# Patient Record
Sex: MALE | Race: WHITE | NOT HISPANIC OR LATINO | Employment: OTHER | URBAN - METROPOLITAN AREA
[De-identification: names, ages, dates, MRNs, and addresses within clinical notes are randomized per-mention and may not be internally consistent; named-entity substitution may affect disease eponyms.]

---

## 2017-04-20 ENCOUNTER — APPOINTMENT (OUTPATIENT)
Dept: PREADMISSION TESTING | Facility: HOSPITAL | Age: 67
End: 2017-04-20

## 2017-11-29 ENCOUNTER — OFFICE VISIT (OUTPATIENT)
Dept: ORTHOPEDIC SURGERY | Facility: CLINIC | Age: 67
End: 2017-11-29

## 2017-11-29 VITALS
WEIGHT: 203 LBS | DIASTOLIC BLOOD PRESSURE: 76 MMHG | SYSTOLIC BLOOD PRESSURE: 117 MMHG | HEART RATE: 56 BPM | BODY MASS INDEX: 28.42 KG/M2 | HEIGHT: 71 IN

## 2017-11-29 DIAGNOSIS — M17.11 PRIMARY OSTEOARTHRITIS OF RIGHT KNEE: Primary | ICD-10-CM

## 2017-11-29 PROCEDURE — 99203 OFFICE O/P NEW LOW 30 MIN: CPT | Performed by: ORTHOPAEDIC SURGERY

## 2017-11-29 RX ORDER — PREGABALIN 150 MG/1
150 CAPSULE ORAL ONCE
Status: CANCELLED | OUTPATIENT
Start: 2017-11-29 | End: 2017-11-29

## 2017-11-29 RX ORDER — CHLORHEXIDINE GLUCONATE 4 G/100ML
SOLUTION TOPICAL DAILY
Qty: 236 ML | Refills: 0 | Status: SHIPPED | OUTPATIENT
Start: 2017-11-29 | End: 2017-12-13 | Stop reason: HOSPADM

## 2017-11-29 RX ORDER — MELOXICAM 7.5 MG/1
15 TABLET ORAL ONCE
Status: CANCELLED | OUTPATIENT
Start: 2017-11-29 | End: 2017-11-29

## 2017-11-29 RX ORDER — ACETAMINOPHEN 325 MG/1
1000 TABLET ORAL ONCE
Status: CANCELLED | OUTPATIENT
Start: 2017-11-29 | End: 2017-11-29

## 2017-11-29 NOTE — PROGRESS NOTES
[facility] [place] [facility] [place]    Subjective     Chief Complaint   Patient presents with   • Follow-up     9 month - Right knee osteoarthritis        HPI    Yang Haji is a 66 y.o. male. He presents today for evaluation of right knee pain.  The pain is been present for several years, worsening over the past several months, moderate to severe, aching in quality, as well as sharp and stabbing, associated with swelling, popping, grinding, stiffness and giving way.  It worsens with walking and climbing stairs.  He has been considering knee replacement surgery, and presented today with that in mind.  He was previously followed by Dr. Johnathan Archer.  Previous history of knee surgery in the 1970s, with a postoperative infection.  That seemed to clear with treatment postoperatively, and he returned to fairly normal activities after that.  That surgery appears to be a then a ligamentous reconstruction of the knee, and the hardware was removed in conjunction with possible infection.    The patient has been considering right knee total joint replacement surgery.  The pain has been severe, causing instability, and has been worsening in spite of medications, physical therapy, and joint injections (steroid). The pain interferes with walking, sleeping, work and leisure activities.  No previous history of blood clots, nor family history of clotting disorders.      There is no problem list on file for this patient.    Past Medical History:   Diagnosis Date   • Adult BMI 28.0-28.9 kg/sq m    • Chronic pain of right knee    • Primary osteoarthritis of right knee       Past Surgical History:   Procedure Laterality Date   • KNEE SURGERY      1974      Family History   Problem Relation Age of Onset   • Diabetes Father    • Heart disease Father    • Hypertension Father    • Osteoarthritis Father    • Stroke Father    • Heart attack Father    • Cancer Mother      Social History     Social History   • Marital status:       Spouse name: N/A   • Number of children: N/A   • Years of education: N/A     Occupational History   • Not on file.     Social History Main Topics   • Smoking status: Never Smoker   • Smokeless tobacco: Current User     Types: Chew      Comment: Less than 1/2 can of smoeless tobacco per week   • Alcohol use No   • Drug use: No   • Sexual activity: Defer     Other Topics Concern   • Not on file     Social History Narrative      Current Outpatient Prescriptions on File Prior to Visit   Medication Sig Dispense Refill   • aspirin 81 MG EC tablet Take  by mouth Take As Directed.     • azithromycin (ZITHROMAX) 250 MG tablet Take  by mouth Take As Directed. .     • gabapentin (NEURONTIN) 100 MG capsule Take  by mouth Take As Directed.     • tiZANidine (ZANAFLEX) 2 MG tablet Take  by mouth Take As Directed.       No current facility-administered medications on file prior to visit.       No Known Allergies     Review of Systems   Constitutional: Negative for activity change, appetite change, chills, diaphoresis, fatigue, fever and unexpected weight change.   HENT: Negative for congestion, dental problem, drooling, ear discharge, ear pain, facial swelling, hearing loss, mouth sores, nosebleeds, postnasal drip, rhinorrhea, sinus pressure, sneezing, sore throat, tinnitus, trouble swallowing and voice change.    Eyes: Negative for photophobia, pain, discharge, redness, itching and visual disturbance.   Respiratory: Negative for apnea, cough, choking, chest tightness, shortness of breath, wheezing and stridor.    Cardiovascular: Negative for chest pain, palpitations and leg swelling.   Gastrointestinal: Negative for abdominal distention, abdominal pain, anal bleeding, blood in stool, constipation, diarrhea, nausea, rectal pain and vomiting.   Endocrine: Negative for cold intolerance, heat intolerance, polydipsia, polyphagia and polyuria.   Genitourinary: Negative for decreased urine volume, difficulty urinating, dysuria, enuresis,  "flank pain, frequency, genital sores, hematuria and urgency.   Musculoskeletal: Positive for arthralgias, back pain, gait problem, joint swelling, myalgias, neck pain and neck stiffness.   Skin: Negative for color change, pallor, rash and wound.   Allergic/Immunologic: Negative for environmental allergies, food allergies and immunocompromised state.   Neurological: Negative for dizziness, tremors, seizures, syncope, facial asymmetry, speech difficulty, weakness, light-headedness, numbness and headaches.   Hematological: Negative for adenopathy. Does not bruise/bleed easily.   Psychiatric/Behavioral: Negative for agitation, behavioral problems, confusion, decreased concentration, dysphoric mood, hallucinations, self-injury, sleep disturbance and suicidal ideas. The patient is not nervous/anxious and is not hyperactive.         Objective      Physical Exam  /76  Pulse 56  Ht 71\" (180.3 cm)  Wt 203 lb (92.1 kg)  BMI 28.31 kg/m2    Body mass index is 28.31 kg/(m^2).    General:   Mental Status:  Alert   Appearance: Cooperative, in no acute distress   Build and Nutrition: Well-nourished and well developed male   Orientation: Alert and oriented to person, place and time   Posture: Normal   Gait: Mildly antalgic gait on the right    Integument:   Right knee: Healed medial wound recurs anteromedially as it progresses distally with no signs of infection    Neurologic:   Sensation:    Right foot: Intact to light touch on the dorsal and plantar aspect   Motor:  Right lower extremity: 5/5 quadriceps, hamstrings, ankle dorsiflexors, and ankle plantar flexors    Vascular:   Right lower extremity:  prompt capillary refill    Lower Extremities:   Right Knee:    Tenderness:  Medial joint line tenderness    Effusion:  None    Swelling:  None    Crepitus:  Positive    Atrophy:  None    Range of motion:  Extension: 5°       Flexion: 110°  Instability:  No varus laxity, no valgus laxity, negative anterior drawer  Deformities: "  Varus      Imaging/Studies      Imaging Results (last 24 hours)     Procedure Component Value Units Date/Time    XR Knee 4+ View Right [[id number]] Resulted:  11/29/17 1611     Updated:  11/29/17 1612    Narrative:       RIght Knee Radiographs  Indication: right knee pain  Views: Standing AP's and skiers of both knees, with lateral and sunrise   views of the right knee    Comparison: no prior studies available    Findings:   Advanced arthritic changes are seen, with bone-on-bone contact in the   medial compartment, with tricompartmental osteophytes, and varus   alignment.          Assessment and Plan     Yang was seen today for follow-up.    Diagnoses and all orders for this visit:    Primary osteoarthritis of right knee  -     XR Knee 4+ View Right  -     CBC & Differential; Future  -     C-reactive Protein; Future  -     Sedimentation Rate; Future  -     Case Request; Standing  -     CBC and Differential; Future  -     Comprehensive metabolic panel; Future  -     Protime-INR; Future  -     APTT; Future  -     Hemoglobin A1c; Future  -     Sedimentation rate; Future  -     C-reactive protein; Future  -     Type and screen; Future  -     Urinalysis With / Culture If Indicated - Urine, Clean Catch; Future  -     ECG 12 Lead; Future  -     ceFAZolin (ANCEF) 2 g in sodium chloride 0.9 % 100 mL IVPB; Infuse 2 g into a venous catheter 1 (One) Time.  -     acetaminophen (TYLENOL) tablet 975 mg; Take 3 tablets by mouth 1 (One) Time.  -     meloxicam (MOBIC) tablet 15 mg; Take 2 tablets by mouth 1 (One) Time.  -     pregabalin (LYRICA) capsule 150 mg; Take 1 capsule by mouth 1 (One) Time.  -     mupirocin (BACTROBAN) 2 % nasal ointment 1 application; 1 application by Each Nare route 1 (One) Time.  -     Tranexamic Acid 1,000 mg in sodium chloride 0.9 % 100 mL; Infuse 1,000 mg into a venous catheter 1 (One) Time.  -     Tranexamic Acid 1,000 mg in sodium chloride 0.9 % 100 mL; Infuse 1,000 mg into a venous catheter 1 (One)  Time.  -     Case Request    Other orders  -     Inpatient Admission; Standing  -     Follow Anesthesia Guidelines / Standing Orders; Future  -     Orthopedic Discharge Planning for PT & Case Management - Inpatient With Post-Op Day 2 or 3 Discharge  -     Obtain informed consent  -     Provide instructions to patient regarding NPO status  -     Clorhexidine skin prep  -     Follow Anesthesia Guidelines / Standing Orders; Standing  -     Nerve Block; Standing  -     Verify NPO Status; Standing  -     LASHANDA hose- To be placed on patient in pre-op; Standing  -     SCD (sequential compression device)- to be placed on patient in Pre-op; Standing  -     POC Glucose Fingerstick; Standing  -     Clip operative site; Standing  -     Obtain informed consent (if not collected inpatient or PAT); Standing  -     Notify Physician - Standard; Standing  -     mupirocin (BACTROBAN NASAL) 2 % nasal ointment; into each nostril 2 (Two) Times a Day. Apply pea-sized amount tot each nostril twice daily for 5 days prior to surgery  -     chlorhexidine (HIBICLENS) 4 % external liquid; Apply  topically Daily. Shower with hibiclens solution as directed for 5 days prior to surgery        I reviewed my findings with patient today.  He has advanced right knee arthritis, and has been considering knee replacement surgery.  He has reached the point where he would like to proceed in the near future.  Please see my counseling note for details.  He has exhausted conservative treatment options.  Because of the previous infection years ago, I have recommended screening blood work preoperatively.  Should something show up abnormal, we may consider further imaging.  He has not had any pain in this knee for several years.  The possibility of quiecsent infection has been discussed, but I think that's low likelihood.  Most likely, we will use antibiotic impregnated cement, and certainly if there is anything unusual intraoperatively, that may alter the surgical  course.  He is at an increased risk of infection postoperatively, and this was openly discussed and he understands.    Return for For surgery as planned.     Surgical Counseling     I have informed the patient of the diagnosis and the prognosis.  Exhaustive conservative treatment modalities have not resulted in long term pain relief.  The symptoms have progressed to the point of daily pain and inability to perform activities of daily living without significant pain. The patient has reached the point of desiring to proceed with total knee arthroplasty after discussing the risks, benefits and alternatives to the procedure.  The surgical procedure itself was discussed in detail. Risks of the procedure were discussed, which included but are not limited to, bleeding, infection, damage to blood vessels and nerves, incomplete pain relief, loosening of the prosthesis, deep infection, need for further surgery, loss of limb, deep venous thrombosis, pulmonary embolus, death, heart attack, stroke, kidney failure, liver failure, and anesthetic complications.  In addition, the potential for deep infection developing in the future was discussed, which could require further surgery.  The knee would have to be re-opened, debrided, and potentially remove the prosthesis, which may or may not be replaced in the future.  Also, the possibility for loosening of the prosthesis has been mentioned.  If the prosthesis loosened, a revision arthroplasty could be performed, with results that are not as predictable compared to the original procedure.  The typical rehabilitative course has also been discussed, and full recovery may take up to a year to see the maximum benefit.  The importance of patient cooperation in the rehabilitative efforts has also been discussed.  No guarantees whatsoever were given.  The patient understands the potential risks versus the benefits and desires to proceed with total knee arthroplasty at a mutually convenient  time.      Medical Decision Making  Management Options : major surgery with risk factors  Data/Risk: radiology tests and independent visualization of imaging, lab tests, or EMG/NCV      Nazario Leal MD  11/29/17  6:03 PM

## 2017-11-30 PROBLEM — M17.11 PRIMARY OSTEOARTHRITIS OF RIGHT KNEE: Status: ACTIVE | Noted: 2017-11-30

## 2017-12-05 ENCOUNTER — APPOINTMENT (OUTPATIENT)
Dept: PREADMISSION TESTING | Facility: HOSPITAL | Age: 67
End: 2017-12-05

## 2017-12-05 VITALS — HEIGHT: 73 IN | BODY MASS INDEX: 27.17 KG/M2 | WEIGHT: 205.03 LBS

## 2017-12-05 DIAGNOSIS — M17.11 PRIMARY OSTEOARTHRITIS OF RIGHT KNEE: ICD-10-CM

## 2017-12-05 DIAGNOSIS — Z01.89 LABORATORY TEST: Primary | ICD-10-CM

## 2017-12-05 LAB
ABO GROUP BLD: NORMAL
ALBUMIN SERPL-MCNC: 4.3 G/DL (ref 3.2–4.8)
ALBUMIN/GLOB SERPL: 1.5 G/DL (ref 1.5–2.5)
ALP SERPL-CCNC: 78 U/L (ref 25–100)
ALT SERPL W P-5'-P-CCNC: 23 U/L (ref 7–40)
ANION GAP SERPL CALCULATED.3IONS-SCNC: 4 MMOL/L (ref 3–11)
APTT PPP: 26.6 SECONDS (ref 24–31)
AST SERPL-CCNC: 33 U/L (ref 0–33)
BASOPHILS # BLD AUTO: 0.02 10*3/MM3 (ref 0–0.2)
BASOPHILS NFR BLD AUTO: 0.4 % (ref 0–1)
BILIRUB SERPL-MCNC: 0.7 MG/DL (ref 0.3–1.2)
BILIRUB UR QL STRIP: NEGATIVE
BUN BLD-MCNC: 15 MG/DL (ref 9–23)
BUN/CREAT SERPL: 13.6 (ref 7–25)
CALCIUM SPEC-SCNC: 9.5 MG/DL (ref 8.7–10.4)
CHLORIDE SERPL-SCNC: 106 MMOL/L (ref 99–109)
CLARITY UR: CLEAR
CO2 SERPL-SCNC: 31 MMOL/L (ref 20–31)
COLOR UR: YELLOW
CREAT BLD-MCNC: 1.1 MG/DL (ref 0.6–1.3)
CRP SERPL-MCNC: 0.06 MG/DL (ref 0–1)
DEPRECATED RDW RBC AUTO: 43.7 FL (ref 37–54)
EOSINOPHIL # BLD AUTO: 0.22 10*3/MM3 (ref 0–0.3)
EOSINOPHIL NFR BLD AUTO: 4.9 % (ref 0–3)
ERYTHROCYTE [DISTWIDTH] IN BLOOD BY AUTOMATED COUNT: 12.8 % (ref 11.3–14.5)
ERYTHROCYTE [SEDIMENTATION RATE] IN BLOOD: 13 MM/HR (ref 0–20)
GFR SERPL CREATININE-BSD FRML MDRD: 67 ML/MIN/1.73
GLOBULIN UR ELPH-MCNC: 2.8 GM/DL
GLUCOSE BLD-MCNC: 98 MG/DL (ref 70–100)
GLUCOSE UR STRIP-MCNC: NEGATIVE MG/DL
HBA1C MFR BLD: 6 % (ref 4.8–5.6)
HCT VFR BLD AUTO: 43.2 % (ref 38.9–50.9)
HGB BLD-MCNC: 14.2 G/DL (ref 13.1–17.5)
HGB UR QL STRIP.AUTO: NEGATIVE
IMM GRANULOCYTES # BLD: 0 10*3/MM3 (ref 0–0.03)
IMM GRANULOCYTES NFR BLD: 0 % (ref 0–0.6)
INR PPP: 1.01
KETONES UR QL STRIP: NEGATIVE
LEUKOCYTE ESTERASE UR QL STRIP.AUTO: NEGATIVE
LYMPHOCYTES # BLD AUTO: 1.26 10*3/MM3 (ref 0.6–4.8)
LYMPHOCYTES NFR BLD AUTO: 28 % (ref 24–44)
MCH RBC QN AUTO: 30.8 PG (ref 27–31)
MCHC RBC AUTO-ENTMCNC: 32.9 G/DL (ref 32–36)
MCV RBC AUTO: 93.7 FL (ref 80–99)
MONOCYTES # BLD AUTO: 0.34 10*3/MM3 (ref 0–1)
MONOCYTES NFR BLD AUTO: 7.6 % (ref 0–12)
NEUTROPHILS # BLD AUTO: 2.66 10*3/MM3 (ref 1.5–8.3)
NEUTROPHILS NFR BLD AUTO: 59.1 % (ref 41–71)
NITRITE UR QL STRIP: NEGATIVE
PH UR STRIP.AUTO: 5.5 [PH] (ref 5–8)
PLATELET # BLD AUTO: 282 10*3/MM3 (ref 150–450)
PMV BLD AUTO: 10 FL (ref 6–12)
POTASSIUM BLD-SCNC: 4.6 MMOL/L (ref 3.5–5.5)
PROT SERPL-MCNC: 7.1 G/DL (ref 5.7–8.2)
PROT UR QL STRIP: NEGATIVE
PROTHROMBIN TIME: 11 SECONDS (ref 9.6–11.5)
RBC # BLD AUTO: 4.61 10*6/MM3 (ref 4.2–5.76)
RH BLD: POSITIVE
SODIUM BLD-SCNC: 141 MMOL/L (ref 132–146)
SP GR UR STRIP: 1.02 (ref 1–1.03)
UROBILINOGEN UR QL STRIP: NORMAL
WBC NRBC COR # BLD: 4.5 10*3/MM3 (ref 3.5–10.8)

## 2017-12-05 PROCEDURE — 85730 THROMBOPLASTIN TIME PARTIAL: CPT | Performed by: ORTHOPAEDIC SURGERY

## 2017-12-05 PROCEDURE — 85652 RBC SED RATE AUTOMATED: CPT | Performed by: ORTHOPAEDIC SURGERY

## 2017-12-05 PROCEDURE — 86140 C-REACTIVE PROTEIN: CPT | Performed by: ORTHOPAEDIC SURGERY

## 2017-12-05 PROCEDURE — 83036 HEMOGLOBIN GLYCOSYLATED A1C: CPT | Performed by: ORTHOPAEDIC SURGERY

## 2017-12-05 PROCEDURE — 81003 URINALYSIS AUTO W/O SCOPE: CPT | Performed by: ORTHOPAEDIC SURGERY

## 2017-12-05 PROCEDURE — 80053 COMPREHEN METABOLIC PANEL: CPT | Performed by: ORTHOPAEDIC SURGERY

## 2017-12-05 PROCEDURE — 93005 ELECTROCARDIOGRAM TRACING: CPT

## 2017-12-05 PROCEDURE — 86900 BLOOD TYPING SEROLOGIC ABO: CPT

## 2017-12-05 PROCEDURE — 93010 ELECTROCARDIOGRAM REPORT: CPT | Performed by: INTERNAL MEDICINE

## 2017-12-05 PROCEDURE — 85610 PROTHROMBIN TIME: CPT | Performed by: ORTHOPAEDIC SURGERY

## 2017-12-05 PROCEDURE — 36415 COLL VENOUS BLD VENIPUNCTURE: CPT

## 2017-12-05 PROCEDURE — 86901 BLOOD TYPING SEROLOGIC RH(D): CPT

## 2017-12-05 PROCEDURE — 85025 COMPLETE CBC W/AUTO DIFF WBC: CPT | Performed by: ORTHOPAEDIC SURGERY

## 2017-12-05 RX ORDER — NAPROXEN SODIUM 220 MG
440 TABLET ORAL 2 TIMES DAILY PRN
Status: ON HOLD | COMMUNITY
End: 2017-12-13

## 2017-12-05 RX ORDER — CHOLECALCIFEROL (VITAMIN D3) 125 MCG
10 CAPSULE ORAL NIGHTLY
COMMUNITY
End: 2018-07-31

## 2017-12-05 ASSESSMENT — KOOS JR
KOOS JR SCORE: 19
KOOS JR SCORE: 39.625

## 2017-12-05 NOTE — PAT
Patient to apply Chlorhexadine wipes  to surgical area (as instructed) the night before procedure and the AM of procedure. Wipes provided.    MEASURED FOR TEDS/SCDS IN PAT    CALF MEASUREMENT 14.5in     LENGTH MEASUREMENT 16.5in    Pt and wife attended joint class today

## 2017-12-06 NOTE — PAT
Notified Sharmaine/Elvis of possible MRSA history she will let dr. Leal know to see if he wants vanc.

## 2017-12-11 ENCOUNTER — TELEPHONE (OUTPATIENT)
Dept: ORTHOPEDIC SURGERY | Facility: CLINIC | Age: 67
End: 2017-12-11

## 2017-12-11 NOTE — TELEPHONE ENCOUNTER
----- Message from Nazario Leal MD sent at 12/11/2017  9:30 AM EST -----  He should try decongestant, and we more than likely will be able to proceed.  However, if he is concerned, we can reschedule.    ----- Message -----     From: Sharmaine Fallon     Sent: 12/11/2017   8:30 AM       To: Nazario Leal MD    Mr. Haji is schd for surgery in the morning. He has developed some clear drainage with a slight cough but no fever. He is going to try and contact his PCP. He is wanting to leave today since he is coming from [place] but not sure of what to do.    Please advise. Txs

## 2017-12-11 NOTE — TELEPHONE ENCOUNTER
Patient picked up meds from pharmacy and states he will follow the directions, but now has some congestion w/cough but no fever. Sent message to Dr Leal, waiting on his reponse.

## 2017-12-11 NOTE — TELEPHONE ENCOUNTER
----- Message from Nazario Leal MD sent at 12/8/2017 12:27 PM EST -----  I just looked in the computer and he has the decolonization rx's already.    ----- Message -----     From: Sharmaine Fallon     Sent: 12/8/2017   9:26 AM       To: Nazario Leal MD    Patient told PAT he had a staff infection after his last surgery.  ----- Message -----     From: Nazario Leal MD     Sent: 12/7/2017   6:04 PM       To: Sharmaine Fallon    Why do they think he should get Vancomycin pre-op?    ----- Message -----     From: Sharmaine Fallon     Sent: 12/7/2017   2:32 PM       To: Nazario Leal MD    CHART SHOWS NO ALLERGIES  ----- Message -----     From: Nazario Leal MD     Sent: 12/6/2017   2:25 PM       To: Sharmaine Fallon    Does he have an allergy?    ----- Message -----     From: Sharmaine Fallon     Sent: 12/6/2017   2:18 PM       To: Nazario Leal MD    LEELEE FROM [facility] CALLED TO ADVISE YOU DID NOT ORDER VANCOMYCIN FOR THIS PATIENT. DO YOU WANT IT ADDED?

## 2017-12-12 ENCOUNTER — APPOINTMENT (OUTPATIENT)
Dept: GENERAL RADIOLOGY | Facility: HOSPITAL | Age: 67
End: 2017-12-12

## 2017-12-12 ENCOUNTER — ANESTHESIA EVENT (OUTPATIENT)
Dept: PERIOP | Facility: HOSPITAL | Age: 67
End: 2017-12-12

## 2017-12-12 ENCOUNTER — HOSPITAL ENCOUNTER (INPATIENT)
Facility: HOSPITAL | Age: 67
LOS: 1 days | Discharge: HOME | End: 2017-12-13
Attending: ORTHOPAEDIC SURGERY | Admitting: ORTHOPAEDIC SURGERY

## 2017-12-12 ENCOUNTER — ANESTHESIA (OUTPATIENT)
Dept: PERIOP | Facility: HOSPITAL | Age: 67
End: 2017-12-12

## 2017-12-12 DIAGNOSIS — Z78.9 IMPAIRED MOBILITY AND ADLS: ICD-10-CM

## 2017-12-12 DIAGNOSIS — M17.11 PRIMARY OSTEOARTHRITIS OF RIGHT KNEE: ICD-10-CM

## 2017-12-12 DIAGNOSIS — Z74.09 IMPAIRED MOBILITY AND ADLS: ICD-10-CM

## 2017-12-12 DIAGNOSIS — Z96.651 S/P TOTAL KNEE ARTHROPLASTY, RIGHT: ICD-10-CM

## 2017-12-12 DIAGNOSIS — Z74.09 IMPAIRED FUNCTIONAL MOBILITY, BALANCE, GAIT, AND ENDURANCE: Primary | ICD-10-CM

## 2017-12-12 PROBLEM — Z72.0 TOBACCO ABUSE: Status: ACTIVE | Noted: 2017-12-12

## 2017-12-12 PROBLEM — R73.03 PREDIABETES: Status: ACTIVE | Noted: 2017-12-12

## 2017-12-12 LAB
ABO GROUP BLD: NORMAL
BLD GP AB SCN SERPL QL: NEGATIVE
GLUCOSE BLDC GLUCOMTR-MCNC: 130 MG/DL (ref 70–130)
GLUCOSE BLDC GLUCOMTR-MCNC: 198 MG/DL (ref 70–130)
GLUCOSE BLDC GLUCOMTR-MCNC: 84 MG/DL (ref 70–130)
RH BLD: POSITIVE

## 2017-12-12 PROCEDURE — C1713 ANCHOR/SCREW BN/BN,TIS/BN: HCPCS | Performed by: ORTHOPAEDIC SURGERY

## 2017-12-12 PROCEDURE — 86900 BLOOD TYPING SEROLOGIC ABO: CPT | Performed by: ORTHOPAEDIC SURGERY

## 2017-12-12 PROCEDURE — 25010000002 PROPOFOL 1000 MG/ML EMULSION: Performed by: NURSE ANESTHETIST, CERTIFIED REGISTERED

## 2017-12-12 PROCEDURE — 25010000003 CEFAZOLIN IN DEXTROSE 2-4 GM/100ML-% SOLUTION: Performed by: ORTHOPAEDIC SURGERY

## 2017-12-12 PROCEDURE — 63710000001 DIPHENHYDRAMINE PER 50 MG: Performed by: ORTHOPAEDIC SURGERY

## 2017-12-12 PROCEDURE — 86850 RBC ANTIBODY SCREEN: CPT | Performed by: ORTHOPAEDIC SURGERY

## 2017-12-12 PROCEDURE — 82962 GLUCOSE BLOOD TEST: CPT

## 2017-12-12 PROCEDURE — 27447 TOTAL KNEE ARTHROPLASTY: CPT | Performed by: ORTHOPAEDIC SURGERY

## 2017-12-12 PROCEDURE — C1755 CATHETER, INTRASPINAL: HCPCS | Performed by: ORTHOPAEDIC SURGERY

## 2017-12-12 PROCEDURE — 25010000002 DEXAMETHASONE PER 1 MG: Performed by: NURSE ANESTHETIST, CERTIFIED REGISTERED

## 2017-12-12 PROCEDURE — 97116 GAIT TRAINING THERAPY: CPT

## 2017-12-12 PROCEDURE — C2617 STENT, NON-COR, TEM W/O DEL: HCPCS | Performed by: ORTHOPAEDIC SURGERY

## 2017-12-12 PROCEDURE — 25010000002 ROPIVACAINE PER 1 MG: Performed by: ORTHOPAEDIC SURGERY

## 2017-12-12 PROCEDURE — 0SRC0J9 REPLACEMENT OF RIGHT KNEE JOINT WITH SYNTHETIC SUBSTITUTE, CEMENTED, OPEN APPROACH: ICD-10-PCS | Performed by: ORTHOPAEDIC SURGERY

## 2017-12-12 PROCEDURE — 86901 BLOOD TYPING SEROLOGIC RH(D): CPT | Performed by: ORTHOPAEDIC SURGERY

## 2017-12-12 PROCEDURE — 25010000002 HYDROMORPHONE PER 4 MG: Performed by: ORTHOPAEDIC SURGERY

## 2017-12-12 PROCEDURE — 25010000002 VANCOMYCIN PER 500 MG: Performed by: ORTHOPAEDIC SURGERY

## 2017-12-12 PROCEDURE — C1776 JOINT DEVICE (IMPLANTABLE): HCPCS | Performed by: ORTHOPAEDIC SURGERY

## 2017-12-12 PROCEDURE — 25010000002 ONDANSETRON PER 1 MG: Performed by: NURSE ANESTHETIST, CERTIFIED REGISTERED

## 2017-12-12 PROCEDURE — 97162 PT EVAL MOD COMPLEX 30 MIN: CPT

## 2017-12-12 PROCEDURE — 63710000001 INSULIN LISPRO (HUMAN) PER 5 UNITS: Performed by: NURSE PRACTITIONER

## 2017-12-12 PROCEDURE — 73560 X-RAY EXAM OF KNEE 1 OR 2: CPT

## 2017-12-12 PROCEDURE — 25010000002 ROPIVACAINE PER 1 MG: Performed by: NURSE ANESTHETIST, CERTIFIED REGISTERED

## 2017-12-12 PROCEDURE — 25010000003 MORPHINE PER 10 MG: Performed by: ORTHOPAEDIC SURGERY

## 2017-12-12 DEVICE — ATTUNE KNEE SYSTEM TIBIAL INSERT ROTATING PLATFORM POSTERIOR STABILIZED 8 6MM AOX
Type: IMPLANTABLE DEVICE | Site: KNEE | Status: FUNCTIONAL
Brand: ATTUNE

## 2017-12-12 DEVICE — CMT BONE SIMPLEX/P TMYCIN FDOS SGL: Type: IMPLANTABLE DEVICE | Site: KNEE | Status: FUNCTIONAL

## 2017-12-12 DEVICE — ATTUNE KNEE SYSTEM TIBIAL BASE ROTATING PLATFORM SIZE 8 CEMENTED
Type: IMPLANTABLE DEVICE | Site: KNEE | Status: FUNCTIONAL
Brand: ATTUNE

## 2017-12-12 DEVICE — ATTUNE KNEE SYSTEM FEMORAL POSTERIOR STABILIZED SIZE 8 RIGHT CEMENTED
Type: IMPLANTABLE DEVICE | Site: KNEE | Status: FUNCTIONAL
Brand: ATTUNE

## 2017-12-12 DEVICE — ATTUNE PATELLA MEDIALIZED ANATOMIC 41MM CEMENTED AOX
Type: IMPLANTABLE DEVICE | Site: KNEE | Status: FUNCTIONAL
Brand: ATTUNE

## 2017-12-12 DEVICE — TOTL KN ATTUNE DEPUY 9527038: Type: IMPLANTABLE DEVICE | Site: KNEE | Status: FUNCTIONAL

## 2017-12-12 RX ORDER — CEFAZOLIN SODIUM 2 G/100ML
2 INJECTION, SOLUTION INTRAVENOUS ONCE
Status: COMPLETED | OUTPATIENT
Start: 2017-12-12 | End: 2017-12-12

## 2017-12-12 RX ORDER — PROMETHAZINE HYDROCHLORIDE 25 MG/ML
6.25 INJECTION, SOLUTION INTRAMUSCULAR; INTRAVENOUS ONCE AS NEEDED
Status: DISCONTINUED | OUTPATIENT
Start: 2017-12-12 | End: 2017-12-12 | Stop reason: HOSPADM

## 2017-12-12 RX ORDER — MELOXICAM 7.5 MG/1
15 TABLET ORAL DAILY
Status: DISCONTINUED | OUTPATIENT
Start: 2017-12-12 | End: 2017-12-13 | Stop reason: HOSPADM

## 2017-12-12 RX ORDER — DEXAMETHASONE SODIUM PHOSPHATE 4 MG/ML
INJECTION, SOLUTION INTRA-ARTICULAR; INTRALESIONAL; INTRAMUSCULAR; INTRAVENOUS; SOFT TISSUE AS NEEDED
Status: DISCONTINUED | OUTPATIENT
Start: 2017-12-12 | End: 2017-12-12 | Stop reason: SURG

## 2017-12-12 RX ORDER — ACETAMINOPHEN 325 MG/1
650 TABLET ORAL EVERY 4 HOURS PRN
Status: DISCONTINUED | OUTPATIENT
Start: 2017-12-12 | End: 2017-12-13 | Stop reason: HOSPADM

## 2017-12-12 RX ORDER — SODIUM CHLORIDE 9 MG/ML
120 INJECTION, SOLUTION INTRAVENOUS CONTINUOUS
Status: DISCONTINUED | OUTPATIENT
Start: 2017-12-12 | End: 2017-12-13 | Stop reason: HOSPADM

## 2017-12-12 RX ORDER — ONDANSETRON 2 MG/ML
4 INJECTION INTRAMUSCULAR; INTRAVENOUS EVERY 6 HOURS PRN
Status: DISCONTINUED | OUTPATIENT
Start: 2017-12-12 | End: 2017-12-13 | Stop reason: HOSPADM

## 2017-12-12 RX ORDER — BUPIVACAINE HYDROCHLORIDE 2.5 MG/ML
INJECTION, SOLUTION EPIDURAL; INFILTRATION; INTRACAUDAL AS NEEDED
Status: DISCONTINUED | OUTPATIENT
Start: 2017-12-12 | End: 2017-12-12 | Stop reason: SURG

## 2017-12-12 RX ORDER — ASPIRIN 325 MG
325 TABLET, DELAYED RELEASE (ENTERIC COATED) ORAL DAILY
Status: DISCONTINUED | OUTPATIENT
Start: 2017-12-13 | End: 2017-12-13 | Stop reason: HOSPADM

## 2017-12-12 RX ORDER — PROMETHAZINE HYDROCHLORIDE 25 MG/1
25 TABLET ORAL ONCE AS NEEDED
Status: DISCONTINUED | OUTPATIENT
Start: 2017-12-12 | End: 2017-12-12 | Stop reason: HOSPADM

## 2017-12-12 RX ORDER — MORPHINE SULFATE 2 MG/ML
4 INJECTION, SOLUTION INTRAMUSCULAR; INTRAVENOUS
Status: DISCONTINUED | OUTPATIENT
Start: 2017-12-12 | End: 2017-12-13 | Stop reason: HOSPADM

## 2017-12-12 RX ORDER — FENTANYL CITRATE 50 UG/ML
50 INJECTION, SOLUTION INTRAMUSCULAR; INTRAVENOUS
Status: DISCONTINUED | OUTPATIENT
Start: 2017-12-12 | End: 2017-12-12 | Stop reason: HOSPADM

## 2017-12-12 RX ORDER — LIDOCAINE HYDROCHLORIDE 10 MG/ML
0.5 INJECTION, SOLUTION EPIDURAL; INFILTRATION; INTRACAUDAL; PERINEURAL ONCE AS NEEDED
Status: COMPLETED | OUTPATIENT
Start: 2017-12-12 | End: 2017-12-12

## 2017-12-12 RX ORDER — BISACODYL 10 MG
10 SUPPOSITORY, RECTAL RECTAL DAILY PRN
Status: DISCONTINUED | OUTPATIENT
Start: 2017-12-12 | End: 2017-12-13 | Stop reason: HOSPADM

## 2017-12-12 RX ORDER — BUPIVACAINE HYDROCHLORIDE 5 MG/ML
INJECTION, SOLUTION EPIDURAL; INTRACAUDAL AS NEEDED
Status: DISCONTINUED | OUTPATIENT
Start: 2017-12-12 | End: 2017-12-12 | Stop reason: SURG

## 2017-12-12 RX ORDER — PREGABALIN 75 MG/1
150 CAPSULE ORAL ONCE
Status: COMPLETED | OUTPATIENT
Start: 2017-12-12 | End: 2017-12-12

## 2017-12-12 RX ORDER — SENNA AND DOCUSATE SODIUM 50; 8.6 MG/1; MG/1
2 TABLET, FILM COATED ORAL 2 TIMES DAILY
Status: DISCONTINUED | OUTPATIENT
Start: 2017-12-12 | End: 2017-12-13 | Stop reason: HOSPADM

## 2017-12-12 RX ORDER — FAMOTIDINE 20 MG/1
20 TABLET, FILM COATED ORAL EVERY 12 HOURS SCHEDULED
Status: DISCONTINUED | OUTPATIENT
Start: 2017-12-12 | End: 2017-12-12 | Stop reason: HOSPADM

## 2017-12-12 RX ORDER — HYDRALAZINE HYDROCHLORIDE 20 MG/ML
10 INJECTION INTRAMUSCULAR; INTRAVENOUS EVERY 6 HOURS PRN
Status: DISCONTINUED | OUTPATIENT
Start: 2017-12-12 | End: 2017-12-13 | Stop reason: HOSPADM

## 2017-12-12 RX ORDER — HYDROMORPHONE HYDROCHLORIDE 1 MG/ML
0.5 INJECTION, SOLUTION INTRAMUSCULAR; INTRAVENOUS; SUBCUTANEOUS
Status: DISCONTINUED | OUTPATIENT
Start: 2017-12-12 | End: 2017-12-13 | Stop reason: HOSPADM

## 2017-12-12 RX ORDER — ACETAMINOPHEN 500 MG
1000 TABLET ORAL ONCE
Status: COMPLETED | OUTPATIENT
Start: 2017-12-12 | End: 2017-12-12

## 2017-12-12 RX ORDER — ONDANSETRON 2 MG/ML
INJECTION INTRAMUSCULAR; INTRAVENOUS AS NEEDED
Status: DISCONTINUED | OUTPATIENT
Start: 2017-12-12 | End: 2017-12-12 | Stop reason: SURG

## 2017-12-12 RX ORDER — MAGNESIUM HYDROXIDE 1200 MG/15ML
LIQUID ORAL AS NEEDED
Status: DISCONTINUED | OUTPATIENT
Start: 2017-12-12 | End: 2017-12-12 | Stop reason: HOSPADM

## 2017-12-12 RX ORDER — DOCUSATE SODIUM 100 MG/1
100 CAPSULE, LIQUID FILLED ORAL 2 TIMES DAILY PRN
Status: DISCONTINUED | OUTPATIENT
Start: 2017-12-12 | End: 2017-12-13 | Stop reason: HOSPADM

## 2017-12-12 RX ORDER — DEXTROSE MONOHYDRATE 25 G/50ML
25 INJECTION, SOLUTION INTRAVENOUS
Status: DISCONTINUED | OUTPATIENT
Start: 2017-12-12 | End: 2017-12-13 | Stop reason: HOSPADM

## 2017-12-12 RX ORDER — ONDANSETRON 2 MG/ML
4 INJECTION INTRAMUSCULAR; INTRAVENOUS EVERY 6 HOURS PRN
Status: DISCONTINUED | OUTPATIENT
Start: 2017-12-12 | End: 2017-12-12 | Stop reason: SDUPTHER

## 2017-12-12 RX ORDER — NICOTINE POLACRILEX 4 MG
15 LOZENGE BUCCAL
Status: DISCONTINUED | OUTPATIENT
Start: 2017-12-12 | End: 2017-12-13 | Stop reason: HOSPADM

## 2017-12-12 RX ORDER — FAMOTIDINE 10 MG/ML
20 INJECTION, SOLUTION INTRAVENOUS EVERY 12 HOURS SCHEDULED
Status: DISCONTINUED | OUTPATIENT
Start: 2017-12-12 | End: 2017-12-12 | Stop reason: HOSPADM

## 2017-12-12 RX ORDER — SODIUM CHLORIDE 0.9 % (FLUSH) 0.9 %
1-10 SYRINGE (ML) INJECTION AS NEEDED
Status: DISCONTINUED | OUTPATIENT
Start: 2017-12-12 | End: 2017-12-12 | Stop reason: HOSPADM

## 2017-12-12 RX ORDER — BISACODYL 5 MG/1
10 TABLET, DELAYED RELEASE ORAL DAILY PRN
Status: DISCONTINUED | OUTPATIENT
Start: 2017-12-12 | End: 2017-12-13 | Stop reason: HOSPADM

## 2017-12-12 RX ORDER — SODIUM CHLORIDE 0.9 % (FLUSH) 0.9 %
1-10 SYRINGE (ML) INJECTION AS NEEDED
Status: DISCONTINUED | OUTPATIENT
Start: 2017-12-12 | End: 2017-12-13 | Stop reason: HOSPADM

## 2017-12-12 RX ORDER — ROPIVACAINE HYDROCHLORIDE 2 MG/ML
14 INJECTION, SOLUTION EPIDURAL; INFILTRATION CONTINUOUS
Status: DISCONTINUED | OUTPATIENT
Start: 2017-12-12 | End: 2017-12-13

## 2017-12-12 RX ORDER — NALOXONE HCL 0.4 MG/ML
0.4 VIAL (ML) INJECTION
Status: DISCONTINUED | OUTPATIENT
Start: 2017-12-12 | End: 2017-12-13 | Stop reason: HOSPADM

## 2017-12-12 RX ORDER — NICOTINE 21 MG/24HR
1 PATCH, TRANSDERMAL 24 HOURS TRANSDERMAL
Status: DISCONTINUED | OUTPATIENT
Start: 2017-12-12 | End: 2017-12-13 | Stop reason: HOSPADM

## 2017-12-12 RX ORDER — ONDANSETRON 4 MG/1
4 TABLET, FILM COATED ORAL EVERY 6 HOURS PRN
Status: DISCONTINUED | OUTPATIENT
Start: 2017-12-12 | End: 2017-12-13 | Stop reason: HOSPADM

## 2017-12-12 RX ORDER — MELOXICAM 7.5 MG/1
15 TABLET ORAL ONCE
Status: COMPLETED | OUTPATIENT
Start: 2017-12-12 | End: 2017-12-12

## 2017-12-12 RX ORDER — PROMETHAZINE HYDROCHLORIDE 25 MG/1
25 SUPPOSITORY RECTAL ONCE AS NEEDED
Status: DISCONTINUED | OUTPATIENT
Start: 2017-12-12 | End: 2017-12-12 | Stop reason: HOSPADM

## 2017-12-12 RX ORDER — CEFAZOLIN SODIUM 2 G/100ML
2 INJECTION, SOLUTION INTRAVENOUS EVERY 8 HOURS
Status: COMPLETED | OUTPATIENT
Start: 2017-12-12 | End: 2017-12-13

## 2017-12-12 RX ORDER — DIPHENHYDRAMINE HCL 25 MG
25 CAPSULE ORAL EVERY 6 HOURS PRN
Status: DISCONTINUED | OUTPATIENT
Start: 2017-12-12 | End: 2017-12-13 | Stop reason: HOSPADM

## 2017-12-12 RX ORDER — SODIUM CHLORIDE, SODIUM LACTATE, POTASSIUM CHLORIDE, CALCIUM CHLORIDE 600; 310; 30; 20 MG/100ML; MG/100ML; MG/100ML; MG/100ML
9 INJECTION, SOLUTION INTRAVENOUS CONTINUOUS PRN
Status: DISCONTINUED | OUTPATIENT
Start: 2017-12-12 | End: 2017-12-12 | Stop reason: HOSPADM

## 2017-12-12 RX ORDER — NALOXONE HCL 0.4 MG/ML
0.1 VIAL (ML) INJECTION
Status: DISCONTINUED | OUTPATIENT
Start: 2017-12-12 | End: 2017-12-13 | Stop reason: HOSPADM

## 2017-12-12 RX ORDER — EPHEDRINE SULFATE 50 MG/ML
5 INJECTION, SOLUTION INTRAVENOUS ONCE AS NEEDED
Status: DISCONTINUED | OUTPATIENT
Start: 2017-12-12 | End: 2017-12-12 | Stop reason: HOSPADM

## 2017-12-12 RX ORDER — HYDROCODONE BITARTRATE AND ACETAMINOPHEN 7.5; 325 MG/1; MG/1
1 TABLET ORAL EVERY 4 HOURS PRN
Status: DISCONTINUED | OUTPATIENT
Start: 2017-12-12 | End: 2017-12-13

## 2017-12-12 RX ADMIN — TRANEXAMIC ACID 1000 MG: 100 INJECTION, SOLUTION INTRAVENOUS at 12:50

## 2017-12-12 RX ADMIN — MUPIROCIN 1 APPLICATION: 20 OINTMENT TOPICAL at 10:15

## 2017-12-12 RX ADMIN — DIPHENHYDRAMINE HYDROCHLORIDE 25 MG: 25 CAPSULE ORAL at 21:24

## 2017-12-12 RX ADMIN — PROPOFOL 45 MCG/KG/MIN: 10 INJECTION, EMULSION INTRAVENOUS at 12:50

## 2017-12-12 RX ADMIN — SODIUM CHLORIDE, POTASSIUM CHLORIDE, SODIUM LACTATE AND CALCIUM CHLORIDE 9 ML/HR: 600; 310; 30; 20 INJECTION, SOLUTION INTRAVENOUS at 09:59

## 2017-12-12 RX ADMIN — ROPIVACAINE HYDROCHLORIDE 12 ML/HR: 2 INJECTION, SOLUTION EPIDURAL; INFILTRATION at 15:15

## 2017-12-12 RX ADMIN — PREGABALIN 150 MG: 75 CAPSULE ORAL at 10:15

## 2017-12-12 RX ADMIN — HYDROCODONE BITARTRATE AND ACETAMINOPHEN 1 TABLET: 7.5; 325 TABLET ORAL at 21:11

## 2017-12-12 RX ADMIN — MELOXICAM 15 MG: 7.5 TABLET ORAL at 10:15

## 2017-12-12 RX ADMIN — INSULIN LISPRO 2 UNITS: 100 INJECTION, SOLUTION INTRAVENOUS; SUBCUTANEOUS at 21:11

## 2017-12-12 RX ADMIN — LIDOCAINE HYDROCHLORIDE 0.2 ML: 10 INJECTION, SOLUTION EPIDURAL; INFILTRATION; INTRACAUDAL; PERINEURAL at 09:59

## 2017-12-12 RX ADMIN — BUPIVACAINE HYDROCHLORIDE 2 ML: 5 INJECTION, SOLUTION EPIDURAL; INTRACAUDAL; PERINEURAL at 12:42

## 2017-12-12 RX ADMIN — TRANEXAMIC ACID 1000 MG: 100 INJECTION, SOLUTION INTRAVENOUS at 13:45

## 2017-12-12 RX ADMIN — CEFAZOLIN SODIUM 2 G: 2 INJECTION, SOLUTION INTRAVENOUS at 12:37

## 2017-12-12 RX ADMIN — CEFAZOLIN SODIUM 2 G: 2 INJECTION, SOLUTION INTRAVENOUS at 21:11

## 2017-12-12 RX ADMIN — DEXAMETHASONE SODIUM PHOSPHATE 8 MG: 4 INJECTION, SOLUTION INTRAMUSCULAR; INTRAVENOUS at 12:50

## 2017-12-12 RX ADMIN — FAMOTIDINE 20 MG: 20 TABLET, FILM COATED ORAL at 10:15

## 2017-12-12 RX ADMIN — HYDROMORPHONE HYDROCHLORIDE 0.5 MG: 1 INJECTION, SOLUTION INTRAMUSCULAR; INTRAVENOUS; SUBCUTANEOUS at 22:42

## 2017-12-12 RX ADMIN — MELOXICAM 15 MG: 7.5 TABLET ORAL at 17:32

## 2017-12-12 RX ADMIN — ACETAMINOPHEN 1000 MG: 500 TABLET ORAL at 10:15

## 2017-12-12 RX ADMIN — ONDANSETRON 4 MG: 2 INJECTION INTRAMUSCULAR; INTRAVENOUS at 14:12

## 2017-12-12 RX ADMIN — Medication 2 TABLET: at 17:32

## 2017-12-12 RX ADMIN — BUPIVACAINE HYDROCHLORIDE 20 ML: 2.5 INJECTION, SOLUTION EPIDURAL; INFILTRATION; INTRACAUDAL; PERINEURAL at 14:58

## 2017-12-12 NOTE — OP NOTE
DATE OF PROCEDURE: 12/12/17     SURGEON: Nazario Leal MD    ASSISTANT:  Francesca Chowdhury PA-C    PREOPERATIVE DIAGNOSIS: Right knee arthritis      POSTOPERATIVE DIAGNOSIS:  Right knee arthritis    PROCEDURES PERFORMED:   Right total knee arthroplasty with DePuy Attune components (# 8 posterior stabilized femur, # 8 rotating platform tibia, 6 mm rotating platform polyethylene, with 41 mm three peg anatomic patella).     SPECIMENS: None    IMPLANTS:   Implants     Implant    Cmt Bone Simplex/P Tmycin Fdos Sgl - [id number] - Implanted     Inventory item: Cmt Bone Simplex/P Tmycin Fdos Sgl Model/Cat number: [id number]    Serial number:  : ALLISON [id number]    Lot number: [id number] Size:     Device identifier:  Device identifier type:     As of 12/12/2017     Status: Implanted                  Cmt Bone Simplex/P Tmycin Fdos Sgl - [id number] - Implanted     Inventory item: Cmt Bone Simplex/P Tmycin Fdos Sgl Model/Cat number: [id number]    Serial number:  : ALLISON [id number]    Lot number: [id number] Size:     Device identifier:  Device identifier type:     As of 12/12/2017     Status: Implanted                  Base Tib Attune Cmt Rp Sz8 - [id number] - Implanted     Inventory item: Base Tib Attune Cmt Rp Sz8 Model/Cat number: [id number]    Serial number:  : [id number]    Lot number: [id number] Size:     Device identifier:  Device identifier type:     As of 12/12/2017     Status: Implanted                  Comp Fem Attune Cmt Ps Sz8 Rt - [id number] - Implanted     Inventory item: Comp Fem Attune Cmt Ps Sz8 Rt Model/Cat number: [id number]    Serial number:  : [id number]    Lot number: [id number] Size:     Device identifier:  Device identifier type:     As of 12/12/2017     Status: Implanted                  Comp Pat Attune Cmt Medl Gaye 41mm - [id number] - Implanted     Inventory item: Comp Pat Attune Cmt Medl Gaye 41mm Model/Cat number: [id number]    Serial number:  : [id number]    Lot number: [id number] Size:      Device identifier:  Device identifier type:     As of 12/12/2017     Status: Implanted                  Insrt Boyd Manuel Rp Ps Sz8 6mm - [id number] - Implanted     Inventory item: Insrt Boyd Manuel Rp Ps Sz8 6mm Model/Cat number: [id number]    Serial number:  : [id number]    Lot number: [id number] Size:     Device identifier:  Device identifier type:     As of 12/12/2017     Status: Implanted                         ANESTHESIA:  Spinal    STAFF:  Circulator: Cayetano Kim RN  Scrub Person: Jewel Billy  Vendor Representative: Tank Matute  Nursing Assistant: Fredy Benton  Assistant: Francesca Chowdhury PA-C    TOURNIQUET TIME: 73 minutes     ESTIMATED BLOOD LOSS: minimal     COMPLICATIONS: None    PREOPERATIVE ANTIBIOTICS: Ancef 2G    INDICATIONS: The patient is a 67 year old male with debilitating right knee pain secondary to osteoarthritis that failed to improve in spite of conservative treatment .  Options have been discussed at length with the patient and the patient has had an extended course of conservative treatment without long-term benefit. The patient has reached the point where the patient desires total knee arthroplasty surgery and understands the risks, benefits, and alternatives. Consent was obtained. Please see my office notes for details with regard to preoperative counseling and operative rationale.     DESCRIPTION OF PROCEDURE: The patient was positively identified in the preoperative holding area and brought to the operating suite and placed in a supine position. After adequate spinal anesthetic had been achieved, the right lower extremity was prepped and draped in the usual sterile fashion.  After application of a tourniquet to the right upper thigh, which was used during the procedure for a total 73 minutes. Landmarks of the knee were identified and timeout procedure was performed to confirm the operative site, as well as other parameters. Following the sterile prep and drape, a skin  incision was made just off the medial aspect of midline for a medial parapatellar approach. Following a sharp skin incision, dissection was carried down to the level of the extensor mechanism and a medial parapatellar arthrotomy was made and the patella was tucked into the lateral gutter. Description of arthritis:  Severe tricompartmental arthritis, with bone-on-bone contact in the medial and patellofemoral compartments, with tricompartmental osteophytes.  The knee was adequately exposed and a distal femoral cut was made with an intramedullary guide. This was subsequently sized for a # 8 implant and anterior, posterior chamfer cuts made. The menisci removed both medially and laterally.  The ACL was transected and the tibia was subluxed anteriorly. Proximal tibia cut was made with the external alignment guide. The cut was noted to be perpendicular to the tibial axis, with symmetric flexion and extension gaps. Therefore, final femoral preparations were made with the box cutting guide followed by final tibial preparations to accommodate a # 8 tibia. With a trial 6 insert in place, full flexion and extension was noted with no instability. The patella was then prepared for a 41 mm three peg anatomic patella which had excellent tracking. All trial components were removed and final components were cemented in place with namely a # 8 rotating platform tibia, # 8 posterior stabilized femur and a 41 mm three peg anatomic patella with a trial 6 insert for cement compression. All the excess cement was removed from the bone implant interface and allowed to harden. Tourniquet was deflated. Hemostasis was obtained with electrocautery. There was no brisk bleeding noted in the popliteal fossa in particular. Therefore, the knee was copiously irrigated as it was between major steps, and the final 6 mm insert was placed as this was deemed appropriate for the patient's anatomy with full flexion and extension and no instability and  attention was then directed towards closure. The medial parapatellar arthrotomy was closed with #1 Vicryl in an interrupted figure-of-eight fashion in 4 strategic locations followed by oversewing this from proximal to distal with a #1 StrataFix symmetric, which nicely sealed the joint, followed by closure of the deep fascial layer with #1 Vicryl in a buried interrupted fashion, followed by closure of the subcutaneous layer with 2-0 Vicryl and the skin with 3-0 Stratafix in a running subcuticular fashion. Prineo wound closure dressing was applied followed by a sterile dressing with 4 x 4's, abdominal pad, soft roll and Ace wrap. The patient tolerated the procedure well and was brought to the recovery room in good condition.     PLAN:  1.  The patient will begin early range of motion and weight-bearing per the post total knee arthroplasty protocol.   2.  I anticipate brief hospitalization for initial rehabilitation and pain control followed by continued rehabilitation in either home health or supervised setting.   3.  Postoperative medical management with Dr. Salazar.  4.  Aspirin will be utilized for DVT prophylaxis unless there is a contraindication.       Nazario Leal MD  12/12/17

## 2017-12-12 NOTE — ANESTHESIA POSTPROCEDURE EVALUATION
Patient: Yang Haji    Procedure Summary     Date Anesthesia Start Anesthesia Stop Room / Location    12/12/17 1237 1511 [facility] / [facility]       Procedure Diagnosis Surgeon Provider    RIGHT TOTAL KNEE ARTHROPLASTY (Right Knee) Primary osteoarthritis of right knee  (Primary osteoarthritis of right knee [M17.11]) MD Cruz West MD          Anesthesia Type: spinal, regional  Last vitals  BP   113/66 (12/12/17 1505)   Temp   98 °F (36.7 °C) (12/12/17 1457)   Pulse   53 (12/12/17 1505)   Resp   16 (12/12/17 1505)     SpO2   99 % (12/12/17 1505)     Post Anesthesia Care and Evaluation    Patient location during evaluation: PACU  Patient participation: complete - patient participated  Level of consciousness: awake and alert  Pain score: 0  Pain management: adequate  Airway patency: patent  Anesthetic complications: No anesthetic complications  PONV Status: none  Cardiovascular status: hemodynamically stable and acceptable  Respiratory status: nonlabored ventilation, acceptable and nasal cannula  Hydration status: acceptable

## 2017-12-12 NOTE — H&P
Patient Name: Yang Haji  MRN: [id number]  : 1950  DOS: 2017    Attending: Nazario Leal MD    Primary Care Provider: Jamarcus Harmon MD      Chief complaint:  Right knee osteoarthritis    Subjective   Patient is a 67 y.o. male presented for right total knee arthroplasty  by Dr. Leal under spinal anesthesia. He tolerated surgery well and is admitted for further medical management. His knee has been painful for many years. He denies use of assistive device for ambulation. Conservative treatments failed to provide pain relief.    When seen in PACU he is doing well. He denies pain, but is still under effects of spinal anesthesia. He denies nausea, shortness of breath or chest pain. No hx of DVT or PE.    (Above is  noted, agree.  Seen in his room afterwards.  Doing well.  Good pain control.  Spinal anesthesia effects have essentially subsided and he is ambulated down the hallway with physical therapy.  No nausea or vomiting or shortness breath.       Allergies:  No Known Allergies    Meds:  Prescriptions Prior to Admission   Medication Sig Dispense Refill Last Dose   • chlorhexidine (HIBICLENS) 4 % external liquid Apply  topically Daily. Shower with hibiclens solution as directed for 5 days prior to surgery 236 mL 0 2017 at 2200   • melatonin 5 MG tablet tablet Take 10 mg by mouth Every Night.   2017 at 2200   • mupirocin (BACTROBAN NASAL) 2 % nasal ointment into each nostril 2 (Two) Times a Day. Apply pea-sized amount tot each nostril twice daily for 5 days prior to surgery 22 g 0 2017 at 2200   • naproxen sodium (ALEVE) 220 MG tablet Take 440 mg by mouth 2 (Two) Times a Day As Needed for Mild Pain .   Past Month at Unknown time   • aspirin 81 MG EC tablet Take 81 mg by mouth Daily.   2017       History:   Past Medical History:   Diagnosis Date   • Adult BMI 28.0-28.9 kg/sq m    • Back pain    • Chronic pain of right knee    • PONV (postoperative nausea and vomiting)    •  Primary osteoarthritis of right knee    • Staph infection 1974    right knee, treated with antibiotics    • Wears glasses      Past Surgical History:   Procedure Laterality Date   • COLONOSCOPY  10/2017   • KNEE SURGERY Right 1974    debridement d/t staph infection   • LASIK Bilateral    • MEDIAL COLLATERAL LIGAMENT REPAIR, KNEE Right 1974   • RHIZOTOMY       Family History   Problem Relation Age of Onset   • Diabetes Father    • Heart disease Father    • Hypertension Father    • Osteoarthritis Father    • Stroke Father    • Heart attack Father    • Cancer Mother      Social History   Substance Use Topics   • Smoking status: Never Smoker   • Smokeless tobacco: Current User     Types: Chew      Comment: Less than 1/2 can of smokeless tobacco per week   • Alcohol use 1.2 oz/week     2 Glasses of wine per week      Comment: occasional   He is  and has 2 children. He is retired from education.    Review of Systems  Pertinent items are noted in HPI, all other systems reviewed and negative    Vital Signs  /67  Pulse 54  Temp 97.5 °F (36.4 °C) (Tympanic)   Resp 16  SpO2 98%    Physical Exam:    General Appearance:    Alert, cooperative, in no acute distress   Head:    Normocephalic, without obvious abnormality, atraumatic   Eyes:            Lids and lashes normal, conjunctivae and sclerae normal, no   icterus, no pallor, corneas clear, PERRLA   Ears:    Ears appear intact with no abnormalities noted   Neck:   No adenopathy, supple, trachea midline, no thyromegaly, no     carotid bruit, no JVD   Lungs:     Clear to auscultation,respirations regular, even and                   unlabored    Heart:    Regular rhythm and normal rate, normal S1 and S2, no            murmur, no gallop   Abdomen:     Normal bowel sounds, no masses, no organomegaly, soft        non-tender, non-distended, no guarding, no rebound                 tenderness   Genitalia:    Deferred   Extremities:   Right knee ACE wrap CDI. Nerve block  present   Pulses:   Pulses palpable and equal bilaterally   Skin:   No bleeding, bruising or rash   Neurologic:   Cranial nerves 2 - 12 grossly intact, lack of sensation or movement BLE due to effects of spinal block      I reviewed the patient's new clinical results.     Results for RINA FRAAG (MRN [id number]) as of 12/12/2017 15:24   Ref. Range 12/5/2017 13:01   Glucose Latest Ref Range: 70 - 100 mg/dL 98   Sodium Latest Ref Range: 132 - 146 mmol/L 141   Potassium Latest Ref Range: 3.5 - 5.5 mmol/L 4.6   CO2 Latest Ref Range: 20.0 - 31.0 mmol/L 31.0   Chloride Latest Ref Range: 99 - 109 mmol/L 106   Anion Gap Latest Ref Range: 3.0 - 11.0 mmol/L 4.0   Creatinine Latest Ref Range: 0.60 - 1.30 mg/dL 1.10   BUN Latest Ref Range: 9 - 23 mg/dL 15   BUN/Creatinine Ratio Latest Ref Range: 7.0 - 25.0  13.6   Calcium Latest Ref Range: 8.7 - 10.4 mg/dL 9.5   eGFR Non African Amer Latest Ref Range: >60 mL/min/1.73 67   Alkaline Phosphatase Latest Ref Range: 25 - 100 U/L 78   Total Protein Latest Ref Range: 5.7 - 8.2 g/dL 7.1   ALT (SGPT) Latest Ref Range: 7 - 40 U/L 23   AST (SGOT) Latest Ref Range: 0 - 33 U/L 33   Total Bilirubin Latest Ref Range: 0.3 - 1.2 mg/dL 0.7   Albumin Latest Ref Range: 3.20 - 4.80 g/dL 4.30   Globulin Latest Units: gm/dL 2.8   A/G Ratio Latest Ref Range: 1.5 - 2.5 g/dL 1.5   Hemoglobin A1C Latest Ref Range: 4.80 - 5.60 % 6.00 (H)   C-Reactive Protein Latest Ref Range: 0.00 - 1.00 mg/dL 0.06   Protime Latest Ref Range: 9.6 - 11.5 Seconds 11.0   INR Unknown 1.01   aPTT Latest Ref Range: 24.0 - 31.0 seconds 26.6   WBC Latest Ref Range: 3.50 - 10.80 10*3/mm3 4.50   RBC Latest Ref Range: 4.20 - 5.76 10*6/mm3 4.61   Hemoglobin Latest Ref Range: 13.1 - 17.5 g/dL 14.2   Hematocrit Latest Ref Range: 38.9 - 50.9 % 43.2   RDW Latest Ref Range: 11.3 - 14.5 % 12.8   MCV Latest Ref Range: 80.0 - 99.0 fL 93.7   MCH Latest Ref Range: 27.0 - 31.0 pg 30.8   MCHC Latest Ref Range: 32.0 - 36.0 g/dL 32.9   MPV  Latest Ref Range: 6.0 - 12.0 fL 10.0   Platelets Latest Ref Range: 150 - 450 10*3/mm3 282     Assessment and Plan:   Principal Problem:    S/P total knee arthroplasty, right  Active Problems:    Primary osteoarthritis of right knee    Tobacco abuse    Prediabetes      Plan  1. PT/OT- early ambulation post op  2. Pain control-prns, AC nerve block  3. IS-encourage  4. DVT proph- mechs/ASA  5. Bowel regimen  6. Resume home medications as appropriate  7. Monitor post-op labs  8. DC planning for home    Tobacco abuse  - Nicotine patch    PreDM  - hgb A1c on 12/5/17 6.0  - Accuchecks ACHS with low dose SSI  - DM educator consult    Seen and examined by me. Agree with above. Discussed with patient and wife.lesly Salazar MD  12/12/17  6:25 PM

## 2017-12-12 NOTE — INTERVAL H&P NOTE
H&P reviewed. The patient was examined and there are no changes to the H&P.     Heart: RRR with no MRG  Lungs: CTAB    Immunizations:    Tetanus: Unknown     Influenza: No     Pneumo: No    Labs were reviewed.     EKG: NSR    Plan: Right TKA      Agree with above - plan for right TKA

## 2017-12-12 NOTE — THERAPY EVALUATION
Acute Care - Physical Therapy Initial Evaluation  [facility]     Patient Name: Yang Haji  : 1950  MRN: [id number]  Today's Date: 2017   Onset of Illness/Injury or Date of Surgery Date: 17  Date of Referral to PT: 17  Referring Physician: MD Elvis      Admit Date: 2017     Visit Dx:    ICD-10-CM ICD-9-CM   1. Impaired functional mobility, balance, gait, and endurance Z74.09 V49.89   2. Primary osteoarthritis of right knee M17.11 715.16     Patient Active Problem List   Diagnosis   • Primary osteoarthritis of right knee   • S/P total knee arthroplasty, right   • Tobacco abuse   • Prediabetes     Past Medical History:   Diagnosis Date   • Adult BMI 28.0-28.9 kg/sq m    • Back pain    • Chronic pain of right knee    • PONV (postoperative nausea and vomiting)    • Primary osteoarthritis of right knee    • Staph infection     right knee, treated with antibiotics    • Wears glasses      Past Surgical History:   Procedure Laterality Date   • COLONOSCOPY  10/2017   • KNEE SURGERY Right 1974    debridement d/t staph infection   • LASIK Bilateral    • MEDIAL COLLATERAL LIGAMENT REPAIR, KNEE Right    • RHIZOTOMY            PT ASSESSMENT (last 72 hours)      PT Evaluation       17 1735 17 1004    Rehab Evaluation    Document Type evaluation  -LR     Subjective Information agree to therapy;complains of;numbness;pain   reports some numbness still remaining in LEs from spinal.   -LR     Patient Effort, Rehab Treatment good  -LR     Symptoms Noted During/After Treatment fatigue;increased pain  -LR     Symptoms Noted Comment Notified RN.   -LR     General Information    Patient Profile Review yes  -LR     Onset of Illness/Injury or Date of Surgery Date 17  -LR     Referring Physician MD Elvis  -LR     General Observations Patient supine in bed upon arrival. IV, SCDs, R knee ace bandaged, R adductor canal nerve catheter.   -LR     Pertinent History Of Current Problem  Patient presents for surgical management of persistent and progressive R knee pain and dysfunction that has failed to improve with conservative management.   -LR     Precautions/Limitations fall precautions;other (see comments)   R adductor canal nerve catheter, some numbness/knee buckling  -LR     Prior Level of Function min assist:;all household mobility;community mobility;gait;transfer;bed mobility;home management;cooking;cleaning;shopping;using stairs;independent:;driving   all mobility limited by pain.   -LR     Equipment Currently Used at Home cane, quad;cane, straight;commode;raised toilet   pt unsure if he has a shower chair versus tub bench  -LR none  -NM    Plans/Goals Discussed With patient;spouse/S.O.;agreed upon  -LR     Risks Reviewed patient:;spouse/S.O.:;LOB;nausea/vomiting;dizziness;increased discomfort;lines disloged  -LR     Benefits Reviewed patient:;spouse/S.O.:;improve function;increase independence;increase strength;increase balance;decrease pain;increase knowledge  -LR     Barriers to Rehab previous functional deficit  -LR     Living Environment    Lives With spouse  -LR spouse  -NM    Living Arrangements house  -LR house  -NM    Home Accessibility bed and bath on same level;house is not wheelchair accessible;stairs to enter home;stairs within home;tub/shower is not walk in;other (see comments)   tub shower and walk in shower  -LR     Number of Stairs to Enter Home 5  -LR     Number of Stairs Within Home --   does not have to access second floor.   -LR     Stair Railings at Home none  -LR     Type of Financial/Environmental Concern none  -LR     Transportation Available family or friend will provide  -LR car;family or friend will provide  -NM    Living Environment Comment Patient can also access home from garage that is at least 10 steps with handrail on R to get to main floor.   -LR     Clinical Impression    Date of Referral to PT 12/12/17  -LR     PT Diagnosis s/p elective R TKA  -LR      Prognosis good  -LR     Patient/Family Goals Statement go home, decrease pain  -LR     Criteria for Skilled Therapeutic Interventions Met yes;treatment indicated  -LR     Rehab Potential good, to achieve stated therapy goals  -LR     Pain Assessment    Pain Assessment 0-10  -LR     Pain Score 1  -LR     Post Pain Score 3  -LR     Pain Type Acute pain  -LR     Pain Location Knee  -LR     Pain Orientation Right;Anterior  -LR     Pain Intervention(s) Repositioned;Ambulation/increased activity;Cold applied  -LR     Vision Assessment/Intervention    Visual Impairment WFL  -LR     Cognitive Assessment/Intervention    Current Cognitive/Communication Assessment functional  -LR     Orientation Status oriented x 4;required verbal cueing (specifiy in comments)  -LR     Follows Commands/Answers Questions 100% of the time;able to follow single-step instructions;needs cueing;needs repetition  -LR     Personal Safety WNL/WFL  -LR     ROM (Range of Motion)    General ROM lower extremity range of motion deficits identified  -LR     General LE Assessment    ROM LLE ROM was WFL;knee, right: LE ROM deficit  -LR     ROM Detail R knee AROM impaired 25%  -LR     MMT (Manual Muscle Testing)    General MMT Assessment lower extremity strength deficits identified  -LR     Lower Extremity    Lower Ext Manual Muscle Testing left LE strength is WFL;right knee strength deficit  -LR     Lower Ext Manual Muscle Testing Detail R knee functionally 2/5 initially, improved with mobility; knee buckling initially, improved as gait progressed.   -LR     Mobility Assessment/Training    Extremity Weight-Bearing Status right lower extremity  -LR     Right Lower Extremity Weight-Bearing weight-bearing as tolerated  -LR     Bed Mobility, Assessment/Treatment    Bed Mobility, Assistive Device head of bed elevated;bed rails  -LR     Bed Mob, Supine to Sit, [place] verbal cues required;minimum assist (75% patient effort)  -LR     Bed Mob, Sit to Supine,  [place] not tested   [facility] at end of eval.   -LR     Bed Mobility, Safety Issues decreased use of legs for bridging/pushing  -LR     Bed Mobility, Impairments ROM decreased;strength decreased;pain;sensation decreased  -LR     Bed Mobility, Comment Verbal cues to move LEs towards EOB and to push up from bed from behind to raise trunk into sitting and to scoot hips out to get feet on floor. Min assist to move R LE. Denied dizziness upon sitting up.   -LR     Transfer Assessment/Treatment    Transfers, Sit-Stand [place] verbal cues required;contact guard assist;2 person assist required  -LR     Transfers, Stand-Sit [place] verbal cues required;contact guard assist;2 person assist required  -LR     Transfers, Sit-Stand-Sit, Assist Device rolling walker  -LR     Transfer, Safety Issues sequencing ability decreased;step length decreased;weight-shifting ability decreased;knees buckling  -LR     Transfer, Impairments ROM decreased;strength decreased;sensation decreased;pain  -LR     Transfer, Comment Verbal cues to push up from bed to stand and to reach back for chair to lower into sitting. Verbal cues to step R LE out before t/f for comfort. Patient initially stood and did not bear weight on R LE and took a few hops on L LE towards chair. Cued patient to weight shift onto R LE to assess strength. Mild knee buckling but able to take steps, compensating with UE weight bearing.   -LR     Gait Assessment/Treatment    Gait, [place] Level verbal cues required;contact guard assist;2 person assist required  -LR     Gait, Assistive Device rolling walker  -LR     Gait, Distance (Feet) 160  -LR     Gait, Gait Pattern Analysis swing-to gait  -LR     Gait, Gait Deviations right:;antalgic;decreased heel strike;forward flexed posture;step length decreased;toe-to-floor clearance decreased;weight-shifting ability decreased;knee buckling  -LR     Gait, Safety Issues sequencing ability decreased;step length  decreased;weight-shifting ability decreased  -LR     Gait, Impairments ROM decreased;strength decreased;sensation decreased;pain  -LR     Gait, Comment Patient ambulated with step to gait pattern at slow pace. Initially with mild R knee buckling with weight bearing, cued to compensate with UE weight bearing. Improved as gait progressed and patient was able to tolerate more R LE weight bearing without knee buckling and therefore required less UE weight bearing. Cues for sequencing of steps and to not step past front of RW. Gait limited by pain and weakness.   -LR     Therapy Exercises    Exercise Protocols total knee  -LR     Total Knee Exercises right:;10 reps;ankle pumps/circles;quad set;glut set   cues for technique; poor quad set  -LR     Sensory Assessment/Intervention    Sensory Impairment --   c/o numbness R LE;able to actively DF bilaterally  -LR     Positioning and Restraints    Pre-Treatment Position in bed  -LR     Post Treatment Position chair  -LR     In Chair notified nsg;reclined;sitting;call light within reach;encouraged to call for assist;with family/caregiver;compression device;legs elevated;RLE elevated  -LR       User Key  (r) = Recorded By, (t) = Taken By, (c) = Cosigned By    Initials Name Provider Type    LR Xochitl Veronica, PT Physical Therapist    NM Maxine Zazueta, RN Registered Nurse          Physical Therapy Education     Title: PT OT SLP Therapies (Done)     Topic: Physical Therapy (Done)     Point: Mobility training (Done)    Learning Progress Summary    Learner Readiness Method Response Comment Documented by Status   Patient Acceptance EJULES NR Educated on weight bearing status, precautions, correct gait mechanics, and proper ice use. LR 12/12/17 1836 Done   Significant Other Acceptance EJULES NR Educated on weight bearing status, precautions, correct gait mechanics, and proper ice use. LR 12/12/17 1836 Done               Point: Home exercise program (Done)    Learning Progress  Summary    Learner Readiness Method Response Comment Documented by Status   Patient Acceptance E,D VU,NR Educated on weight bearing status, precautions, correct gait mechanics, and proper ice use. LR 12/12/17 1836 Done   Significant Other Acceptance E,D VU,NR Educated on weight bearing status, precautions, correct gait mechanics, and proper ice use. LR 12/12/17 1836 Done               Point: Body mechanics (Done)    Learning Progress Summary    Learner Readiness Method Response Comment Documented by Status   Patient Acceptance E,D VU,NR Educated on weight bearing status, precautions, correct gait mechanics, and proper ice use. LR 12/12/17 1836 Done   Significant Other Acceptance E,D VU,NR Educated on weight bearing status, precautions, correct gait mechanics, and proper ice use. LR 12/12/17 1836 Done               Point: Precautions (Done)    Learning Progress Summary    Learner Readiness Method Response Comment Documented by Status   Patient Acceptance E,D VU,NR Educated on weight bearing status, precautions, correct gait mechanics, and proper ice use. LR 12/12/17 1836 Done   Significant Other Acceptance E,D VU,NR Educated on weight bearing status, precautions, correct gait mechanics, and proper ice use. LR 12/12/17 1836 Done                      User Key     Initials Effective Dates Name Provider Type Discipline    LR 06/19/15 -  Xochitl Veronica, PT Physical Therapist PT                PT Recommendation and Plan  Anticipated Equipment Needs At Discharge: front wheeled walker  Anticipated Discharge Disposition: home with assist, home with home health  Planned Therapy Interventions: balance training, bed mobility training, gait training, home exercise program, patient/family education, ROM (Range of Motion), stair training, strengthening, transfer training  PT Frequency: 2 times/day  Plan of Care Review  Plan Of Care Reviewed With: patient, spouse  Progress: progress toward functional goals as  expected  Outcome Summary/Follow up Plan: Patient limited by numbness from spinal remaining in R LE, but able to ambulate 160 feet with RW with mild R knee buckling, which improved as gait progressed. Instructed nursing to use knee immobilizer for further mobility if numbness persists. ROM to be initiated POD#1. Plan is d/c home with HHPT. Will continue to progress as able.           IP PT Goals       12/12/17 1735          Bed Mobility PT LTG    Bed Mobility PT LTG, Date Established 12/12/17  -LR      Bed Mobility PT LTG, Time to Achieve 3 days  -LR      Bed Mobility PT LTG, Activity Type supine to sit/sit to supine  -LR      Bed Mobility PT LTG, [place] Level conditional independence  -LR      Bed Mobility PT LTG, Date Goal Reviewed 12/12/17  -LR      Bed Mobility PT LTG, Outcome goal ongoing  -LR      Transfer Training PT LTG    Transfer Training PT LTG, Date Established 12/12/17  -LR      Transfer Training PT LTG, Time to Achieve 3 days  -LR      Transfer Training PT LTG, Activity Type sit to stand/stand to sit  -LR      Transfer Training PT LTG, [place] Level conditional independence  -LR      Transfer Training PT LTG, Assist Device walker, rolling  -LR      Transfer Training PT  LTG, Date Goal Reviewed 12/12/17  -LR      Transfer Training PT LTG, Outcome goal ongoing  -LR      Gait Training PT LTG    Gait Training Goal PT LTG, Date Established 12/12/17  -LR      Gait Training Goal PT LTG, Time to Achieve 3 days  -LR      Gait Training Goal PT LTG, [place] Level conditional independence  -LR      Gait Training Goal PT LTG, Assist Device walker, rolling  -LR      Gait Training Goal PT LTG, Distance to Achieve 500 feet  -LR      Gait Training Goal PT LTG, Date Goal Reviewed 12/12/17  -LR      Gait Training Goal PT LTG, Outcome goal ongoing  -LR      Stair Training PT LTG    Stair Training Goal PT LTG, Date Established 12/12/17  -LR      Stair Training Goal PT LTG, Time to Achieve 3 days  -LR       Stair Training Goal PT LTG, Number of Steps 5  -LR      Stair Training Goal PT LTG, [place] Level contact guard assist  -LR      Stair Training Goal PT LTG, Assist Device cane, straight;other (see comments)   HHA  -LR      Stair Training Goal PT LTG, Date Goal Reviewed 12/12/17  -LR      Stair Training Goal PT LTG, Outcome goal ongoing  -LR      Range of Motion PT LTG    Range of Motion Goal PT LTG, Date Established 12/12/17  -LR      Range of Motion Goal PT LTG, Time to Achieve 3 days  -LR      Range fo Motion Goal PT LTG, Joint R knee  -LR      Range of Motion Goal PT LTG, AAROM Measure 0-90 degrees  -LR      Range of Motion Goal PT LTG, Date Goal Reviewed 12/12/17  -LR      Range of Motion Goal PT LTG, Outcome goal ongoing  -LR        User Key  (r) = Recorded By, (t) = Taken By, (c) = Cosigned By    Initials Name Provider Type    LR Xochitl Veronica, PT Physical Therapist                Outcome Measures       12/12/17 1735          How much help from another person do you currently need...    Turning from your back to your side while in flat bed without using bedrails? 3  -LR      Moving from lying on back to sitting on the side of a flat bed without bedrails? 3  -LR      Moving to and from a bed to a chair (including a wheelchair)? 3  -LR      Standing up from a chair using your arms (e.g., wheelchair, bedside chair)? 3  -LR      Climbing 3-5 steps with a railing? 1  -LR      To walk in hospital room? 3  -LR      AM-PAC 6 Clicks Score 16  -LR      Functional Assessment    Outcome Measure Options AM-PAC 6 Clicks Basic Mobility (PT)  -LR        User Key  (r) = Recorded By, (t) = Taken By, (c) = Cosigned By    Initials Name Provider Type    DARNELL Veronica, PT Physical Therapist           Time Calculation:         PT Charges       12/12/17 1836          Time Calculation    Start Time 1735  -LR      PT Received On 12/12/17  -LR      PT Goal Re-Cert Due Date 12/22/17  -LR      Time Calculation- PT     Total Timed Code Minutes- PT 15 minute(s)  -LR        User Key  (r) = Recorded By, (t) = Taken By, (c) = Cosigned By    Initials Name Provider Type    LR Xochitl Veronica, PT Physical Therapist          Therapy Charges for Today     Code Description Service Date Service Provider Modifiers Qty    08647102490 HC GAIT TRAINING EA 15 MIN 12/12/2017 Xochitl Veronica, PT GP 1    03115694378 HC PT THER SUPP EA 15 MIN 12/12/2017 Xochitl Veronica, PT GP 4    03335386448 HC PT EVAL MOD COMPLEXITY 3 12/12/2017 Xochitl Veronica, PT GP 1          PT G-Codes  Outcome Measure Options: AM-PAC 6 Clicks Basic Mobility (PT)      Xochitl Veronica, PT  12/12/2017

## 2017-12-12 NOTE — ANESTHESIA PREPROCEDURE EVALUATION
Anesthesia Evaluation     Patient summary reviewed and Nursing notes reviewed   history of anesthetic complications: PONV  NPO Solid Status: > 8 hours  NPO Liquid Status: > 2 hours     Airway   Mallampati: I  TM distance: >3 FB  Neck ROM: full  no difficulty expected  Dental - normal exam     Pulmonary     breath sounds clear to auscultation  Cardiovascular   Exercise tolerance: good (4-7 METS)    ECG reviewed  Rhythm: regular  Rate: abnormal        Neuro/Psych  GI/Hepatic/Renal/Endo      Musculoskeletal     (+) back pain,   Abdominal  - normal exam  (-) obese    Abdomen: soft.   Substance History      OB/GYN          Other   (+) arthritis                                     Anesthesia Plan    ASA 2     spinal and regional     intravenous induction   Anesthetic plan and risks discussed with patient.    Plan discussed with CRNA.

## 2017-12-12 NOTE — ANESTHESIA PROCEDURE NOTES
Spinal Block    Patient location during procedure: OR  Indication:at surgeon's request  Performed By  CRNA: KE SOLIS  Preanesthetic Checklist  Completed: patient identified, site marked, surgical consent, pre-op evaluation, timeout performed, IV checked, risks and benefits discussed and monitors and equipment checked  Spinal Block Prep:  Patient Position:sitting  Sterile Tech:cap, gloves, sterile barriers and mask  Prep:Chloraprep  Patient Monitoring:blood pressure monitoring, continuous pulse oximetry and EKG  Spinal Block Procedure  Approach:midline  Guidance:landmark technique and palpation technique  Location:L4-L5  Needle Type:Hossein  Needle Gauge:25 G  Placement of Spinal needle event:cerebrospinal fluid aspirated  Paresthesia: no  Fluid Appearance:clear  Post Assessment  Patient Tolerance:patient tolerated the procedure well with no apparent complications  Complications no  Additional Notes  Procedure:  Pt assisted to sitting position, with legs in position of comfort over side of bed.  Pt. instructed in optimal spine presentation, the spine was prepped/ Draped and the skin at insertion site was anesthetized with 1% Lidocaine 2 ml.  The spinal needle was then advanced until CSF flow was obtained and LA was injected:      Marcaine 0.5% PSF injected 10 Mg.

## 2017-12-12 NOTE — H&P (VIEW-ONLY)
[facility]    Subjective     Chief Complaint   Patient presents with   • Follow-up     9 month - Right knee osteoarthritis        HPI    Yang Haji is a 66 y.o. male. He presents today for evaluation of right knee pain.  The pain is been present for several years, worsening over the past several months, moderate to severe, aching in quality, as well as sharp and stabbing, associated with swelling, popping, grinding, stiffness and giving way.  It worsens with walking and climbing stairs.  He has been considering knee replacement surgery, and presented today with that in mind.  He was previously followed by Dr. Johnathan Archer.  Previous history of knee surgery in the 1970s, with a postoperative infection.  That seemed to clear with treatment postoperatively, and he returned to fairly normal activities after that.  That surgery appears to be a then a ligamentous reconstruction of the knee, and the hardware was removed in conjunction with possible infection.    The patient has been considering right knee total joint replacement surgery.  The pain has been severe, causing instability, and has been worsening in spite of medications, physical therapy, and joint injections (steroid). The pain interferes with walking, sleeping, work and leisure activities.  No previous history of blood clots, nor family history of clotting disorders.      There is no problem list on file for this patient.    Past Medical History:   Diagnosis Date   • Adult BMI 28.0-28.9 kg/sq m    • Chronic pain of right knee    • Primary osteoarthritis of right knee       Past Surgical History:   Procedure Laterality Date   • KNEE SURGERY      1974      Family History   Problem Relation Age of Onset   • Diabetes Father    • Heart disease Father    • Hypertension Father    • Osteoarthritis Father    • Stroke Father    • Heart attack Father    • Cancer Mother      Social History     Social History   • Marital status:       Spouse name: N/A   • Number of children: N/A   • Years of education: N/A     Occupational History   • Not on file.     Social History Main Topics   • Smoking status: Never Smoker   • Smokeless tobacco: Current User     Types: Chew      Comment: Less than 1/2 can of smoeless tobacco per week   • Alcohol use No   • Drug use: No   • Sexual activity: Defer     Other Topics Concern   • Not on file     Social History Narrative      Current Outpatient Prescriptions on File Prior to Visit   Medication Sig Dispense Refill   • aspirin 81 MG EC tablet Take  by mouth Take As Directed.     • azithromycin (ZITHROMAX) 250 MG tablet Take  by mouth Take As Directed. .     • gabapentin (NEURONTIN) 100 MG capsule Take  by mouth Take As Directed.     • tiZANidine (ZANAFLEX) 2 MG tablet Take  by mouth Take As Directed.       No current facility-administered medications on file prior to visit.       No Known Allergies     Review of Systems   Constitutional: Negative for activity change, appetite change, chills, diaphoresis, fatigue, fever and unexpected weight change.   HENT: Negative for congestion, dental problem, drooling, ear discharge, ear pain, facial swelling, hearing loss, mouth sores, nosebleeds, postnasal drip, rhinorrhea, sinus pressure, sneezing, sore throat, tinnitus, trouble swallowing and voice change.    Eyes: Negative for photophobia, pain, discharge, redness, itching and visual disturbance.   Respiratory: Negative for apnea, cough, choking, chest tightness, shortness of breath, wheezing and stridor.    Cardiovascular: Negative for chest pain, palpitations and leg swelling.   Gastrointestinal: Negative for abdominal distention, abdominal pain, anal bleeding, blood in stool, constipation, diarrhea, nausea, rectal pain and vomiting.   Endocrine: Negative for cold intolerance, heat intolerance, polydipsia, polyphagia and polyuria.   Genitourinary: Negative for decreased urine volume, difficulty urinating, dysuria, enuresis,  "flank pain, frequency, genital sores, hematuria and urgency.   Musculoskeletal: Positive for arthralgias, back pain, gait problem, joint swelling, myalgias, neck pain and neck stiffness.   Skin: Negative for color change, pallor, rash and wound.   Allergic/Immunologic: Negative for environmental allergies, food allergies and immunocompromised state.   Neurological: Negative for dizziness, tremors, seizures, syncope, facial asymmetry, speech difficulty, weakness, light-headedness, numbness and headaches.   Hematological: Negative for adenopathy. Does not bruise/bleed easily.   Psychiatric/Behavioral: Negative for agitation, behavioral problems, confusion, decreased concentration, dysphoric mood, hallucinations, self-injury, sleep disturbance and suicidal ideas. The patient is not nervous/anxious and is not hyperactive.         Objective      Physical Exam  /76  Pulse 56  Ht 71\" (180.3 cm)  Wt 203 lb (92.1 kg)  BMI 28.31 kg/m2    Body mass index is 28.31 kg/(m^2).    General:   Mental Status:  Alert   Appearance: Cooperative, in no acute distress   Build and Nutrition: Well-nourished and well developed male   Orientation: Alert and oriented to person, place and time   Posture: Normal   Gait: Mildly antalgic gait on the right    Integument:   Right knee: Healed medial wound recurs anteromedially as it progresses distally with no signs of infection    Neurologic:   Sensation:    Right foot: Intact to light touch on the dorsal and plantar aspect   Motor:  Right lower extremity: 5/5 quadriceps, hamstrings, ankle dorsiflexors, and ankle plantar flexors    Vascular:   Right lower extremity:  prompt capillary refill    Lower Extremities:   Right Knee:    Tenderness:  Medial joint line tenderness    Effusion:  None    Swelling:  None    Crepitus:  Positive    Atrophy:  None    Range of motion:  Extension: 5°       Flexion: 110°  Instability:  No varus laxity, no valgus laxity, negative anterior drawer  Deformities:  "  Varus      Imaging/Studies      Imaging Results (last 24 hours)     Procedure Component Value Units Date/Time    XR Knee 4+ View Right [[id number]] Resulted:  11/29/17 1611     Updated:  11/29/17 1612    Narrative:       RIght Knee Radiographs  Indication: right knee pain  Views: Standing AP's and skiers of both knees, with lateral and sunrise   views of the right knee    Comparison: no prior studies available    Findings:   Advanced arthritic changes are seen, with bone-on-bone contact in the   medial compartment, with tricompartmental osteophytes, and varus   alignment.          Assessment and Plan     Yang was seen today for follow-up.    Diagnoses and all orders for this visit:    Primary osteoarthritis of right knee  -     XR Knee 4+ View Right  -     CBC & Differential; Future  -     C-reactive Protein; Future  -     Sedimentation Rate; Future  -     Case Request; Standing  -     CBC and Differential; Future  -     Comprehensive metabolic panel; Future  -     Protime-INR; Future  -     APTT; Future  -     Hemoglobin A1c; Future  -     Sedimentation rate; Future  -     C-reactive protein; Future  -     Type and screen; Future  -     Urinalysis With / Culture If Indicated - Urine, Clean Catch; Future  -     ECG 12 Lead; Future  -     ceFAZolin (ANCEF) 2 g in sodium chloride 0.9 % 100 mL IVPB; Infuse 2 g into a venous catheter 1 (One) Time.  -     acetaminophen (TYLENOL) tablet 975 mg; Take 3 tablets by mouth 1 (One) Time.  -     meloxicam (MOBIC) tablet 15 mg; Take 2 tablets by mouth 1 (One) Time.  -     pregabalin (LYRICA) capsule 150 mg; Take 1 capsule by mouth 1 (One) Time.  -     mupirocin (BACTROBAN) 2 % nasal ointment 1 application; 1 application by Each Nare route 1 (One) Time.  -     Tranexamic Acid 1,000 mg in sodium chloride 0.9 % 100 mL; Infuse 1,000 mg into a venous catheter 1 (One) Time.  -     Tranexamic Acid 1,000 mg in sodium chloride 0.9 % 100 mL; Infuse 1,000 mg into a venous catheter 1 (One)  Time.  -     Case Request    Other orders  -     Inpatient Admission; Standing  -     Follow Anesthesia Guidelines / Standing Orders; Future  -     Orthopedic Discharge Planning for PT & Case Management - Inpatient With Post-Op Day 2 or 3 Discharge  -     Obtain informed consent  -     Provide instructions to patient regarding NPO status  -     Clorhexidine skin prep  -     Follow Anesthesia Guidelines / Standing Orders; Standing  -     Nerve Block; Standing  -     Verify NPO Status; Standing  -     LASHANDA hose- To be placed on patient in pre-op; Standing  -     SCD (sequential compression device)- to be placed on patient in Pre-op; Standing  -     POC Glucose Fingerstick; Standing  -     Clip operative site; Standing  -     Obtain informed consent (if not collected inpatient or PAT); Standing  -     Notify Physician - Standard; Standing  -     mupirocin (BACTROBAN NASAL) 2 % nasal ointment; into each nostril 2 (Two) Times a Day. Apply pea-sized amount tot each nostril twice daily for 5 days prior to surgery  -     chlorhexidine (HIBICLENS) 4 % external liquid; Apply  topically Daily. Shower with hibiclens solution as directed for 5 days prior to surgery        I reviewed my findings with patient today.  He has advanced right knee arthritis, and has been considering knee replacement surgery.  He has reached the point where he would like to proceed in the near future.  Please see my counseling note for details.  He has exhausted conservative treatment options.  Because of the previous infection years ago, I have recommended screening blood work preoperatively.  Should something show up abnormal, we may consider further imaging.  He has not had any pain in this knee for several years.  The possibility of quiecsent infection has been discussed, but I think that's low likelihood.  Most likely, we will use antibiotic impregnated cement, and certainly if there is anything unusual intraoperatively, that may alter the surgical  course.  He is at an increased risk of infection postoperatively, and this was openly discussed and he understands.    Return for For surgery as planned.     Surgical Counseling     I have informed the patient of the diagnosis and the prognosis.  Exhaustive conservative treatment modalities have not resulted in long term pain relief.  The symptoms have progressed to the point of daily pain and inability to perform activities of daily living without significant pain. The patient has reached the point of desiring to proceed with total knee arthroplasty after discussing the risks, benefits and alternatives to the procedure.  The surgical procedure itself was discussed in detail. Risks of the procedure were discussed, which included but are not limited to, bleeding, infection, damage to blood vessels and nerves, incomplete pain relief, loosening of the prosthesis, deep infection, need for further surgery, loss of limb, deep venous thrombosis, pulmonary embolus, death, heart attack, stroke, kidney failure, liver failure, and anesthetic complications.  In addition, the potential for deep infection developing in the future was discussed, which could require further surgery.  The knee would have to be re-opened, debrided, and potentially remove the prosthesis, which may or may not be replaced in the future.  Also, the possibility for loosening of the prosthesis has been mentioned.  If the prosthesis loosened, a revision arthroplasty could be performed, with results that are not as predictable compared to the original procedure.  The typical rehabilitative course has also been discussed, and full recovery may take up to a year to see the maximum benefit.  The importance of patient cooperation in the rehabilitative efforts has also been discussed.  No guarantees whatsoever were given.  The patient understands the potential risks versus the benefits and desires to proceed with total knee arthroplasty at a mutually convenient  time.      Medical Decision Making  Management Options : major surgery with risk factors  Data/Risk: radiology tests and independent visualization of imaging, lab tests, or EMG/NCV      Nazario Leal MD  11/29/17  6:03 PM

## 2017-12-12 NOTE — PLAN OF CARE
Problem: Patient Care Overview (Adult)  Goal: Plan of Care Review  Outcome: Ongoing (interventions implemented as appropriate)    12/12/17 1735   Coping/Psychosocial Response Interventions   Plan Of Care Reviewed With patient;spouse   Patient Care Overview   Progress progress toward functional goals as expected   Outcome Evaluation   Outcome Summary/Follow up Plan Patient limited by numbness from spinal remaining in R LE, but able to ambulate 160 feet with RW with mild R knee buckling, which improved as gait progressed. Instructed nursing to use knee immobilizer for further mobility if numbness persists. ROM to be initiated POD#1. Plan is d/c home with HHPT. Will continue to progress as able.          Problem: Inpatient Physical Therapy  Goal: Bed Mobility Goal LTG- PT  Outcome: Ongoing (interventions implemented as appropriate)    12/12/17 1735   Bed Mobility PT LTG   Bed Mobility PT LTG, Date Established 12/12/17   Bed Mobility PT LTG, Time to Achieve 3 days   Bed Mobility PT LTG, Activity Type supine to sit/sit to supine   Bed Mobility PT LTG, [place] Level conditional independence   Bed Mobility PT LTG, Date Goal Reviewed 12/12/17   Bed Mobility PT LTG, Outcome goal ongoing       Goal: Transfer Training Goal 1 LTG- PT  Outcome: Ongoing (interventions implemented as appropriate)    12/12/17 1735   Transfer Training PT LTG   Transfer Training PT LTG, Date Established 12/12/17   Transfer Training PT LTG, Time to Achieve 3 days   Transfer Training PT LTG, Activity Type sit to stand/stand to sit   Transfer Training PT LTG, [place] Level conditional independence   Transfer Training PT LTG, Assist Device walker, rolling   Transfer Training PT LTG, Date Goal Reviewed 12/12/17   Transfer Training PT LTG, Outcome goal ongoing       Goal: Gait Training Goal LTG- PT  Outcome: Ongoing (interventions implemented as appropriate)    12/12/17 1735   Gait Training PT LTG   Gait Training Goal PT LTG, Date Established  12/12/17   Gait Training Goal PT LTG, Time to Achieve 3 days   Gait Training Goal PT LTG, [place] Level conditional independence   Gait Training Goal PT LTG, Assist Device walker, rolling   Gait Training Goal PT LTG, Distance to Achieve 500 feet   Gait Training Goal PT LTG, Date Goal Reviewed 12/12/17   Gait Training Goal PT LTG, Outcome goal ongoing       Goal: Stair Training Goal LTG- PT  Outcome: Ongoing (interventions implemented as appropriate)    12/12/17 1735   Stair Training PT LTG   Stair Training Goal PT LTG, Date Established 12/12/17   Stair Training Goal PT LTG, Time to Achieve 3 days   Stair Training Goal PT LTG, Number of Steps 5   Stair Training Goal PT LTG, [place] Level contact guard assist   Stair Training Goal PT LTG, Assist Device cane, straight;other (see comments)  (HHA)   Stair Training Goal PT LTG, Date Goal Reviewed 12/12/17   Stair Training Goal PT LTG, Outcome goal ongoing       Goal: Range of Motion Goal LTG- PT  Outcome: Ongoing (interventions implemented as appropriate)    12/12/17 1735   Range of Motion PT LTG   Range of Motion Goal PT LTG, Date Established 12/12/17   Range of Motion Goal PT LTG, Time to Achieve 3 days   Range fo Motion Goal PT LTG, Joint R knee   Range of Motion Goal PT LTG, AAROM Measure 0-90 degrees   Range of Motion Goal PT LTG, Date Goal Reviewed 12/12/17   Range of Motion Goal PT LTG, Outcome goal ongoing

## 2017-12-12 NOTE — ANESTHESIA PROCEDURE NOTES
Peripheral Block    Patient location during procedure: post-op  Reason for block: at surgeon's request and post-op pain management  Performed by  Anesthesiologist: MOE GARY  Assisted by: KE SOLIS  Preanesthetic Checklist  Completed: patient identified, site marked, surgical consent, pre-op evaluation, timeout performed, IV checked, risks and benefits discussed and monitors and equipment checked  Prep:  Pt Position: supine  Sterile barriers:cap, gloves, mask and sterile barriers  Prep: ChloraPrep  Patient monitoring: blood pressure monitoring, continuous pulse oximetry and EKG  Procedure  Sedation:no  Performed under: spinal  Guidance:ultrasound guided  Images:still images obtained    Laterality:right  Block Type:adductor canal block  Injection Technique:catheter  Needle Type:Tuohy and echogenic  Needle Gauge:18 G  Resistance on Injection: none  Catheter Size:20 G (20g)  Cath Depth at skin: 10 cm  Medications  Preservative Free Saline:8ml  Local Injected:bupivacaine 0.25% Local Amount Injected:20 (ml)mL  Post Assessment  Injection Assessment: negative aspiration for heme, incremental injection and no paresthesia on injection  Patient Tolerance:comfortable throughout block  Complications:no  Additional Notes  Procedure:             The pt was placed in the Supine position.  The Insertion site was  prepped and Draped in sterile fashion.  The pt was anesthetized with  IV Sedation( see meds).  Skin and cutaneous tissue was infiltrated and anesthetized with 1% Lidocaine 3 mls via a 25g needle.  A BBraun 4 inch 18g echogenic needle was then  inserted approximately midline, mid-thigh and advanced In-plane with Ultrasound guidance.  Normal Saline PSF was utilized for hydrodissection of tissue.  The Vastus medialis and Sartorius muscle where visualized and the needle tip was placed in the adductor canal,  lateral to the femoral artery.  LA injection spread was visualized, injection was incremental 1-5ml,  injection pressure was normal or little, no intraneural injection, no vascular injection.  LA dose was injected thru the needle(see dose above).  A BBraun 20g wire stylet catheter was placed via the needle with ultrasound visualization and confirmation with NS fluid bolus. The labeled Catheter was then secured to skin at insertion site with skin afix and steristrips to curled catheter and CHG transparent dressing.  Thank you.

## 2017-12-13 VITALS
OXYGEN SATURATION: 99 % | SYSTOLIC BLOOD PRESSURE: 130 MMHG | WEIGHT: 205.03 LBS | HEART RATE: 60 BPM | DIASTOLIC BLOOD PRESSURE: 70 MMHG | TEMPERATURE: 97.7 F | HEIGHT: 73 IN | BODY MASS INDEX: 27.17 KG/M2 | RESPIRATION RATE: 16 BRPM

## 2017-12-13 LAB
ANION GAP SERPL CALCULATED.3IONS-SCNC: 5 MMOL/L (ref 3–11)
BUN BLD-MCNC: 14 MG/DL (ref 9–23)
BUN/CREAT SERPL: 15.6 (ref 7–25)
CALCIUM SPEC-SCNC: 8.5 MG/DL (ref 8.7–10.4)
CHLORIDE SERPL-SCNC: 105 MMOL/L (ref 99–109)
CO2 SERPL-SCNC: 25 MMOL/L (ref 20–31)
CREAT BLD-MCNC: 0.9 MG/DL (ref 0.6–1.3)
DEPRECATED RDW RBC AUTO: 43.8 FL (ref 37–54)
ERYTHROCYTE [DISTWIDTH] IN BLOOD BY AUTOMATED COUNT: 12.6 % (ref 11.3–14.5)
GFR SERPL CREATININE-BSD FRML MDRD: 84 ML/MIN/1.73
GLUCOSE BLD-MCNC: 133 MG/DL (ref 70–100)
GLUCOSE BLDC GLUCOMTR-MCNC: 102 MG/DL (ref 70–130)
GLUCOSE BLDC GLUCOMTR-MCNC: 110 MG/DL (ref 70–130)
HCT VFR BLD AUTO: 36.7 % (ref 38.9–50.9)
HGB BLD-MCNC: 12.1 G/DL (ref 13.1–17.5)
MCH RBC QN AUTO: 31.1 PG (ref 27–31)
MCHC RBC AUTO-ENTMCNC: 33 G/DL (ref 32–36)
MCV RBC AUTO: 94.3 FL (ref 80–99)
PLATELET # BLD AUTO: 209 10*3/MM3 (ref 150–450)
PMV BLD AUTO: 10.6 FL (ref 6–12)
POTASSIUM BLD-SCNC: 4.3 MMOL/L (ref 3.5–5.5)
RBC # BLD AUTO: 3.89 10*6/MM3 (ref 4.2–5.76)
SODIUM BLD-SCNC: 135 MMOL/L (ref 132–146)
WBC NRBC COR # BLD: 11.13 10*3/MM3 (ref 3.5–10.8)

## 2017-12-13 PROCEDURE — 85027 COMPLETE CBC AUTOMATED: CPT | Performed by: ORTHOPAEDIC SURGERY

## 2017-12-13 PROCEDURE — G0108 DIAB MANAGE TRN  PER INDIV: HCPCS

## 2017-12-13 PROCEDURE — 82962 GLUCOSE BLOOD TEST: CPT

## 2017-12-13 PROCEDURE — 25010000002 MORPHINE SULFATE (PF) 2 MG/ML SOLUTION: Performed by: ORTHOPAEDIC SURGERY

## 2017-12-13 PROCEDURE — 97116 GAIT TRAINING THERAPY: CPT

## 2017-12-13 PROCEDURE — 97110 THERAPEUTIC EXERCISES: CPT

## 2017-12-13 PROCEDURE — 97530 THERAPEUTIC ACTIVITIES: CPT | Performed by: OCCUPATIONAL THERAPIST

## 2017-12-13 PROCEDURE — 97166 OT EVAL MOD COMPLEX 45 MIN: CPT | Performed by: OCCUPATIONAL THERAPIST

## 2017-12-13 PROCEDURE — 25010000002 ROPIVACAINE HCL-NACL 0.2-0.9 % SOLUTION: Performed by: NURSE ANESTHETIST, CERTIFIED REGISTERED

## 2017-12-13 PROCEDURE — 25010000003 CEFAZOLIN IN DEXTROSE 2-4 GM/100ML-% SOLUTION: Performed by: ORTHOPAEDIC SURGERY

## 2017-12-13 PROCEDURE — 80048 BASIC METABOLIC PNL TOTAL CA: CPT | Performed by: NURSE PRACTITIONER

## 2017-12-13 RX ORDER — OXYCODONE HYDROCHLORIDE AND ACETAMINOPHEN 5; 325 MG/1; MG/1
1 TABLET ORAL EVERY 4 HOURS PRN
Status: DISCONTINUED | OUTPATIENT
Start: 2017-12-13 | End: 2017-12-13 | Stop reason: HOSPADM

## 2017-12-13 RX ORDER — ASPIRIN 81 MG/1
81 TABLET ORAL DAILY
Start: 2017-12-13

## 2017-12-13 RX ORDER — ROPIVACAINE IN 0.9% SOD CHL/PF 0.2% 545ML
12 ELASTOMERIC PUMP, HI VARIABLE RATE INJECTION CONTINUOUS
Status: DISCONTINUED | OUTPATIENT
Start: 2017-12-13 | End: 2017-12-13 | Stop reason: HOSPADM

## 2017-12-13 RX ORDER — NAPROXEN SODIUM 220 MG
440 TABLET ORAL 2 TIMES DAILY PRN
Start: 2017-12-13 | End: 2018-07-31

## 2017-12-13 RX ORDER — ROPIVACAINE IN 0.9% SOD CHL/PF 0.2% 545ML
12 ELASTOMERIC PUMP, HI VARIABLE RATE INJECTION CONTINUOUS
Start: 2017-12-13 | End: 2018-07-31

## 2017-12-13 RX ORDER — OXYCODONE HYDROCHLORIDE AND ACETAMINOPHEN 5; 325 MG/1; MG/1
1 TABLET ORAL EVERY 4 HOURS PRN
Qty: 40 TABLET | Refills: 0 | Status: SHIPPED | OUTPATIENT
Start: 2017-12-13 | End: 2017-12-23

## 2017-12-13 RX ORDER — PSEUDOEPHEDRINE HCL 30 MG
1 TABLET ORAL 2 TIMES DAILY PRN
Qty: 60 CAPSULE | Refills: 0 | Status: SHIPPED | OUTPATIENT
Start: 2017-12-13 | End: 2018-07-31

## 2017-12-13 RX ADMIN — MELOXICAM 15 MG: 7.5 TABLET ORAL at 09:03

## 2017-12-13 RX ADMIN — Medication 2 TABLET: at 09:03

## 2017-12-13 RX ADMIN — Medication 12 ML/HR: at 13:43

## 2017-12-13 RX ADMIN — OXYCODONE AND ACETAMINOPHEN 1 TABLET: 5; 325 TABLET ORAL at 14:13

## 2017-12-13 RX ADMIN — ASPIRIN 325 MG: 325 TABLET, DELAYED RELEASE ORAL at 09:03

## 2017-12-13 RX ADMIN — CEFAZOLIN SODIUM 2 G: 2 INJECTION, SOLUTION INTRAVENOUS at 04:23

## 2017-12-13 RX ADMIN — HYDROCODONE BITARTRATE AND ACETAMINOPHEN 1 TABLET: 7.5; 325 TABLET ORAL at 00:56

## 2017-12-13 RX ADMIN — MORPHINE SULFATE 4 MG: 2 INJECTION, SOLUTION INTRAMUSCULAR; INTRAVENOUS at 00:56

## 2017-12-13 NOTE — PLAN OF CARE
Problem: Patient Care Overview (Adult)  Goal: Plan of Care Review  Outcome: Outcome(s) achieved Date Met:  12/13/17 12/13/17 1905   Coping/Psychosocial Response Interventions   Plan Of Care Reviewed With patient   Outcome Evaluation   Outcome Summary/Follow up Plan Pt s/p TKA and now with good overall pain. Plan to d/c home with family assist. Pt demonstrates good safety and ability to perform ADLs with AE or family assist.

## 2017-12-13 NOTE — PROGRESS NOTES
Discharge Planning Assessment  [facility]     Patient Name: Yang Haji  MRN: [id number]  Today's Date: 12/13/2017    Admit Date: 12/12/2017          Discharge Needs Assessment       12/13/17 1057    Living Environment    Lives With spouse    Living Arrangements house    Transportation Available car;family or friend will provide    Living Environment Comment Patient lives with wife, Evelia, in a ranch with basement home in [place].  She will assist him at home.      Living Environment    Provides Primary Care For no one    Quality Of Family Relationships supportive;helpful;involved    Able to Return to Prior Living Arrangements yes    Living Arrangement Comments Patient lives with wife, Evelia, in a ranch with basement home in [place].  She will assist him at home.      Discharge Needs Assessment    Concerns To Be Addressed adjustment to diagnosis/illness concerns;discharge planning concerns    Readmission Within The Last 30 Days no previous admission in last 30 days    Outpatient/Agency/Support Group Needs homecare agency (specify level of care)    Equipment Currently Used at Home walker, rolling;raised toilet    Discharge Facility/Level Of Care Needs home with home health            Discharge Plan       12/13/17 1052    Case Management/Social Work Plan    Plan Home with wife & home health    Additional Comments Spoke with patient and wife at bedside.  He lives with her in a ranch with basement home in [place].  She will assist him at home.  He has been independent with ADLs prior to surgery.  Denies HH.  He has a rolling walker and elevated toilet seat at home.  Denies AD/LW/POA.  Has Rx drug coverage through [employer] Medicare plan.  Plan is home with home health PT and he has chosen [facility].  I will call referral to them.  Denies any needs for additional DME.  CM will follow.          Discharge Placement     No information found        Expected Discharge Date and Time     Expected Discharge  Date Expected Discharge Time    Dec 13, 2017               Demographic Summary       12/13/17 1046    Referral Information    Admission Type inpatient    Referral Source physician    Reason For Consult discharge planning    Record Reviewed history and physical    Contact Information    Permission Granted to Share Information With     Primary Care Physician Information    Name Jamarcus Harmon            Functional Status     None            Psychosocial     None            Abuse/Neglect     None            Legal     None            Substance Abuse     None            Patient Forms     None          Lillie Colon

## 2017-12-13 NOTE — CONSULTS
"Diabetes Education  Assessment/Teaching    Patient Name:  Yang Haji  YOB: 1950  MRN: [id number]  Admit Date:  12/12/2017      Assessment Date:  12/13/2017       Most Recent Value    General Information      Referral From:  MD Naomy order    Height  185.4 cm (72.99\")    Height Method  Stated    Weight  93 kg (205 lb 0.4 oz)    Weight Method  Standing scale    Are you currently involved in an activity/exercise program?   Yes    Pregnancy Assessment     Diabetes History     What type of diabetes do you have?  Pre-diabetes    Length of Diabetes Diagnosis  Newly diagnosed <6 months    Education Preferences     Nutrition Information     Assessment Topics     Healthy Eating - Assessment  Competent    Being Active - Assessment  Competent    Taking Medication - Assessment  Competent    Problem Solving - Assessment  Competent    Reducing Risk - Assessment  Competent    Healthy Coping - Assessment  Competent    Monitoring - Assessment  Competent    DM Goals                Most Recent Value    DM Education Needs     Meter  Meter provided    Meter Type  Contour    Frequency of Testing  3 times a week    Blood Glucose Target  100    Blood Glucose Target Range  100-125 per ADA recommendations    Problem Solving  Hypoglycemia, Hyperglycemia, Signs, Symptoms, Treatment    Reducing Risks  A1C testing    Physical Activity  Walking    Physical Activity Frequency  Regularly    Healthy Coping  Appropriate    Discharge Plan  Home    Motivation  Engaged    Teaching Method  Explanation, Discussion, Demonstration, Handouts, Teach back    Patient Response  Verbalized understanding            Other Comments:  Patient educated on Pre-diabetes, diabetes and the disease process, types of DM, diagnosis/A1C, monitoring, signs and symptoms, activity and exercise and how to prevent disease from progressing. Pt educated on current A1C of 6%.  Changing behavior and goal setting relative to diet, exercise and healthy lifestyle was " emphasized. Pt reports when he feels well he exercises regularly and looks forward to restarting that activity upon MD approval. Patient provided a new donated meter and taught how to use it. Pt. was also advised to contact PCP for prescription for lancets and strips. Pt. verbalized understanding and also completed a return demonstration on using the meter using Teach Back method. Pt advised to consult with PCP for further instructions, discuss A1C result, and POC. Education handouts provided, questions answered and pt. advised to call with any other questions or concerns.        Electronically signed by:  Kate Figueroa RN  12/13/17 1:21 PM

## 2017-12-13 NOTE — DISCHARGE SUMMARY
Patient Name: Yang Haji  MRN: [id number]  : 1950  DOS: 2017    Attending: No att. providers found    Primary Care Provider: Jamarcus Harmon MD    Date of Admission:.2017  9:12 AM    Date of Discharge:  2017    Discharge Diagnosis: Principal Problem:    S/P total knee arthroplasty, right  Active Problems:    Primary osteoarthritis of right knee    Tobacco abuse    Prediabetes      Hospital Course  Patient is a 67 y.o. male presented for right total knee arthroplasty  by Dr. Leal under spinal anesthesia. He tolerated surgery well and was admitted for further medical management. His knee has been painful for many years. He denies use of assistive device for ambulation. Conservative treatments failed to provide pain relief.      Patient was provided pain medications as needed for pain control, along with adductor canal nerve block infusion of Ropivacaine.      He was seen by PT and OT and has progressed well over his stay.  He used an IS for atelectasis prophylaxis and aspirin along with mechanicals for DVT prophylaxis.  Home medications were resumed as appropriate, and labs were monitored and remained fairly stable.     His Hgb A1C was elevated on his pre-op labs. A diabetes educator provided him with diabetic education and a glucometer.    With the progress he has made, he is ready for DC home today.    A prineo dressing is in place and is to remain for 2 weeks.    He will have an On Q pump ( instructed on it during this admit)  Discussed with patient regarding plan and he shows understanding and agreement.    Patient will have HHPT following discharge.      Procedures Performed  DATE OF PROCEDURE: 17      SURGEON: Nazario Leal MD     ASSISTANT:  Francesca Chowdhury PA-C     PREOPERATIVE DIAGNOSIS: Right knee arthritis       POSTOPERATIVE DIAGNOSIS:  Right knee arthritis     PROCEDURES PERFORMED:   Right total knee arthroplasty with DePuy Attune components (# 8 posterior  "stabilized femur, # 8 rotating platform tibia, 6 mm rotating platform polyethylene, with 41 mm three peg anatomic patella).        Pertinent Test Results:    I reviewed the patient's new clinical results.     Results from last 7 days  Lab Units 12/13/17  0433   WBC 10*3/mm3 11.13*   HEMOGLOBIN g/dL 12.1*   HEMATOCRIT % 36.7*   PLATELETS 10*3/mm3 209       Results from last 7 days  Lab Units 12/13/17  0433   SODIUM mmol/L 135   POTASSIUM mmol/L 4.3   CHLORIDE mmol/L 105   CO2 mmol/L 25.0   BUN mg/dL 14   CREATININE mg/dL 0.90   CALCIUM mg/dL 8.5*   GLUCOSE mg/dL 133*     Results for RINA FRAGA (MRN [id number]) as of 12/14/2017 08:25   Ref. Range 12/12/2017 16:32 12/12/2017 20:17 12/13/2017 04:33 12/13/2017 07:54 12/13/2017 11:42   Glucose Latest Ref Range: 70 - 130 mg/dL 130 198 (H) 133 (H) 110 102     I reviewed the patient's new imaging including images and reports.      Physical therapy: Patient ambulated 800 feet with RW and step through gait pattern. Completed stair training with crutches and had no difficulty. ROM progressing well, 11-75 degrees, limited by pain. Patient has been d/c home with HHPT today.     Discharge Assessment:    Vital Signs  /70  Pulse 60  Temp 97.7 °F (36.5 °C) (Tympanic)   Resp 16  Ht 185.4 cm (72.99\")  Wt 93 kg (205 lb 0.4 oz)  SpO2 99%  BMI 27.06 kg/m2  No data recorded.      General Appearance:    Alert, cooperative, in no acute distress   Lungs:     Clear to auscultation,respirations regular, even and                   unlabored    Heart:    Regular rhythm and normal rate, normal S1 and S2   Abdomen:     Normal bowel sounds, no masses, no organomegaly, soft        non-tender, non-distended, no guarding, no rebound                 tenderness   Extremities:   Moves all extremities well, no edema, no cyanosis, no              Redness. Right knee with 4x4s and TEDs CDI. Nerve block present   Pulses:   Pulses palpable and equal bilaterally   Skin:   No bleeding, " bruising or rash   Neurologic:   Cranial nerves 2 - 12 grossly intact, sensation intact. Flexion and dorsiflexion intact bilateral feet.       Discharge Disposition: Home    Discharge Medications   Yang Haji   Home Medication Instructions ITZEL:[id number]    Printed on:12/15/17 1476   Medication Information                      aspirin 325 MG EC tablet  Take 1 tablet by mouth Daily for 1 month             aspirin 81 MG EC tablet  Take 1 tablet by mouth Daily. Resume in 1 month             docusate sodium 100 MG capsule  Take 1 capsule by mouth 2 (Two) Times a Day As Needed for Constipation.             melatonin 5 MG tablet tablet  Take 10 mg by mouth Every Night.             naproxen sodium (ALEVE) 220 MG tablet  Take 2 tablets by mouth 2 (Two) Times a Day As Needed for Mild Pain . Must take an hour after aspirin if needed.             oxyCODONE-acetaminophen (PERCOCET) 5-325 MG per tablet  Take 1 tablet by mouth Every 4 (Four) Hours As Needed for Moderate Pain  for up to 10 days.             Ropivacaine HCl-NaCl (NAROPIN) 0.2-0.9 %  24 mg/hr by Peripheral Nerve route Continuous.                 Discharge Diet: Consistent carb diet    Activity at Discharge: WBAT RLE    Follow-up Appointments  Dr. Leal per his orders    Discharge took over 30 min.  Seen and examined by me. Agree with above.  Discussed with pt discharge instructions, and answered all questions.      Lulu Salazar MD  12/13/17  10:58 AM

## 2017-12-13 NOTE — PLAN OF CARE
Problem: Patient Care Overview (Adult)  Goal: Plan of Care Review  Outcome: Ongoing (interventions implemented as appropriate)    12/13/17 9186   Coping/Psychosocial Response Interventions   Plan Of Care Reviewed With patient   Patient Care Overview   Progress no change   Outcome Evaluation   Outcome Summary/Follow up Plan Patient had moderate complaints of pain through the night. Ropivacaine was increased last night at about 1830. PRN IV and PO medications given this shift. Up with assistance x1 and walker. Voiding adequately.          Problem: Perioperative Period (Adult)  Goal: Signs and Symptoms of Listed Potential Problems Will be Absent or Manageable (Perioperative Period)  Outcome: Ongoing (interventions implemented as appropriate)

## 2017-12-13 NOTE — PLAN OF CARE
Problem: Patient Care Overview (Adult)  Goal: Plan of Care Review  Outcome: Ongoing (interventions implemented as appropriate)    12/13/17 0820   Coping/Psychosocial Response Interventions   Plan Of Care Reviewed With patient;spouse   Patient Care Overview   Progress progress toward functional goals as expected   Outcome Evaluation   Outcome Summary/Follow up Plan Patient ambulated 800 feet with RW and step through gait pattern. Completed stair training with crutches and had no difficulty. ROM progressing well, 11-75 degrees, limited by pain. Patient has been d/c home with HHPT today.          Problem: Inpatient Physical Therapy  Goal: Bed Mobility Goal LTG- PT  Outcome: Unable to achieve outcome(s) by discharge Date Met:  12/13/17 12/13/17 0820   Bed Mobility PT LTG   Bed Mobility PT LTG, Date Established 12/12/17   Bed Mobility PT LTG, Time to Achieve 3 days   Bed Mobility PT LTG, Activity Type supine to sit/sit to supine   Bed Mobility PT LTG, [place] Level conditional independence   Bed Mobility PT LTG, Date Goal Reviewed 12/13/17   Bed Mobility PT LTG, Outcome goal not met   Bed Mobility PT LTG, Reason Goal Not Met discharged from facility       Goal: Transfer Training Goal 1 LTG- PT  Outcome: Unable to achieve outcome(s) by discharge Date Met:  12/13/17 12/13/17 0820   Transfer Training PT LTG   Transfer Training PT LTG, Date Established 12/12/17   Transfer Training PT LTG, Time to Achieve 3 days   Transfer Training PT LTG, Activity Type sit to stand/stand to sit   Transfer Training PT LTG, [place] Level conditional independence   Transfer Training PT LTG, Assist Device walker, rolling   Transfer Training PT LTG, Date Goal Reviewed 12/13/17   Transfer Training PT LTG, Outcome goal not met   Transfer Training PT LTG, Reason Goal Not Met discharged from facility       Goal: Gait Training Goal LTG- PT  Outcome: Unable to achieve outcome(s) by discharge Date Met:  12/13/17 12/13/17 0820   Gait  Training PT LTG   Gait Training Goal PT LTG, Date Established 12/12/17   Gait Training Goal PT LTG, Time to Achieve 3 days   Gait Training Goal PT LTG, [place] Level conditional independence   Gait Training Goal PT LTG, Assist Device walker, rolling   Gait Training Goal PT LTG, Distance to Achieve 500 feet   Gait Training Goal PT LTG, Date Goal Reviewed 12/13/17   Gait Training Goal PT LTG, Outcome goal partially met  (gait distance achieved but not at level of assist)   Gait Training Goal PT LTG, Reason Goal Not Met discharged from facility       Goal: Stair Training Goal LTG- PT  Outcome: Unable to achieve outcome(s) by discharge Date Met:  12/13/17 12/13/17 0820   Stair Training PT LTG   Stair Training Goal PT LTG, Date Established 12/12/17   Stair Training Goal PT LTG, Time to Achieve 3 days   Stair Training Goal PT LTG, Number of Steps 5   Stair Training Goal PT LTG, [place] Level contact guard assist   Stair Training Goal PT LTG, Assist Device cane, straight;other (see comments)  (HHA)   Stair Training Goal PT LTG, Date Goal Reviewed 12/13/17   Stair Training Goal PT LTG, Outcome goal not met   Stair Training Goal PT LTG, Reason Goal Not Met discharged from facility       Goal: Range of Motion Goal LTG- PT  Outcome: Unable to achieve outcome(s) by discharge Date Met:  12/13/17 12/13/17 0820   Range of Motion PT LTG   Range of Motion Goal PT LTG, Date Established 12/12/17   Range of Motion Goal PT LTG, Time to Achieve 3 days   Range fo Motion Goal PT LTG, Joint R knee   Range of Motion Goal PT LTG, AAROM Measure 0-90 degrees   Range of Motion Goal PT LTG, Date Goal Reviewed 12/13/17   Range of Motion Goal PT LTG, Outcome goal not met   Reason Goal Not Met (ROM) PT LTG discharged from facility

## 2017-12-13 NOTE — THERAPY DISCHARGE NOTE
Acute Care - Occupational Therapy Initial Eval/Discharge  [facility]     Patient Name: Yang Haji  : 1950  MRN: [id number]  Today's Date: 2017  Onset of Illness/Injury or Date of Surgery Date: 17  Date of Referral to OT: 17  Referring Physician: MD Elvis      Admit Date: 2017       ICD-10-CM ICD-9-CM   1. Impaired functional mobility, balance, gait, and endurance Z74.09 V49.89   2. Primary osteoarthritis of right knee M17.11 715.16   3. Impaired mobility and ADLs Z74.09 799.89   4. S/P total knee arthroplasty, right Z96.651 V43.65     Patient Active Problem List   Diagnosis   • Primary osteoarthritis of right knee   • S/P total knee arthroplasty, right   • Tobacco abuse   • Prediabetes     Past Medical History:   Diagnosis Date   • Adult BMI 28.0-28.9 kg/sq m    • Back pain    • Chronic pain of right knee    • PONV (postoperative nausea and vomiting)    • Primary osteoarthritis of right knee    • Staph infection 1974    right knee, treated with antibiotics    • Wears glasses      Past Surgical History:   Procedure Laterality Date   • COLONOSCOPY  10/2017   • KNEE SURGERY Right     debridement d/t staph infection   • LASIK Bilateral    • MEDIAL COLLATERAL LIGAMENT REPAIR, KNEE Right    • RHIZOTOMY     • TOTAL KNEE ARTHROPLASTY Right 2017    Procedure: TOTAL KNEE ARTHROPLASTY RIGHT;  Surgeon: Nazario Leal MD;  Location: [facility];  Service:           OT ASSESSMENT FLOWSHEET (last 72 hours)      OT Evaluation       17 1357 17 1057 17 0820 17 0746 17 1735    Rehab Evaluation    Document Type   therapy note (daily note);discharge summary  -LR evaluation;therapy note (daily note);discharge summary  -ST evaluation  -LR    Subjective Information   agree to therapy;complains of;pain  -LR no complaints;agree to therapy  -ST agree to therapy;complains of;numbness;pain   reports some numbness still remaining in LEs from spinal.   -LR     Patient Effort, Rehab Treatment   excellent  -LR good  -ST good  -LR    Symptoms Noted During/After Treatment   increased pain  -LR increased pain  -ST fatigue;increased pain  -LR    Symptoms Noted Comment   CRNA increased rate of nerve cath and delivered bolus.   -LR increased on-Q/bolus  -ST Notified RN.   -LR    General Information    Patient Profile Review    yes  -ST yes  -LR    Onset of Illness/Injury or Date of Surgery Date    12/12/17  -ST 12/12/17  -LR    Referring Physician    MD Elvis  -ST MD Elvis  -LR    General Observations    UIC upon arrival; nerve cath intact; wife present   -ST Patient supine in bed upon arrival. IV, SCDs, R knee ace bandaged, R adductor canal nerve catheter.   -LR    Pertinent History Of Current Problem    Patient presents for surgical management of persistent and progressive R knee pain and dysfunction that has failed to improve with conservative management. Symptoms impacted ADL performance and mobility. Now s/p R TKA   -ST Patient presents for surgical management of persistent and progressive R knee pain and dysfunction that has failed to improve with conservative management.   -LR    Precautions/Limitations   fall precautions;other (see comments)   R adductor canal nerve catheter  -LR fall precautions;other (see comments)   adductor canal cath  -ST fall precautions;other (see comments)   R adductor canal nerve catheter, some numbness/knee buckling  -LR    Prior Level of Function    independent:;all household mobility;bed mobility;feeding;grooming;min assist:;community mobility;dressing;bathing;home management   trouble with LBD/LBB and mobility d/t pain and dec. ROM  -ST min assist:;all household mobility;community mobility;gait;transfer;bed mobility;home management;cooking;cleaning;shopping;using stairs;independent:;driving   all mobility limited by pain.   -LR    Equipment Currently Used at Home  walker, rolling;raised toilet  -  cane, quad;cane, straight;commode;raised  toilet  -ST cane, quad;cane, straight;commode;raised toilet   pt unsure if he has a shower chair versus tub bench  -LR    Plans/Goals Discussed With    patient;spouse/S.O.;agreed upon  -ST patient;spouse/S.O.;agreed upon  -LR    Risks Reviewed    patient:;spouse/S.O.:;LOB;nausea/vomiting;dizziness;increased discomfort;lines disloged  -ST patient:;spouse/S.O.:;LOB;nausea/vomiting;dizziness;increased discomfort;lines disloged  -LR    Benefits Reviewed    patient:;spouse/S.O.:;improve function;increase independence;increase strength;increase balance;decrease pain;increase knowledge  -ST patient:;spouse/S.O.:;improve function;increase independence;increase strength;increase balance;decrease pain;increase knowledge  -LR    Barriers to Rehab    previous functional deficit  -ST previous functional deficit  -LR    Living Environment    Lives With  spouse  -  spouse  - spouse  -LR    Living Arrangements  house  -  house  -ST house  -LR    Home Accessibility    bed and bath on same level;house is not wheelchair accessible;stairs to enter home;stairs within home;tub/shower is not walk in;other (see comments)  -ST bed and bath on same level;house is not wheelchair accessible;stairs to enter home;stairs within home;tub/shower is not walk in;other (see comments)   tub shower and walk in shower  -LR    Number of Stairs to Enter Home    5  -ST 5  -LR    Number of Stairs Within Home     --   does not have to access second floor.   -LR    Stair Railings at Home     none  -LR    Type of Financial/Environmental Concern     none  -LR    Transportation Available  car;family or friend will provide  -   family or friend will provide  -LR    Living Environment Comment  Patient lives with wife, Evelia, in a ranch with basement home in [place] [employer] [place] [employer]  She will assist him at home.    -  lives with wife who can assist prn; daughter is a PT  -ST Patient can also access home from garage that is at least 10 steps with handrail on R to get to  main floor.   -LR    Clinical Impression    Date of Referral to OT    12/13/17  -ST     OT Diagnosis    impaired ADLs  -ST     Prognosis    good  -ST     Patient/Family Goals Statement    return home  -ST     Criteria for Skilled Therapeutic Interventions Met    yes  -ST     Rehab Potential    good, to achieve stated therapy goals  -ST     Therapy Frequency    daily  -ST     Anticipated Equipment Needs At Discharge    --   recommend reacher and sock-aide  -ST     Anticipated Discharge Disposition home with assist;home with home health  -ST   home with assist;home with home health  -ST     Pain Assessment    Pain Assessment   0-10  -LR 0-10  -ST 0-10  -LR    Pain Score   3  -LR 3  -ST 1  -AS (r) LR (t) AS (c)    Post Pain Score   5  -LR 5  -ST 3  -LR    Pain Type   Acute pain  -LR Acute pain  -ST Acute pain  -LR    Pain Location   Knee  -LR Knee  -ST Knee  -LR    Pain Orientation   Right;Anterior  -LR Right;Anterior  -ST Right;Anterior  -LR    Pain Intervention(s)   Repositioned;Ambulation/increased activity  -LR Repositioned;Ambulation/increased activity;Cold applied  -ST Repositioned;Ambulation/increased activity;Cold applied  -LR    Vision Assessment/Intervention    Visual Impairment    WFL  -ST WFL  -LR    Cognitive Assessment/Intervention    Current Cognitive/Communication Assessment   functional  -LR functional  -ST functional  -LR    Orientation Status   oriented x 4;required verbal cueing (specifiy in comments)  -LR oriented x 4;required verbal cueing (specifiy in comments)  -ST oriented x 4;required verbal cueing (specifiy in comments)  -LR    Follows Commands/Answers Questions   100% of the time;able to follow single-step instructions;needs cueing;needs repetition  -% of the time;able to follow single-step instructions;needs cueing;needs repetition  -% of the time;able to follow single-step instructions;needs cueing;needs repetition  -LR    Personal Safety   WNL/WFL  -LR WNL/WFL  -ST WNL/WFL   -LR    Personal Safety Interventions    fall prevention program maintained;gait belt;nonskid shoes/slippers when out of bed  -ST     ROM (Range of Motion)    General ROM    no range of motion deficits identified  -ST lower extremity range of motion deficits identified  -LR    MMT (Manual Muscle Testing)    General MMT Assessment    no strength deficits identified  -ST lower extremity strength deficits identified  -LR    Mobility Assessment/Training    Extremity Weight-Bearing Status   right lower extremity  -LR right lower extremity  -ST right lower extremity  -LR    Right Lower Extremity Weight-Bearing   weight-bearing as tolerated  -LR weight-bearing as tolerated  -ST weight-bearing as tolerated  -LR    Bed Mobility, Assessment/Treatment    Bed Mobility, Assistive Device     head of bed elevated;bed rails  -LR    Bed Mob, Supine to Sit, [place]   not tested   UIC on arrival.   -LR  verbal cues required;minimum assist (75% patient effort)  -LR    Bed Mob, Sit to Supine, [place]   not tested   UIC at end of treatment.   -LR  not tested   UIC at end of eval.   -LR    Bed Mobility, Safety Issues     decreased use of legs for bridging/pushing  -LR    Bed Mobility, Impairments     ROM decreased;strength decreased;pain;sensation decreased  -LR    Bed Mobility, Comment    UIC upon arrival  -ST Verbal cues to move LEs towards EOB and to push up from bed from behind to raise trunk into sitting and to scoot hips out to get feet on floor. Min assist to move R LE. Denied dizziness upon sitting up.   -LR    Transfer Assessment/Treatment    Transfers, Sit-Stand [place]   verbal cues required;contact guard assist  -LR contact guard assist  -ST verbal cues required;contact guard assist;2 person assist required  -LR    Transfers, Stand-Sit [place]   verbal cues required;contact guard assist  -LR contact guard assist  -ST verbal cues required;contact guard assist;2 person assist required  -LR    Transfers,  Sit-Stand-Sit, Assist Device   rolling walker  -LR rolling walker  -ST rolling walker  -LR    Transfer, Safety Issues   sequencing ability decreased;step length decreased;weight-shifting ability decreased  -LR  sequencing ability decreased;step length decreased;weight-shifting ability decreased;knees buckling  -LR    Transfer, Impairments   ROM decreased;strength decreased;pain  -LR strength decreased;pain  -ST ROM decreased;strength decreased;sensation decreased;pain  -LR    Transfer, Comment   Verbal cues to push up from chair to stand and to reach back for chair to lower into sitting. Verbal cues to step R LE out before t/f for comfort.   -LR vc'ing to push from surface; extension of sx LE for pain control   -ST Verbal cues to push up from bed to stand and to reach back for chair to lower into sitting. Verbal cues to step R LE out before t/f for comfort. Patient initially stood and did not bear weight on R LE and took a few hops on L LE towards chair. Cued patient to weight shift onto R LE to assess strength. Mild knee buckling but able to take steps, compensating with UE weight bearing.   -LR    Functional Mobility    Functional Mobility- Ind. Level    contact guard assist  -ST     Functional Mobility- Device    rolling walker  -ST     Functional Mobility-Distance (Feet)    200  -ST     Functional Mobility- Comment    good balance and safety, however mild knee buckling w/sx LE with instance of pain   -ST     Stairs Assessment/Treatment    Number of Stairs   5  -LR      Stairs, Handrail Location   none  -LR      Stairs, Cross Level   verbal cues required;contact guard assist;2 person assist required  -LR      Stairs, Assistive Device   axillary crutches  -LR      Stairs, Technique Used   step to step (ascending);step to step (descending)  -LR      Stairs, Safety Issues   sequencing ability decreased;weight-shifting ability decreased  -LR      Stairs, Impairments   ROM decreased;strength decreased;pain  -LR       Stairs, Comment   Verbal cues to take one step at a time and to step up with strong LE first and down with weak LE first. Cues for weight bearing on R LE as patient tended to keep it off the floor when using crutches. Verbal cues for correct placement and advancement/sequencing of crutches.   -LR OT assisted with lines for safety  -ST     Lower Body Dressing Assessment/Training    LB Dressing Assess/Train, Clothing Type    doffing:;donning:;pants;slipper socks  -     LB Dressing Assess/Train, Assist Device    reacher;sock-aid  -     LB Dressing Assess/Train, Position    sitting  -     LB Dressing Assess/Train, [place]    supervision required  -     LB Dressing Assess/Train, Impairments    ROM decreased;pain  -ST     Toileting Assessment/Training    Toileting Assess/Train, Comment    education on toileting safety   -ST     Therapy Exercises    Exercise Protocols   total knee  -LR  total knee  -LR    Total Knee Exercises   right:;15 reps;completed protocol;ankle pumps/circles;quad set;glut set;heel slide stretch;SLR;SAQ;heel slides;LAQ   cues for technique;no assist required,SLR improved with reps  -LR  right:;10 reps;ankle pumps/circles;quad set;glut set   cues for technique; poor quad set  -LR    Sensory Assessment/Intervention    Sensory Impairment     --   c/o numbness R LE;able to actively DF bilaterally  -LR    Light Touch    LUE;RUE  -ST     LUE Light Touch    WNL  -ST     RUE Light Touch    WNL  -ST     General Therapy Interventions    Adaptive Equipment Training    completed AE training; pt to obtain needed AE from online source as needed   -ST     Positioning and Restraints    Pre-Treatment Position   sitting in chair/recliner  -LR sitting in chair/recliner  -ST in bed  -LR    Post Treatment Position   chair  -LR chair  -ST chair  -LR    In Chair   notified nsg;reclined;sitting;call light within reach;encouraged to call for assist;with family/caregiver;with OT;legs elevated  -LR notified  nsg;reclined;call light within reach;encouraged to call for assist;with family/caregiver  -ST notified nsg;reclined;sitting;call light within reach;encouraged to call for assist;with family/caregiver;compression device;legs elevated;RLE elevated  -LR    General LE Assessment    ROM     LLE ROM was WFL;knee, right: LE ROM deficit  -LR    ROM Detail   11-75 degrees; lacking 11 degrees of extension AROM, 75 degrees flexion AAROM in sitting.   -LR  R knee AROM impaired 25%  -LR    Lower Extremity    Lower Ext Manual Muscle Testing     left LE strength is WFL;right knee strength deficit  -LR    Lower Ext Manual Muscle Testing Detail     R knee functionally 2/5 initially, improved with mobility; knee buckling initially, improved as gait progressed.   -LR      12/12/17 1700 12/12/17 1004             General Information    Equipment Currently Used at Home  none  -NM       Living Environment    Lives With  spouse  -NM       Living Arrangements  house  -NM       Transportation Available  car;family or friend will provide  -NM       Functional Level Prior    Ambulation 0-->independent  -AS 0-->independent  -NM       Transferring 0-->independent  -AS 0-->independent  -NM       Toileting 0-->independent  -AS 0-->independent  -NM       Bathing 0-->independent  -AS 0-->independent  -NM       Dressing 0-->independent  -AS 0-->independent  -NM       Eating 0-->independent  -AS 0-->independent  -NM       Communication 0-->understands/communicates without difficulty  -AS 0-->understands/communicates without difficulty  -NM       Swallowing 0-->swallows foods/liquids without difficulty  -AS 0-->swallows foods/liquids without difficulty  -NM       Prior Functional Level Comment n/a  -AS          User Key  (r) = Recorded By, (t) = Taken By, (c) = Cosigned By    Initials Name Effective Dates    ST Caitlin ROMAN Aisha, OTR 02/20/17 -     LR Xochitl Veronica, PT 06/19/15 -      Lillie Colon 05/02/16 -     NM Maxine Zazueta, AUDELIA 06/16/16  -     AS Chaparro Nielson RN 06/16/16 -           Occupational Therapy Education     Title: PT OT SLP Therapies (Done)     Topic: Occupational Therapy (Done)     Point: ADL training (Done)    Description: Instruct learner(s) on proper safety adaptation and remediation techniques during self care or transfers.   Instruct in proper use of assistive devices.    Learning Progress Summary    Learner Readiness Method Response Comment Documented by Status   Patient Acceptance E,TB,D,H VU,DU role of OT, leg  use, AE, LBD, home safety, benefits of activity, management of nerve cath with LB clothing  12/13/17 [facility] Done   Family Acceptance E,TB,D,H VU,DU role of OT, leg  use, AE, LBD, home safety, benefits of activity, management of nerve cath with LB clothing  12/13/17 135 Done               Point: Home exercise program (Done)    Description: Instruct learner(s) on appropriate technique for monitoring, assisting and/or progressing therapeutic exercises/activities.    Learning Progress Summary    Learner Readiness Method Response Comment Documented by Status   Patient Acceptance E,TB,D,H VU,DU role of OT, leg  use, AE, LBD, home safety, benefits of activity, management of nerve cath with LB clothing  12/13/17 135 Done   Family Acceptance E,TB,D,H VU,DU role of OT, leg  use, AE, LBD, home safety, benefits of activity, management of nerve cath with LB clothing  12/13/17 135 Done               Point: Precautions (Done)    Description: Instruct learner(s) on prescribed precautions during self-care and functional transfers.    Learning Progress Summary    Learner Readiness Method Response Comment Documented by Status   Patient Acceptance E,TB,D,H VU,DU role of OT, leg  use, AE, LBD, home safety, benefits of activity, management of nerve cath with LB clothing  12/13/17 135 Done   Family Acceptance E,TB,D,H VU,DU role of OT, leg  use, AE, LBD, home safety, benefits of activity,  management of nerve cath with LB clothing ST 12/13/17 1356 Done               Point: Body mechanics (Done)    Description: Instruct learner(s) on proper positioning and spine alignment during self-care, functional mobility activities and/or exercises.    Learning Progress Summary    Learner Readiness Method Response Comment Documented by Status   Patient Acceptance E,TB,D,H GABRIELA,MEME role of OT, leg  use, AE, LBD, home safety, benefits of activity, management of nerve cath with LB clothing ST 12/13/17 1356 Done   Family Acceptance E,TB,D,H GABRIELA,DU role of OT, leg  use, AE, LBD, home safety, benefits of activity, management of nerve cath with LB clothing ST 12/13/17 1356 Done                      User Key     Initials Effective Dates Name Provider Type Discipline    ST 02/20/17 -  Caitlin Leonard OTR Occupational Therapist OT                OT Recommendation and Plan  Anticipated Equipment Needs At Discharge:  (recommend reacher and sock-aide)  Anticipated Discharge Disposition: home with assist, home with home health  Therapy Frequency: daily  Plan of Care Review  Plan Of Care Reviewed With: patient  Outcome Summary/Follow up Plan: Pt s/p TKA and now with good overall pain. Plan to d/c home with family assist. Pt demonstrates good safety and ability to perform ADLs with AE or family assist.                Outcome Measures       12/13/17 0820 12/13/17 0746 12/12/17 1735    How much help from another person do you currently need...    Turning from your back to your side while in flat bed without using bedrails? 3  -LR  3  -LR    Moving from lying on back to sitting on the side of a flat bed without bedrails? 3  -LR  3  -LR    Moving to and from a bed to a chair (including a wheelchair)? 3  -LR  3  -LR    Standing up from a chair using your arms (e.g., wheelchair, bedside chair)? 3  -LR  3  -LR    Climbing 3-5 steps with a railing? 3  -LR  1  -LR    To walk in hospital room? 3  -LR  3  -LR    AM-PAC 6 Clicks  Score 18  -LR  16  -LR    How much help from another is currently needed...    Putting on and taking off regular lower body clothing?  3  -ST     Bathing (including washing, rinsing, and drying)  3  -ST     Toileting (which includes using toilet bed pan or urinal)  3  -ST     Putting on and taking off regular upper body clothing  4  -ST     Taking care of personal grooming (such as brushing teeth)  4  -ST     Eating meals  4  -ST     Score  21  -ST     Functional Assessment    Outcome Measure Options AM-PAC 6 Clicks Basic Mobility (PT)  -LR AM-PAC 6 Clicks Daily Activity (OT)  -ST AM-PAC 6 Clicks Basic Mobility (PT)  -LR      User Key  (r) = Recorded By, (t) = Taken By, (c) = Cosigned By    Initials Name Provider Type    GRIS Haynes Occupational Therapist    LR Xochitl Veronica, PT Physical Therapist          Time Calculation:         Time Calculation- OT       12/13/17 1357          Time Calculation- OT    OT Start Time 0746  -ST      Total Timed Code Minutes- OT 15 minute(s)  -ST      OT Received On 12/13/17  -ST        User Key  (r) = Recorded By, (t) = Taken By, (c) = Cosigned By    Initials Name Provider Type    GRIS Haynes Occupational Therapist          Therapy Charges for Today     Code Description Service Date Service Provider Modifiers Qty    [id number]  OT THERAPEUTIC ACT EA 15 MIN 12/13/2017 GRIS Cannon GO 1    [id number]  OT EVAL MOD COMPLEXITY 3 12/13/2017 GRIS Cannon GO 1               OT Discharge Summary  Anticipated Discharge Disposition: home with assist, home with home health  Reason for Discharge: Discharge from facility  Outcomes Achieved: Discharge from facility occurred on same date as evluation, Refer to plan of care for updates on goals achieved  Discharge Destination: Home with home health, Home with assist    GRIS Gerard  12/13/2017

## 2017-12-13 NOTE — PROGRESS NOTES
Continued Stay Note  [facility]     Patient Name: Yang Haji  MRN: [id number]  Today's Date: 12/13/2017    Admit Date: 12/12/2017          Discharge Plan       12/13/17 1445    Case Management/Social Work Plan    Additional Comments I have called [facility] back two more times and asked that someone please call me back and let me know if they have accepted referral or not.  I spoke with Padmini.  She said she will have someone contact me ASAP.                Discharge Codes     None        Expected Discharge Date and Time     Expected Discharge Date Expected Discharge Time    Dec 13, 2017             Lillie Colon

## 2017-12-13 NOTE — PROGRESS NOTES
Continued Stay Note  [facility]     Patient Name: Yang Haji  MRN: [id number]  Today's Date: 12/13/2017    Admit Date: 12/12/2017          Discharge Plan       12/13/17 1140    Case Management/Social Work Plan    Additional Comments Spoke with Stacy at [facility] and they are unable to accept patient because they will not have a therapist until Monday.  I spoke with Padmini at [facility] and gave her patient info and faxed referral to them.  She said someone from office will call me back and let me know whether or not they are able to accept patient.        12/13/17 1059    Case Management/Social Work Plan    Plan Home with wife & home health    Additional Comments Spoke with patient and wife at bedside.  He lives with her in a ranch with basement home in [place].  She will assist him at home.  He has been independent with ADLs prior to surgery.  Denies HH.  He has a rolling walker and elevated toilet seat at home.  Denies AD/LW/POA.  Has Rx drug coverage through [employer] Medicare plan.  Plan is home with home health PT and he has chosen [facility].  I will call referral to them.  Denies any needs for additional DME.  CM will follow.                Discharge Codes     None        Expected Discharge Date and Time     Expected Discharge Date Expected Discharge Time    Dec 13, 2017             Lillie Colon

## 2017-12-13 NOTE — THERAPY DISCHARGE NOTE
Acute Care - Physical Therapy Treatment Note/Discharge  [facility]     Patient Name: Yang Haij  : 1950  MRN: [id number]  Today's Date: 2017  Onset of Illness/Injury or Date of Surgery Date: 17  Date of Referral to PT: 17  Referring Physician: MD Elvis    Admit Date: 2017    Visit Dx:    ICD-10-CM ICD-9-CM   1. Impaired functional mobility, balance, gait, and endurance Z74.09 V49.89   2. Primary osteoarthritis of right knee M17.11 715.16   3. Impaired mobility and ADLs Z74.09 799.89     Patient Active Problem List   Diagnosis   • Primary osteoarthritis of right knee   • S/P total knee arthroplasty, right   • Tobacco abuse   • Prediabetes       Physical Therapy Education     Title: PT OT SLP Therapies (Done)     Topic: Physical Therapy (Done)     Point: Mobility training (Done)    Learning Progress Summary    Learner Readiness Method Response Comment Documented by Status   Patient Acceptance E,D NR,VU Educated on correct gait mechanics, correct stair training with crutches, correct car t/f technique. Issued and reviewed written/illustrated HEP.  17 Done    Acceptance E,D VU,NR Educated on weight bearing status, precautions, correct gait mechanics, and proper ice use. LR 17 Done   Significant Other Acceptance E,D NR,VU Educated on correct gait mechanics, correct stair training with crutches, correct car t/f technique. Issued and reviewed written/illustrated HEP.  17 Done    Acceptance E,D VU,NR Educated on weight bearing status, precautions, correct gait mechanics, and proper ice use. LR 17 Done               Point: Home exercise program (Done)    Learning Progress Summary    Learner Readiness Method Response Comment Documented by Status   Patient Acceptance E,D NR,VU Educated on correct gait mechanics, correct stair training with crutches, correct car t/f technique. Issued and reviewed written/illustrated HEP.  17  Done    Acceptance E,D VU,NR Educated on weight bearing status, precautions, correct gait mechanics, and proper ice use. LR 12/12/17 1836 Done   Significant Other Acceptance E,D NR,VU Educated on correct gait mechanics, correct stair training with crutches, correct car t/f technique. Issued and reviewed written/illustrated HEP. LR 12/13/17 0920 Done    Acceptance E,D VU,NR Educated on weight bearing status, precautions, correct gait mechanics, and proper ice use. LR 12/12/17 1836 Done               Point: Body mechanics (Done)    Learning Progress Summary    Learner Readiness Method Response Comment Documented by Status   Patient Acceptance E,D NR,VU Educated on correct gait mechanics, correct stair training with crutches, correct car t/f technique. Issued and reviewed written/illustrated HEP.  12/13/17 0920 Done    Acceptance E,D VU,NR Educated on weight bearing status, precautions, correct gait mechanics, and proper ice use. LR 12/12/17 1836 Done   Significant Other Acceptance E,D NR,VU Educated on correct gait mechanics, correct stair training with crutches, correct car t/f technique. Issued and reviewed written/illustrated HEP.  12/13/17 0920 Done    Acceptance E,D VU,NR Educated on weight bearing status, precautions, correct gait mechanics, and proper ice use.  12/12/17 1836 Done               Point: Precautions (Done)    Learning Progress Summary    Learner Readiness Method Response Comment Documented by Status   Patient Acceptance E,D NR,VU Educated on correct gait mechanics, correct stair training with crutches, correct car t/f technique. Issued and reviewed written/illustrated HEP.  12/13/17 0920 Done    Acceptance E,D VU,NR Educated on weight bearing status, precautions, correct gait mechanics, and proper ice use. LR 12/12/17 1836 Done   Significant Other Acceptance E,D NR,VU Educated on correct gait mechanics, correct stair training with crutches, correct car t/f technique. Issued and reviewed  written/illustrated HEP. LR 12/13/17 0920 Done    Acceptance E,D GABRIELA,NR Educated on weight bearing status, precautions, correct gait mechanics, and proper ice use. LR 12/12/17 1836 Done                      User Key     Initials Effective Dates Name Provider Type Discipline    LR 06/19/15 -  Xochitl Veronica, PT Physical Therapist PT                    IP PT Goals       12/13/17 0820 12/12/17 1735       Bed Mobility PT LTG    Bed Mobility PT LTG, Date Established 12/12/17  -LR 12/12/17  -LR     Bed Mobility PT LTG, Time to Achieve 3 days  -LR 3 days  -LR     Bed Mobility PT LTG, Activity Type supine to sit/sit to supine  -LR supine to sit/sit to supine  -LR     Bed Mobility PT LTG, [place] Level conditional independence  -LR conditional independence  -LR     Bed Mobility PT LTG, Date Goal Reviewed 12/13/17  -LR 12/12/17  -LR     Bed Mobility PT LTG, Outcome goal not met  -LR goal ongoing  -LR     Bed Mobility PT LTG, Reason Goal Not Met discharged from facility  -LR      Transfer Training PT LTG    Transfer Training PT LTG, Date Established 12/12/17  -LR 12/12/17  -LR     Transfer Training PT LTG, Time to Achieve 3 days  -LR 3 days  -LR     Transfer Training PT LTG, Activity Type sit to stand/stand to sit  -LR sit to stand/stand to sit  -LR     Transfer Training PT LTG, [place] Level conditional independence  -LR conditional independence  -LR     Transfer Training PT LTG, Assist Device walker, rolling  -LR walker, rolling  -LR     Transfer Training PT  LTG, Date Goal Reviewed 12/13/17  -LR 12/12/17  -LR     Transfer Training PT LTG, Outcome goal not met  -LR goal ongoing  -LR     Transfer Training PT LTG, Reason Goal Not Met discharged from facility  -LR      Gait Training PT LTG    Gait Training Goal PT LTG, Date Established 12/12/17  -LR 12/12/17  -LR     Gait Training Goal PT LTG, Time to Achieve 3 days  -LR 3 days  -LR     Gait Training Goal PT LTG, [place] Level conditional independence  -LR  conditional independence  -LR     Gait Training Goal PT LTG, Assist Device walker, rolling  -LR walker, rolling  -LR     Gait Training Goal PT LTG, Distance to Achieve 500 feet  - feet  -LR     Gait Training Goal PT LTG, Date Goal Reviewed 12/13/17  -LR 12/12/17  -LR     Gait Training Goal PT LTG, Outcome goal partially met   gait distance achieved but not at level of assist  -LR goal ongoing  -LR     Gait Training Goal PT LTG, Reason Goal Not Met discharged from facility  -LR      Stair Training PT LTG    Stair Training Goal PT LTG, Date Established 12/12/17  -LR 12/12/17  -LR     Stair Training Goal PT LTG, Time to Achieve 3 days  -LR 3 days  -LR     Stair Training Goal PT LTG, Number of Steps 5  -LR 5  -LR     Stair Training Goal PT LTG, [place] Level contact guard assist  -LR contact guard assist  -LR     Stair Training Goal PT LTG, Assist Device cane, straight;other (see comments)   HHA  -LR cane, straight;other (see comments)   HHA  -LR     Stair Training Goal PT LTG, Date Goal Reviewed 12/13/17  -LR 12/12/17  -LR     Stair Training Goal PT LTG, Outcome goal not met  -LR goal ongoing  -LR     Stair Training Goal PT LTG, Reason Goal Not Met discharged from facility  -LR      Range of Motion PT LTG    Range of Motion Goal PT LTG, Date Established 12/12/17  -LR 12/12/17  -LR     Range of Motion Goal PT LTG, Time to Achieve 3 days  -LR 3 days  -LR     Range fo Motion Goal PT LTG, Joint R knee  -LR R knee  -LR     Range of Motion Goal PT LTG, AAROM Measure 0-90 degrees  -LR 0-90 degrees  -LR     Range of Motion Goal PT LTG, Date Goal Reviewed 12/13/17  -LR 12/12/17  -LR     Range of Motion Goal PT LTG, Outcome goal not met  -LR goal ongoing  -LR     Reason Goal Not Met (ROM) PT LTG discharged from facility  -LR        User Key  (r) = Recorded By, (t) = Taken By, (c) = Cosigned By    Initials Name Provider Type    LR Xochitl Veronica, PT Physical Therapist              Adult Rehabilitation Note        12/13/17 0820          Rehab Assessment/Intervention    Discipline physical therapist  -LR      Document Type therapy note (daily note);discharge summary  -LR      Subjective Information agree to therapy;complains of;pain  -LR      Patient Effort, Rehab Treatment excellent  -LR      Symptoms Noted During/After Treatment increased pain  -LR      Symptoms Noted Comment CRNA increased rate of nerve cath and delivered bolus.   -LR      Precautions/Limitations fall precautions;other (see comments)   R adductor canal nerve catheter  -LR      Recorded by [LR] Xochitl Veronica, PT      Pain Assessment    Pain Assessment 0-10  -LR      Pain Score 3  -LR      Post Pain Score 5  -LR      Pain Type Acute pain  -LR      Pain Location Knee  -LR      Pain Orientation Right;Anterior  -LR      Pain Intervention(s) Repositioned;Ambulation/increased activity  -LR      Recorded by [LR] Xochitl Veronica, PT      Cognitive Assessment/Intervention    Current Cognitive/Communication Assessment functional  -LR      Orientation Status oriented x 4;required verbal cueing (specifiy in comments)  -LR      Follows Commands/Answers Questions 100% of the time;able to follow single-step instructions;needs cueing;needs repetition  -LR      Personal Safety WNL/WFL  -LR      Recorded by [LR] Xochitl Veronica, PT      General LE Assessment    ROM Detail 11-75 degrees; lacking 11 degrees of extension AROM, 75 degrees flexion AAROM in sitting.   -LR      Recorded by [LR] Xochitl Veronica, PT      Mobility Assessment/Training    Extremity Weight-Bearing Status right lower extremity  -LR      Right Lower Extremity Weight-Bearing weight-bearing as tolerated  -LR      Recorded by [LR] Xochitl Veronica, PT      Bed Mobility, Assessment/Treatment    Bed Mob, Supine to Sit, [place] not tested   UIC on arrival.   -LR      Bed Mob, Sit to Supine, [place] not tested   UIC at end of treatment.   -LR      Recorded by [LR] Xochitl  Liliana Veronica, PT      Transfer Assessment/Treatment    Transfers, Sit-Stand [place] verbal cues required;contact guard assist  -LR      Transfers, Stand-Sit [place] verbal cues required;contact guard assist  -LR      Transfers, Sit-Stand-Sit, Assist Device rolling walker  -LR      Transfer, Safety Issues sequencing ability decreased;step length decreased;weight-shifting ability decreased  -LR      Transfer, Impairments ROM decreased;strength decreased;pain  -LR      Transfer, Comment Verbal cues to push up from chair to stand and to reach back for chair to lower into sitting. Verbal cues to step R LE out before t/f for comfort.   -LR      Recorded by [LR] Xochitl Veronica, PT      Gait Assessment/Treatment    Gait, [place] Level verbal cues required;contact guard assist  -LR      Gait, Assistive Device rolling walker  -LR      Gait, Distance (Feet) 800  -LR      Gait, Gait Pattern Analysis swing-through gait  -LR      Gait, Gait Deviations right:;antalgic;forward flexed posture;step length decreased;toe-to-floor clearance decreased;weight-shifting ability decreased  -LR      Gait, Safety Issues sequencing ability decreased;step length decreased;weight-shifting ability decreased  -LR      Gait, Impairments ROM decreased;strength decreased;pain  -LR      Gait, Comment Patient ambulated with step through gait pattern at slow pace. Cues for increased R LE weight bearing/stance phase, decreased UE weight bearing, upright posture, and increased R knee flexion during swing phase. Improved with cues for correction. Patient still having to pause when stepping forward with L LE. Cues for continuous forward motion of RW. Improved with cues for correction but still with slight pause. Gait limited by fatigue and pain.   -LR      Recorded by [LR] Xochitl Veronica, PT      Stairs Assessment/Treatment    Number of Stairs 5  -LR      Stairs, Handrail Location none  -LR      Stairs, [place] Level  verbal cues required;contact guard assist;2 person assist required  -LR      Stairs, Assistive Device axillary crutches  -LR      Stairs, Technique Used step to step (ascending);step to step (descending)  -LR      Stairs, Safety Issues sequencing ability decreased;weight-shifting ability decreased  -LR      Stairs, Impairments ROM decreased;strength decreased;pain  -LR      Stairs, Comment Verbal cues to take one step at a time and to step up with strong LE first and down with weak LE first. Cues for weight bearing on R LE as patient tended to keep it off the floor when using crutches. Verbal cues for correct placement and advancement/sequencing of crutches.   -LR      Recorded by [LR] Xochitl Veronica, PT      Therapy Exercises    Exercise Protocols total knee  -LR      Total Knee Exercises right:;15 reps;completed protocol;ankle pumps/circles;quad set;glut set;heel slide stretch;SLR;SAQ;heel slides;LAQ   cues for technique;no assist required,SLR improved with reps  -LR      Recorded by [LR] Xochitl Veronica, PT      Positioning and Restraints    Pre-Treatment Position sitting in chair/recliner  -LR      Post Treatment Position chair  -LR      In Chair notified nsg;reclined;sitting;call light within reach;encouraged to call for assist;with family/caregiver;with OT;legs elevated  -LR      Recorded by [LR] Xochitl Veronica, PT        User Key  (r) = Recorded By, (t) = Taken By, (c) = Cosigned By    Initials Name Effective Dates    LR Xochitl Veronica, PT 06/19/15 -           PT Recommendation and Plan  Anticipated Equipment Needs At Discharge: front wheeled walker  Anticipated Discharge Disposition: home with assist, home with home health  Planned Therapy Interventions: balance training, bed mobility training, gait training, home exercise program, patient/family education, ROM (Range of Motion), stair training, strengthening, transfer training  PT Frequency: 2 times/day  Plan of Care Review  Plan  Of Care Reviewed With: patient, spouse  Progress: progress toward functional goals as expected  Outcome Summary/Follow up Plan: Patient ambulated 800 feet with RW and step through gait pattern. Completed stair training with crutches and had no difficulty. ROM progressing well, 11-75 degrees, limited by pain. Patient has been d/c home with HHPT today.           Outcome Measures       12/13/17 0820 12/12/17 1735       How much help from another person do you currently need...    Turning from your back to your side while in flat bed without using bedrails? 3  -LR 3  -LR     Moving from lying on back to sitting on the side of a flat bed without bedrails? 3  -LR 3  -LR     Moving to and from a bed to a chair (including a wheelchair)? 3  -LR 3  -LR     Standing up from a chair using your arms (e.g., wheelchair, bedside chair)? 3  -LR 3  -LR     Climbing 3-5 steps with a railing? 3  -LR 1  -LR     To walk in hospital room? 3  -LR 3  -LR     AM-PAC 6 Clicks Score 18  -LR 16  -LR     Functional Assessment    Outcome Measure Options AM-PAC 6 Clicks Basic Mobility (PT)  -LR AM-PAC 6 Clicks Basic Mobility (PT)  -LR       User Key  (r) = Recorded By, (t) = Taken By, (c) = Cosigned By    Initials Name Provider Type    LR Xochitl Veronica PT Physical Therapist           Time Calculation:         PT Charges       12/13/17 0921          Time Calculation    Start Time 0820  -LR      PT Received On 12/13/17  -LR      PT Goal Re-Cert Due Date 12/22/17  -LR      Time Calculation- PT    Total Timed Code Minutes- PT 40 minute(s)  -LR        User Key  (r) = Recorded By, (t) = Taken By, (c) = Cosigned By    Initials Name Provider Type    LR Xochitl Veronica PT Physical Therapist          Therapy Charges for Today     Code Description Service Date Service Provider Modifiers Qty    92339873033 HC GAIT TRAINING EA 15 MIN 12/12/2017 Xochitl Veronica, PT GP 1    34708025852 HC PT THER SUPP EA 15 MIN 12/12/2017 Xochitl Ford  Jeremías, PT GP 4    18788524092 HC PT EVAL MOD COMPLEXITY 3 12/12/2017 Xochitl Veronica, PT GP 1    83056338265 HC GAIT TRAINING EA 15 MIN 12/13/2017 Xochitl Veronica, PT GP 2    48623065870 HC PT THER PROC EA 15 MIN 12/13/2017 Xochitl Veronica, PT GP 1          PT G-Codes  Outcome Measure Options: AM-PAC 6 Clicks Basic Mobility (PT)    PT Discharge Summary  Anticipated Discharge Disposition: home with assist, home with home health  Reason for Discharge: Discharge from facility  Outcomes Achieved: Patient able to partially acheive established goals  Discharge Destination: Home with assist, Home with home health    Xochitl Veronica, PT  12/13/2017

## 2017-12-13 NOTE — PROGRESS NOTES
[facility]    Acute pain service Inpatient Progress Note    Patient Name: Yang Haji  :  1950  MRN:  [id number]        Acute Pain  Service Inpatient Progress Note:    Analgesia:Fair  Pain Score:5/10  LOC: alert and awake  Resp Status: room air  Cardiac: VS stable  Side Effects:None  Catheter Site:clean  Cath type: peripheral nerve cath(MOOG pump)  Infusion rate: 14ml/hr  Catheter Plan:Catheter to remain Insitu and Infusion rate changed to and 14ml/hr  Comments: Patient bolused with 10ml and rate increased to 14ml/hr

## 2017-12-13 NOTE — NURSING NOTE
Acute Pain Service:  On-Q teaching completed with patient.  Video demonstration, handout and bracelet provided with [facility] on call central phone number.  Instructed to call with any questions or concerns.  Patient verbalized understanding.  Service will continue to follow until catheter DC'd.  Please contact patient at H:[phone] or Wife's Cell (Evelia) [phone] if needed.

## 2017-12-14 ENCOUNTER — TELEPHONE (OUTPATIENT)
Dept: ORTHOPEDIC SURGERY | Facility: CLINIC | Age: 67
End: 2017-12-14

## 2017-12-14 RX ORDER — HYDROCODONE BITARTRATE AND ACETAMINOPHEN 7.5; 325 MG/1; MG/1
1 TABLET ORAL EVERY 6 HOURS PRN
Qty: 30 TABLET | Refills: 0 | Status: SHIPPED | OUTPATIENT
Start: 2017-12-14 | End: 2018-03-26

## 2017-12-14 RX ORDER — ONDANSETRON 4 MG/1
4 TABLET, FILM COATED ORAL EVERY 8 HOURS PRN
Qty: 10 TABLET | Refills: 1 | Status: SHIPPED | OUTPATIENT
Start: 2017-12-14 | End: 2018-07-31

## 2017-12-14 NOTE — TELEPHONE ENCOUNTER
Patient is experiencing a lot of nausea due to his pain medication would Dr. Leal be able to order something for him?

## 2017-12-14 NOTE — PROGRESS NOTES
Continued Stay Note  [facility]     Patient Name: Yang Haji  MRN: [id number]  Today's Date: 12/14/2017    Admit Date: 12/12/2017          Discharge Plan       12/14/17 1320    Case Management/Social Work Plan    Plan Home    Patient/Family In Agreement With Plan yes    Additional Comments late entry 12/14: [facility] does not offer PT services in [facility], discussed with wife by phone, they would like to pursue [facility] even thought they cannot make a visit until Mon 12/18. I contacted [facility] [phone] and spoke with Jaimie who accepted referral for home PT, they will plan on seeing him on Mon 12/18. They will contact him by phone today.              Discharge Codes     None        Expected Discharge Date and Time     Expected Discharge Date Expected Discharge Time    Dec 13, 2017             Sonja C Kellerman, RN

## 2017-12-14 NOTE — PROGRESS NOTES
[facility]    Nerve Cath Post Op Call    Patient Name: Yang Haji  :  1950  MRN:  [id number]  Date of Discharge: 2017    Nerve Cath Post Op Call:    Catheter Plan:Patient called/No answer/Message left to call [facility] pain service for any questions or complaints

## 2017-12-14 NOTE — TELEPHONE ENCOUNTER
What pain med is he taking?  We can switch to something else and give him some Zofran.  Just let me know.

## 2017-12-14 NOTE — TELEPHONE ENCOUNTER
Dr. Leal prescribed some norco and zofran. The patient lives 3 hours away and will try to take the zofran with the percocet and if it doesn't help we will have the norco script here in the cabinet.  Roxie

## 2017-12-15 NOTE — PROGRESS NOTES
[facility]    Nerve Cath Post Op Call    Patient Name: Yang Haji  :  1950  MRN:  [id number]  Date of Discharge: 2017    Nerve Cath Post Op Call:    Analgesia:Good  Side Effects:None  Catheter Site:clean  Patient Controlled ON Q pump infusion rate: 12ml/hr  Catheter Plan:Will continue with plan at home without changes    Pt states that on q should run out tonight.

## 2017-12-17 NOTE — PROGRESS NOTES
[facility]    Nerve Cath Post Op Call    Patient Name: Yang Haji  :  1950  MRN:  [id number]  Date of Discharge: 2017    Nerve Cath Post Op Call:    Catheter Plan:Patient/Family member report nerve catheter previously discontinued, tip intact

## 2017-12-17 NOTE — PROGRESS NOTES
[facility]    Nerve Cath Post Op Call    Patient Name: Yang Haji  :  1950  MRN:  [id number]  Date of Discharge: 2017    Nerve Cath Post Op Call:    Analgesia:Good  Pain Score:4/10  Side Effects:None  Catheter Site:clean  Patient Controlled ON Q pump infusion rate: 12ml/hr  Catheter Plan:Will continue with plan at home without changes and The patient was instructed to call ON CALL Anesthesia provider for any questions or problems

## 2017-12-19 ENCOUNTER — TELEPHONE (OUTPATIENT)
Dept: ORTHOPEDIC SURGERY | Facility: CLINIC | Age: 67
End: 2017-12-19

## 2017-12-19 NOTE — TELEPHONE ENCOUNTER
I spoke with the patient's wife. She was wondering if the patient could shower and get the wound wet.  I explained to her that the Yang could shower but not to rub the wound and when it is dry to cover the wound back up to keep clean.  She was also inquiring about pain medication.  She stated that Yang will be out of pain medication on Saturday, and was wondering if there was any way to get the medication without driving up here to pick it up.  They live 3 hours from here and can't make the drive just to  a script. She was asking if Dr. Leal could talk to his PCP to get some medication.    I told her I would check into it and get back with her today.

## 2017-12-19 NOTE — TELEPHONE ENCOUNTER
PATIENTS WIFE IS CALLING TO INQUIRE ABOUT PERCOCET REFILL. ALSO SHE HAS QUESTIONS REGARDING THE DRESSING ON HER HUSBANDS LEG. [phone]

## 2017-12-19 NOTE — TELEPHONE ENCOUNTER
I called and spoke with Mr. Haji and informed him we have the script here if they are able to pick it up.  If not he can call his PCP and explain that he has just had surgery and see if he can give him medicine.

## 2017-12-20 ENCOUNTER — TELEPHONE (OUTPATIENT)
Dept: ORTHOPEDIC SURGERY | Facility: CLINIC | Age: 67
End: 2017-12-20

## 2017-12-20 NOTE — TELEPHONE ENCOUNTER
Patient's daughter is calling on behalf of patient for some more pain medication & would like to  today.

## 2017-12-20 NOTE — TELEPHONE ENCOUNTER
Dr. Leal has written a prescription and they can pick it up at their convenience. Charity spoke with daughter of patient.

## 2018-01-03 ENCOUNTER — OFFICE VISIT (OUTPATIENT)
Dept: ORTHOPEDIC SURGERY | Facility: CLINIC | Age: 68
End: 2018-01-03

## 2018-01-03 DIAGNOSIS — Z96.651 STATUS POST TOTAL RIGHT KNEE REPLACEMENT: Primary | ICD-10-CM

## 2018-01-03 DIAGNOSIS — Z47.89 ORTHOPEDIC AFTERCARE: ICD-10-CM

## 2018-01-03 PROCEDURE — 99024 POSTOP FOLLOW-UP VISIT: CPT | Performed by: ORTHOPAEDIC SURGERY

## 2018-01-03 RX ORDER — OXYCODONE HYDROCHLORIDE AND ACETAMINOPHEN 5; 325 MG/1; MG/1
1 TABLET ORAL EVERY 6 HOURS PRN
Qty: 20 TABLET | Refills: 0 | Status: SHIPPED | OUTPATIENT
Start: 2018-01-03 | End: 2018-07-31

## 2018-01-03 RX ORDER — OXYCODONE HYDROCHLORIDE AND ACETAMINOPHEN 5; 325 MG/1; MG/1
TABLET ORAL
COMMUNITY
Start: 2017-12-28 | End: 2018-07-31

## 2018-01-03 NOTE — PROGRESS NOTES
[facility] [place] [place]    Subjective     Chief Complaint   Patient presents with   • Post-op     3 weeks status post: Right Total Knee Arthroplasty 12/12/2017        HPI    Yang Haji is a 67 y.o. male. He follows up today status post right total knee arthroplasty.  He is doing well today.  He is making progress.  65-70% improvement compared to his preoperative symptoms.      Patient Active Problem List   Diagnosis   • Primary osteoarthritis of right knee   • S/P total knee arthroplasty, right   • Tobacco abuse   • Prediabetes     Past Medical History:   Diagnosis Date   • Adult BMI 28.0-28.9 kg/sq m    • Back pain    • Chronic pain of right knee    • PONV (postoperative nausea and vomiting)    • Primary osteoarthritis of right knee    • Staph infection 1974    right knee, treated with antibiotics    • Wears glasses       Past Surgical History:   Procedure Laterality Date   • COLONOSCOPY  10/2017   • KNEE SURGERY Right 1974    debridement d/t staph infection   • LASIK Bilateral    • MEDIAL COLLATERAL LIGAMENT REPAIR, KNEE Right 1974   • RHIZOTOMY     • TOTAL KNEE ARTHROPLASTY Right 12/12/2017    Procedure: TOTAL KNEE ARTHROPLASTY RIGHT;  Surgeon: Nazario Leal MD;  Location: [facility];  Service:       Family History   Problem Relation Age of Onset   • Diabetes Father    • Heart disease Father    • Hypertension Father    • Osteoarthritis Father    • Stroke Father    • Heart attack Father    • Cancer Mother      Social History     Social History   • Marital status:      Spouse name: N/A   • Number of children: N/A   • Years of education: N/A     Occupational History   • Not on file.     Social History Main Topics   • Smoking status: Never Smoker   • Smokeless tobacco: Current User     Types: Chew      Comment: Less than 1/2 can of smokeless tobacco per week   • Alcohol use 1.2 oz/week     2 Glasses of wine per week      Comment: occasional   • Drug use: No   • Sexual activity: Defer      Other Topics Concern   • Not on file     Social History Narrative      Current Outpatient Prescriptions on File Prior to Visit   Medication Sig Dispense Refill   • aspirin 81 MG EC tablet Take 1 tablet by mouth Daily. Resume in 1 month     • aspirin 325 MG EC tablet Take 1 tablet by mouth Daily for 1 month 30 tablet 0   • docusate sodium 100 MG capsule Take 1 capsule by mouth 2 (Two) Times a Day As Needed for Constipation. 60 capsule 0   • HYDROcodone-acetaminophen (NORCO) 7.5-325 MG per tablet Take 1 tablet by mouth Every 6 (Six) Hours As Needed for Moderate Pain . 30 tablet 0   • melatonin 5 MG tablet tablet Take 10 mg by mouth Every Night.     • naproxen sodium (ALEVE) 220 MG tablet Take 2 tablets by mouth 2 (Two) Times a Day As Needed for Mild Pain . Must take an hour after aspirin if needed.     • ondansetron (ZOFRAN) 4 MG tablet Take 1 tablet by mouth Every 8 (Eight) Hours As Needed for Nausea. 10 tablet 1   • Ropivacaine HCl-NaCl (NAROPIN) 0.2-0.9 % 24 mg/hr by Peripheral Nerve route Continuous.       No current facility-administered medications on file prior to visit.       No Known Allergies     Review of Systems   Constitutional: Negative for activity change, appetite change, chills, diaphoresis, fatigue, fever and unexpected weight change.   HENT: Negative for congestion, dental problem, drooling, ear discharge, ear pain, facial swelling, hearing loss, mouth sores, nosebleeds, postnasal drip, rhinorrhea, sinus pressure, sneezing, sore throat, tinnitus, trouble swallowing and voice change.    Eyes: Negative for photophobia, pain, discharge, redness, itching and visual disturbance.   Respiratory: Negative for apnea, cough, choking, chest tightness, shortness of breath, wheezing and stridor.    Cardiovascular: Negative for chest pain, palpitations and leg swelling.   Gastrointestinal: Negative for abdominal distention, abdominal pain, anal bleeding, blood in stool, constipation, diarrhea, nausea, rectal  pain and vomiting.   Endocrine: Negative for cold intolerance, heat intolerance, polydipsia, polyphagia and polyuria.   Genitourinary: Negative for decreased urine volume, difficulty urinating, dysuria, enuresis, flank pain, frequency, genital sores, hematuria and urgency.   Musculoskeletal: Positive for arthralgias. Negative for back pain, gait problem, joint swelling, myalgias, neck pain and neck stiffness.   Skin: Negative for color change, pallor, rash and wound.   Allergic/Immunologic: Negative for environmental allergies, food allergies and immunocompromised state.   Neurological: Negative for dizziness, tremors, seizures, syncope, facial asymmetry, speech difficulty, weakness, light-headedness, numbness and headaches.   Hematological: Negative for adenopathy. Does not bruise/bleed easily.   Psychiatric/Behavioral: Negative for agitation, behavioral problems, confusion, decreased concentration, dysphoric mood, hallucinations, self-injury, sleep disturbance and suicidal ideas. The patient is not nervous/anxious and is not hyperactive.         Objective      Physical Exam  There were no vitals taken for this visit.    There is no height or weight on file to calculate BMI.    General:   Mental Status:  Alert   Appearance: Cooperative, in no acute distress   Build and Nutrition: Well-nourished and well developed male   Orientation: Alert and oriented to person, place and time   Posture: Normal   Gait: Antalgic on the right    Integument:   Right knee: Wound is well-healed with no signs of infection    Lower Extremities:   Right Knee:    Tenderness:  None    Effusion:  None    Swelling: None    Crepitus:  None    Range of motion:  Extension: 5°       Flexion: 100°  Instability:  No varus laxity, no valgus laxity, negative anterior drawer  Deformities:  None      Imaging/Studies  Imaging Results (last 24 hours)     Procedure Component Value Units Date/Time    XR Knee 3+ View With [place] Right [[id number]] Resulted:   01/03/18 1339     Updated:  01/03/18 1340    Narrative:       Right Knee Radiographs  Indication: status-post right total knee arthroplasty  Views: AP, lateral, and sunrise views of the right knee    Comparison: no change compared to prior study    Findings:   The components are well aligned, with no signs of loosening or failure.            Assessment and Plan     Yang was seen today for post-op.    Diagnoses and all orders for this visit:    Status post total right knee replacement  -     XR Knee 3+ View With [place] Right  -     Ambulatory Referral to Physical Therapy Evaluate and treat  -     oxyCODONE-acetaminophen (PERCOCET) 5-325 MG per tablet; Take 1 tablet by mouth Every 6 (Six) Hours As Needed for Moderate Pain .    Orthopedic aftercare        I reviewed my findings with patient today.  He's doing well following his right total knee arthroplasty.  I will see him back in 6 weeks for recheck, but sooner for problems.  He will start outpatient therapy.    Return in about 6 weeks (around 2/14/2018) for Recheck without X-Rays.        Nazario Leal MD  01/03/18  2:02 PM

## 2018-02-14 ENCOUNTER — OFFICE VISIT (OUTPATIENT)
Dept: ORTHOPEDIC SURGERY | Facility: CLINIC | Age: 68
End: 2018-02-14

## 2018-02-14 DIAGNOSIS — Z47.89 ORTHOPEDIC AFTERCARE: ICD-10-CM

## 2018-02-14 DIAGNOSIS — Z96.651 STATUS POST TOTAL RIGHT KNEE REPLACEMENT: Primary | ICD-10-CM

## 2018-02-14 PROCEDURE — 99024 POSTOP FOLLOW-UP VISIT: CPT | Performed by: ORTHOPAEDIC SURGERY

## 2018-02-14 NOTE — PROGRESS NOTES
[facility] [place] [place] [facility] [place]    Subjective     Chief Complaint   Patient presents with   • Post-op     9 weeks s/p (R) Total Knee Arthroplasty 12/12/17        HPI    Yang Haji is a 67 y.o. male. He follows up today status post right total knee arthroplasty.  He is doing well today.  85-90% improvement compared to his preoperative symptoms.  Fully ambulatory without external aids.      Patient Active Problem List   Diagnosis   • Primary osteoarthritis of right knee   • S/P total knee arthroplasty, right   • Tobacco abuse   • Prediabetes     Past Medical History:   Diagnosis Date   • Adult BMI 28.0-28.9 kg/sq m    • Back pain    • Chronic pain of right knee    • PONV (postoperative nausea and vomiting)    • Primary osteoarthritis of right knee    • Staph infection 1974    right knee, treated with antibiotics    • Wears glasses       Past Surgical History:   Procedure Laterality Date   • COLONOSCOPY  10/2017   • KNEE SURGERY Right 1974    debridement d/t staph infection   • LASIK Bilateral    • MEDIAL COLLATERAL LIGAMENT REPAIR, KNEE Right 1974   • RHIZOTOMY     • TOTAL KNEE ARTHROPLASTY Right 12/12/2017    Procedure: TOTAL KNEE ARTHROPLASTY RIGHT;  Surgeon: Nazario Leal MD;  Location: [facility];  Service:       Family History   Problem Relation Age of Onset   • Diabetes Father    • Heart disease Father    • Hypertension Father    • Osteoarthritis Father    • Stroke Father    • Heart attack Father    • Cancer Mother      Social History     Social History   • Marital status:      Spouse name: N/A   • Number of children: N/A   • Years of education: N/A     Occupational History   • Not on file.     Social History Main Topics   • Smoking status: Never Smoker   • Smokeless tobacco: Current User     Types: Chew      Comment: Less than 1/2 can of smokeless tobacco per week   • Alcohol use 1.2 oz/week     2 Glasses of wine per week      Comment: occasional   • Drug use: No   • Sexual activity:  Defer     Other Topics Concern   • Not on file     Social History Narrative      Current Outpatient Prescriptions on File Prior to Visit   Medication Sig Dispense Refill   • aspirin 325 MG EC tablet Take 1 tablet by mouth Daily for 1 month 30 tablet 0   • aspirin 81 MG EC tablet Take 1 tablet by mouth Daily. Resume in 1 month     • docusate sodium 100 MG capsule Take 1 capsule by mouth 2 (Two) Times a Day As Needed for Constipation. 60 capsule 0   • HYDROcodone-acetaminophen (NORCO) 7.5-325 MG per tablet Take 1 tablet by mouth Every 6 (Six) Hours As Needed for Moderate Pain . 30 tablet 0   • melatonin 5 MG tablet tablet Take 10 mg by mouth Every Night.     • naproxen sodium (ALEVE) 220 MG tablet Take 2 tablets by mouth 2 (Two) Times a Day As Needed for Mild Pain . Must take an hour after aspirin if needed.     • ondansetron (ZOFRAN) 4 MG tablet Take 1 tablet by mouth Every 8 (Eight) Hours As Needed for Nausea. 10 tablet 1   • oxyCODONE-acetaminophen (PERCOCET) 5-325 MG per tablet      • oxyCODONE-acetaminophen (PERCOCET) 5-325 MG per tablet Take 1 tablet by mouth Every 6 (Six) Hours As Needed for Moderate Pain . 20 tablet 0   • Ropivacaine HCl-NaCl (NAROPIN) 0.2-0.9 % 24 mg/hr by Peripheral Nerve route Continuous.       No current facility-administered medications on file prior to visit.       No Known Allergies     Review of Systems   Constitutional: Negative for activity change, appetite change, chills, diaphoresis, fatigue, fever and unexpected weight change.   HENT: Negative for congestion, dental problem, drooling, ear discharge, ear pain, facial swelling, hearing loss, mouth sores, nosebleeds, postnasal drip, rhinorrhea, sinus pressure, sneezing, sore throat, tinnitus, trouble swallowing and voice change.    Eyes: Negative for photophobia, pain, discharge, redness, itching and visual disturbance.   Respiratory: Negative for apnea, cough, choking, chest tightness, shortness of breath, wheezing and stridor.     Cardiovascular: Negative for chest pain, palpitations and leg swelling.   Gastrointestinal: Negative for abdominal distention, abdominal pain, anal bleeding, blood in stool, constipation, diarrhea, nausea, rectal pain and vomiting.   Endocrine: Negative for cold intolerance, heat intolerance, polydipsia, polyphagia and polyuria.   Genitourinary: Negative for decreased urine volume, difficulty urinating, dysuria, enuresis, flank pain, frequency, genital sores, hematuria and urgency.   Musculoskeletal: Positive for back pain, joint swelling and neck stiffness. Negative for arthralgias, gait problem, myalgias and neck pain.   Skin: Negative for color change, pallor, rash and wound.   Allergic/Immunologic: Negative for environmental allergies, food allergies and immunocompromised state.   Neurological: Negative for dizziness, tremors, seizures, syncope, facial asymmetry, speech difficulty, weakness, light-headedness, numbness and headaches.   Hematological: Negative for adenopathy. Does not bruise/bleed easily.   Psychiatric/Behavioral: Negative for agitation, behavioral problems, confusion, decreased concentration, dysphoric mood, hallucinations, self-injury, sleep disturbance and suicidal ideas. The patient is not nervous/anxious and is not hyperactive.         Objective      Physical Exam  There were no vitals taken for this visit.    There is no height or weight on file to calculate BMI.    General:   Mental Status:  Alert   Appearance: Cooperative, in no acute distress   Build and Nutrition: Well-nourished and well developed male   Orientation: Alert and oriented to person, place and time   Posture: Normal   Gait: Normal    Integument:   Right knee: Wound is well-healed with no signs of infection    Lower Extremities:   Right Knee:    Tenderness:  None    Effusion:  None    Swelling: None    Crepitus:  None    Range of motion:  Extension: 0°       Flexion: 115°  Instability:  No varus laxity, no valgus laxity,  negative anterior drawer  Deformities:  None        Assessment and Plan     Yang was seen today for post-op.    Diagnoses and all orders for this visit:    Status post total right knee replacement    Orthopedic aftercare        I reviewed my findings with patient today.  His right total knee arthroplasty is functioning well.  I will see him back in 4 months with an x-ray, but sooner for any problems.    Return in about 4 months (around 6/14/2018) for Recheck with X-Rays.        Nazario Leal MD  02/14/18  9:25 AM

## 2018-06-25 ENCOUNTER — OFFICE VISIT (OUTPATIENT)
Dept: ORTHOPEDIC SURGERY | Facility: CLINIC | Age: 68
End: 2018-06-25

## 2018-06-25 VITALS — OXYGEN SATURATION: 99 % | HEIGHT: 73 IN | WEIGHT: 199.08 LBS | BODY MASS INDEX: 26.38 KG/M2 | HEART RATE: 46 BPM

## 2018-06-25 DIAGNOSIS — Z96.651 STATUS POST RIGHT KNEE REPLACEMENT: Primary | ICD-10-CM

## 2018-06-25 DIAGNOSIS — Z09 POSTOPERATIVE EXAMINATION: ICD-10-CM

## 2018-06-25 PROCEDURE — 99213 OFFICE O/P EST LOW 20 MIN: CPT | Performed by: ORTHOPAEDIC SURGERY

## 2018-06-25 NOTE — PROGRESS NOTES
[facility] Note    Subjective     Chief Complaint   Patient presents with   • Right Knee - Follow-up     4 month follow up -- (R) Total Knee Arthroplasty 12/12/17        HPI    Yang Haji is a 67 y.o. male. He follows up today for his right total knee arthroplasty.  He is doing well today.  No complaints.  95% improvement compared to his preoperative symptoms.  Fully ambulatory without external aids.      Patient Active Problem List   Diagnosis   • Primary osteoarthritis of right knee   • S/P total knee arthroplasty, right   • Tobacco abuse   • Prediabetes     Past Medical History:   Diagnosis Date   • Adult BMI 28.0-28.9 kg/sq m    • Back pain    • Chronic pain of right knee    • PONV (postoperative nausea and vomiting)    • Primary osteoarthritis of right knee    • Staph infection 1974    right knee, treated with antibiotics    • Wears glasses       Past Surgical History:   Procedure Laterality Date   • COLONOSCOPY  10/2017   • KNEE SURGERY Right 1974    debridement d/t staph infection   • LASIK Bilateral    • MEDIAL COLLATERAL LIGAMENT REPAIR, KNEE Right 1974   • RHIZOTOMY     • TOTAL KNEE ARTHROPLASTY Right 12/12/2017    Procedure: TOTAL KNEE ARTHROPLASTY RIGHT;  Surgeon: Nazario Leal MD;  [facility]  Service:       Family History   Problem Relation Age of Onset   • Diabetes Father    • Heart disease Father    • Hypertension Father    • Osteoarthritis Father    • Stroke Father    • Heart attack Father    • Cancer Mother      Social History     Social History   • Marital status:      Spouse name: N/A   • Number of children: N/A   • Years of education: N/A     Occupational History   • Not on file.     Social History Main Topics   • Smoking status: Never Smoker   • Smokeless tobacco: Current User     Types: Chew      Comment: Less than 1/2 can of smokeless tobacco per week   • Alcohol use 1.2 oz/week     2 Glasses of wine per week      Comment: occasional   • Drug  use: No   • Sexual activity: Defer     Other Topics Concern   • Not on file     Social History Narrative   • No narrative on file      Current Outpatient Prescriptions on File Prior to Visit   Medication Sig Dispense Refill   • aspirin 81 MG EC tablet Take 1 tablet by mouth Daily. Resume in 1 month     • clarithromycin XL (BIAXIN XL) 500 MG 24 hr tablet      • docusate sodium 100 MG capsule Take 1 capsule by mouth 2 (Two) Times a Day As Needed for Constipation. 60 capsule 0   • melatonin 5 MG tablet tablet Take 10 mg by mouth Every Night.     • naproxen sodium (ALEVE) 220 MG tablet Take 2 tablets by mouth 2 (Two) Times a Day As Needed for Mild Pain . Must take an hour after aspirin if needed.     • ondansetron (ZOFRAN) 4 MG tablet Take 1 tablet by mouth Every 8 (Eight) Hours As Needed for Nausea. 10 tablet 1   • oxyCODONE-acetaminophen (PERCOCET) 5-325 MG per tablet      • oxyCODONE-acetaminophen (PERCOCET) 5-325 MG per tablet Take 1 tablet by mouth Every 6 (Six) Hours As Needed for Moderate Pain . 20 tablet 0   • aspirin 325 MG EC tablet Take 1 tablet by mouth Daily for 1 month 30 tablet 0   • Ropivacaine HCl-NaCl (NAROPIN) 0.2-0.9 % 24 mg/hr by Peripheral Nerve route Continuous.       No current facility-administered medications on file prior to visit.       No Known Allergies     Review of Systems   Constitutional: Negative for activity change, appetite change, chills, diaphoresis, fatigue, fever and unexpected weight change.   HENT: Negative for congestion, dental problem, drooling, ear discharge, ear pain, facial swelling, hearing loss, mouth sores, nosebleeds, postnasal drip, rhinorrhea, sinus pressure, sneezing, sore throat, tinnitus, trouble swallowing and voice change.    Eyes: Negative for photophobia, pain, discharge, redness, itching and visual disturbance.   Respiratory: Negative for apnea, cough, choking, chest tightness, shortness of breath, wheezing and stridor.    Cardiovascular: Negative for chest  "pain, palpitations and leg swelling.   Gastrointestinal: Negative for abdominal distention, abdominal pain, anal bleeding, blood in stool, constipation, diarrhea, nausea, rectal pain and vomiting.   Endocrine: Negative for cold intolerance, heat intolerance, polydipsia, polyphagia and polyuria.   Genitourinary: Negative for decreased urine volume, difficulty urinating, dysuria, enuresis, flank pain, frequency, genital sores, hematuria and urgency.   Musculoskeletal: Positive for arthralgias. Negative for back pain, gait problem, joint swelling, myalgias, neck pain and neck stiffness.   Skin: Negative for color change, pallor, rash and wound.   Allergic/Immunologic: Negative for environmental allergies, food allergies and immunocompromised state.   Neurological: Negative for dizziness, tremors, seizures, syncope, facial asymmetry, speech difficulty, weakness, light-headedness, numbness and headaches.   Hematological: Negative for adenopathy. Does not bruise/bleed easily.   Psychiatric/Behavioral: Negative for agitation, behavioral problems, confusion, decreased concentration, dysphoric mood, hallucinations, self-injury, sleep disturbance and suicidal ideas. The patient is not nervous/anxious and is not hyperactive.         Objective      Physical Exam  Pulse (!) 46   Ht 185.4 cm (72.99\")   Wt 90.3 kg (199 lb 1.2 oz)   SpO2 99%   BMI 26.27 kg/m²     Body mass index is 26.27 kg/m².    General:   Mental Status:  Alert   Appearance: Cooperative, in no acute distress   Build and Nutrition: Well-nourished and well developed male   Orientation: Alert and oriented to person, place and time   Posture: Normal   Gait: Normal    Integument:   Right knee: Wound is well-healed with no signs of infection    Lower Extremities:   Right Knee:    Tenderness:  None    Effusion:  None    Swelling: None    Crepitus:  None    Range of motion:  Extension: 0°       Flexion: 130°  Instability:  No varus laxity, no valgus laxity, negative " anterior drawer  Deformities:  None    Imaging/Studies  Imaging Results (last 24 hours)     Procedure Component Value Units Date/Time    XR Knee 3+ View With [place] Right [[id number]] Resulted:  06/25/18 0924     Updated:  06/25/18 0925    Narrative:       Right Knee Radiographs  Indication: status-post right total knee arthroplasty  Views: AP, lateral, and sunrise views of the right knee    Comparison: no change compared to prior study, 1/3/2018     Findings:   The components are well aligned, with no signs of loosening or failure.            Assessment and Plan     Yang was seen today for follow-up.    Diagnoses and all orders for this visit:    Status post right knee replacement  -     XR Knee 3+ View With [place] Right    Postoperative examination        I reviewed my findings with patient today.  His right total knee arthroplasty is functioning well, and I will see him back in 6 months with a repeat x-ray.  I will see him back sooner for any problems.    Return in about 6 months (around 12/25/2018) for Recheck with X-Rays.      Medical Decision Making    Data/Risk: radiology tests and independent visualization of imaging, lab tests, or EMG/NCV      Naazrio Leal MD  06/25/18  9:39 AM

## 2018-07-26 ENCOUNTER — TELEPHONE (OUTPATIENT)
Dept: PAIN MEDICINE | Facility: CLINIC | Age: 68
End: 2018-07-26

## 2018-07-26 NOTE — TELEPHONE ENCOUNTER
[facility] Report #[id number]     MRI Brain, 01/08/2016: 1. Cervicomedullary junction and foramen magnum are clear. The pituitary  gland and optic chiasm are unremarkable. Suprasellar cistern is clear.  2. There is an air-fluid level in the left maxillary sinus with  low-grade ethmoid sinusitis. The mastoids are clear.  3. The flow-voids are unremarkable. The ventricular system is normal.  4. The FLAIR data sets are markedly abnormal with diffuse areas of  increased FLAIR signal throughout the white matter extending from the  subcortical white matter to the deep periventricular white matter and  this is extensive related or severe white matter disease, likely  microangiopathy.  5. The T1 data sets are negative for hemorrhage or extracerebral  collection.  The diffusion weighted sequence is negative for acute restricted  diffusion or acute ischemic insult. ADC mapping exam is unremarkable.       Xray Lumbar Spine, 12/08/2015: In both the flexed and extended position there is  approximately 15.6 to 15.8 cm anterior subluxation at L5-S1. Otherwise  there are bony degenerative changes. There is no compression fracture.     Xray Lumbar Spine, 12/08/2015: There is again Grade II spondylolisthesis at L5-S1, which appears approximately stable from the previous study and shows no  obvious movement from flexion to extension. No new abnormality of  alignment is seen. There is mild to moderate degenerative disc space  narrowing at the remaining levels, with relative sparing of L4-5. No  compression deformity or other new bony abnormality is seen. The AP view  shows a mild levoconvex scoliosis. The bony pelvis appears grossly  Intact.    Bone Scan, 01/22/2014: Normal bone mineral density in both hips and in the lumbar  spine.     Xray Lumbar Spine, 01/20/2014: There is a Grade II spondylolisthesis at L5-S1. The alignment is not  significantly changed in the flexed or extended position. There are bony  degenerative changes particularly  at L3-L4.

## 2018-07-27 PROBLEM — M79.18 MYOFASCIAL PAIN: Status: ACTIVE | Noted: 2018-07-27

## 2018-07-27 PROBLEM — M43.10 PARS DEFECT WITH SPONDYLOLISTHESIS: Status: ACTIVE | Noted: 2018-07-27

## 2018-07-27 PROBLEM — M43.17 SPONDYLOLISTHESIS OF LUMBOSACRAL REGION: Status: ACTIVE | Noted: 2018-07-27

## 2018-07-27 PROBLEM — M47.816 SPONDYLOSIS OF LUMBAR REGION WITHOUT MYELOPATHY OR RADICULOPATHY: Status: ACTIVE | Noted: 2018-07-27

## 2018-07-27 PROBLEM — M43.00 PARS DEFECT WITH SPONDYLOLISTHESIS: Status: ACTIVE | Noted: 2018-07-27

## 2018-07-27 NOTE — PROGRESS NOTES
"Chief Complaint: \"Pain in my lower back, left hip and left leg.\"     History of Present Illness:   Patient: Mr. Yang Haji, 67 y.o. male   Reason for referral: Consultation for intractable chronic lower and neck pain.     PROBLEM #1: Lower Back and Left Lower Extremity  Pain history: Patient reports a 6-month history of pain, which began without incident. Patient was last seen more than three years ago, in February 2015, when he underwent bilateral lumbar medial branch rhizotomies at L2, L3, L4, L5; for bilateral lumbar facet joints at L3-L4, L4-L5, and L5-S1 combined with lumbar epidural steroid injection; L5-S1 interlaminar approach. Pain has recurred over the past 6 months.   Pain description: constant pain with intermittent exacerbation, described as burning, sharp and tingling sensation.   Radiation of pain: The lower back pain radiates into the posterior aspect of the left hip, posterior aspect of the left thigh, left lateral calf, and dorsal aspect of the left foot. The hip pain is more severe than the lower back pain.  Pain intensity today: 7/10  Average pain intensity last week: 7/10  Pain intensity ranges from: 7/10 to 10/10  Aggravating factors: Pain increases with standing longer than 2-5 minutes and walking more than 50 yards.  Alleviating factors: Pain decreases with sitting down and lying down.     Associated symptoms:   Patient reports pain, tingling and weakness, in the right lower extremity and numbness in his left foot. Patient denies right-sided symptoms  Patient denies any new bladder or bowel problems.   Patient reports difficulties with his balance but denies recent falls.      PROBLEM #2: Neck Pain  Pain history: Patient reports a 40-year history of neck pain, which began after sport related injuries.  Pain description: constant pain with intermittent exacerbation, described as a sharp sensation.   Radiation of pain: None  Pain intensity today: 3/10 (at rest) 7/10 (with rotation of the " cervical spine)  Average pain intensity last week: 7/10  Pain intensity ranges from: 3/10 to 10/10  Aggravating factors: Pain increases with rotation of the cervical spine  Alleviating factors: Pain decreases with rest     Associated symptoms:   Patient denies pain, tingling numbness, or weakness, in upper upper extremities   Patient denies headaches    Review of previous therapies and additional medical records:  Yang Haji has already failed the following measures, including:   Conservative measures: physical therapy, ice, heat, supervised exercise program and TENs   Interventional measures: Patient was last seen more than three years ago, in February 2015, when he underwent bilateral lumbar medial branch rhizotomies at L2, L3, L4, L5; for bilateral lumbar facet joints at L3-L4, L4-L5, and L5-S1 combined with lumbar epidural steroid injection; L5-S1 interlaminar approach. Pain has recurred over the past 6 months.   Surgical measures: No history of lumbar spine surgery  Yang Haji underwent neurosurgical consultation with Dr. Juan Gonzalez a few years back and was found to be a surgical candidate.  Yang Haji presents with significant comorbidities including osteoarthritis, chronic pain engaged in treatment.  In terms of current analgesics, Yang Haji takes: Motrin, Aleve  I have reviewed [facility] Report #[id number] consistent to medication reconciliation.     Global Pain Scale  07-31-18                 Pain  21                 Feelings  12                 Clinical outcomes  18                 Activities  10                 GPS Total:  61                    Review of Diagnostic Studies:     MRI Brain, 01/08/2016: Cervicomedullary junction and foramen magnum are clear. The pituitary gland and optic chiasm are unremarkable. Suprasellar cistern is clear. There is an air-fluid level in the left maxillary sinus with low-grade ethmoid sinusitis. The mastoids are clear. The flow-voids are unremarkable.  The ventricular system is normal.The FLAIR data sets are markedly abnormal with diffuse areas of increased FLAIR signal throughout the white matter extending from the subcortical white matter to the deep periventricular white matter and this is extensive related or severe white matter disease, likely Microangiopathy. The T1 data sets are negative for hemorrhage or extracerebral collection. The diffusion weighted sequence is negative for acute restricted diffusion or acute ischemic insult. ADC mapping exam is unremarkable.     Xray Lumbar Spine, 12/08/2015: In both the flexed and extended position there is approximately 15.6 to 15.8 cm anterior subluxation at L5-S1. Otherwise there are bony degenerative changes. There is no compression fracture.   Xray Lumbar Spine, 12/08/2015: There is again Grade II spondylolisthesis at L5-S1, which appears approximately stable from the previous study and shows no obvious movement from flexion to extension. No new abnormality of alignment is seen. There is mild to moderate degenerative disc space narrowing at the remaining levels, with relative sparing of L4-5. No compression deformity or other new bony abnormality is seen. The AP view shows a mild levoconvex scoliosis. The bony pelvis appears grossly Intact.  Bone Scan, 01/22/2014: Normal bone mineral density in both hips and in the lumbar spine.   Xray Lumbar Spine, 01/20/2014: There is a Grade II spondylolisthesis at L5-S1. The alignment is not significantly changed in the flexed or extended position. There are bony degenerative changes particularly at L3-L4.  MRI of the lumbar spine without contrast March 25, 2011 revealed grade 1 spondylolisthesis of L5 on S1 with associated diffuse disc bulge, facet hypertrophy and bilateral pars defects.  Bilateral severe neuroforaminal stenosis.  Diffuse disc bulges L3-L4, L4-L5, and L5-S1 with bilateral facet hypertrophy.  Moderate to severe bilateral neuroforaminal stenosis most evident  at left L4-L5 and left L5-S1    Review of Systems   Genitourinary: Positive for urgency.   Musculoskeletal: Positive for arthralgias, back pain, gait problem, myalgias, neck pain and neck stiffness.   Neurological: Positive for numbness.   All other systems reviewed and are negative.        Patient Active Problem List   Diagnosis   • Primary osteoarthritis of right knee   • S/P total knee arthroplasty, right   • Tobacco abuse   • Prediabetes   • Spondylolisthesis of lumbosacral region   • Spondylosis of lumbar region without myelopathy or radiculopathy   • Pars defect with spondylolisthesis   • Myofascial pain   • Cervical spondylosis without myelopathy       Past Medical History:   Diagnosis Date   • Adult BMI 28.0-28.9 kg/sq m    • Back pain    • Chronic pain of right knee    • PONV (postoperative nausea and vomiting)    • Primary osteoarthritis of right knee    • Staph infection 1974    right knee, treated with antibiotics    • Wears glasses          Past Surgical History:   Procedure Laterality Date   • COLONOSCOPY  10/2017   • KNEE SURGERY Right 1974    debridement d/t staph infection   • LASIK Bilateral    • MEDIAL COLLATERAL LIGAMENT REPAIR, KNEE Right 1974   • RHIZOTOMY     • TOTAL KNEE ARTHROPLASTY Right 12/12/2017    Procedure: TOTAL KNEE ARTHROPLASTY RIGHT;  Surgeon: Nazario Leal MD;  Location: [facility];  Service:          Family History   Problem Relation Age of Onset   • Diabetes Father    • Heart disease Father    • Hypertension Father    • Osteoarthritis Father    • Stroke Father    • Heart attack Father    • Cancer Mother          Social History     Social History   • Marital status:      Social History Main Topics   • Smoking status: Never Smoker   • Smokeless tobacco: Current User     Types: Chew      Comment: Less than 1/2 can of smokeless tobacco per week   • Drug use: No   • Sexual activity: Defer     Other Topics Concern   • Not on file        Current Outpatient Prescriptions:   •   "aspirin 81 MG EC tablet, Take 1 tablet by mouth Daily. Resume in 1 month, Disp: , Rfl:       No Known Allergies      /80   Pulse 59   Temp 97.1 °F (36.2 °C) (Temporal Artery )   Resp 18   Ht 185.4 cm (73\")   Wt 91.5 kg (201 lb 12.8 oz)   SpO2 99%   BMI 26.62 kg/m²       Physical Exam:  Constitutional: Patient is oriented to person, place, and time. Vital signs are normal. Patient appears well-developed and well-nourished.   HEENT: Head: Normocephalic and atraumatic. Eyes: Conjunctivae and lids are normal. Pupils: Equal, round, reactive to light.   Neck: Trachea normal. Neck supple. No JVD present.   Lymphatic: No cervical adenopathy  Pulmonary Respiratory effort: No increased work of breathing or signs of respiratory distress. Auscultation of lungs: Clear to auscultation.   Cardiovascular Auscultation of heart: Normal rate and rhythm, normal S1 and S2, no murmurs. Peripheral vascular exam: Normal.   Abdomen: The abdomen was soft and nontender. Bowel sounds were normal.   Musculoskeletal   Gait and station: Gait evaluation demonstrated a normal gait   Cervical spine: Passive and active range of motion is limited secondary to pain. Extension, lateral flexion, rotation of the cervical spine increased and reproduced pain. Cervical facet joint loading maneuvers are positive.  Muscles: Presence of active trigger points at the levator scapulae and trapezius   Shoulders: The range of motion of the glenohumeral joints is full and without pain. Rotator cuff strength is 5/5.   Lumbar spine: The range of motion of the lumbar spine is limited secondary to pain. Extension, lateral flexion, rotation of the lumbar spine increased and reproduced pain. Lumbar facet joint loading maneuvers are positive.  Puneet and Gaenslen's tests are negative   Piriformis maneuvers are negative   Palpation of the bilateral ischial tuberosities, unrevealing   Palpation of the bilateral greater trochanter, unrevealing   Examination of " the Iliotibial band: unrevealing   Hip joints: The range of motion of the hip joints is full and without pain   Neurological: Patient is alert and oriented to person, place, and time. Speech: speech is normal. Cortical function: Normal mental status.   Cranial nerves: Cranial nerves 2-12 intact.   Reflex Scores:  Right brachioradialis: 2+  Left brachioradialis: 2+  Right biceps: 2+  Left biceps: 2+  Right triceps: 2+  Left triceps: 2+  Right patellar: 2+  Left patellar: 2+  Right achilles: 2+  Left achilles: 2+  Motor strength: 5/5  Motor Tone: normal tone.   Involuntary movements: none.   Superficial/Primitive Reflexes: primitive reflexes were absent.   Right Whitfield: absent  Left Whitfield: absent  Right ankle clonus: absent  Left ankle clonus: absent   Negative long tract signs. Straight leg raising test is negative. Femoral stretch sign is negative.   Sensation: No sensory loss. Sensory exam: intact to light touch, intact pain and temperature sensation, intact vibration sensation and normal proprioception.   Coordination: Normal finger to nose and heel to shin. Normal balance and negative. Romberg's sign negative.   Skin and subcutaneous tissue: Skin is warm and intact. No rash noted. No cyanosis.   Psychiatric: Judgment and insight: Normal. Orientation to person, place and time: Normal. Recent and remote memory: Intact. Mood and affect: Normal.     ASSESSMENT:   1. Spondylolisthesis of lumbosacral region    2. Spondylosis of lumbar region without myelopathy or radiculopathy    3. Pars defect with spondylolisthesis    4. Cervical spondylosis without myelopathy    5. Myofascial pain    6. S/P total knee arthroplasty, right    7. Prediabetes      PLAN/MEDICAL DECISION MAKING:  I had a lengthy conversation with  Yang PEEWEE Haji regarding his chronic pain condition and potential therapeutic options including risks, benefits, alternative therapies, to name a few. Patient has failed to obtain pain relief with  conservative measures, as referenced above. Patient experienced significant pain relief after interventional pain management measures, as referenced above. I have reviewed all available patient's medical records as well as previous therapies as referenced above.  Therefore, I have proposed the following plan:  1. Diagnostic studies:  A. MRI of the lumbar spine without contrast  B. X-rays of the cervical spine full view with flexion and extension  C. Flexion and extension X-rays of the lumbar spine  D. EMG/NCV of the bilateral upper and lower extremities   2. Pharmacological measures: Reviewed. Discussed. Patient has failed numerous pharmacological trials and has declined pharmacological measures at this time except for;  A. Trial with tizanidine 2 mg 1/2 to 1 tablet three times a day as needed muscle spasm  B. Patient takes Tylenol and NSAIDs as needed  C. Trial with Rheumate one tablet twice daily  D. Start folic pyridoxine 25 mg one tablet by mouth three times daily  E. Trial with prilocaine 2%, lidocaine 10%, imipramine 3%, capsaicin 0.001% and mannitol 20%  cream, apply 1 to 2 grams of cream to the affected areas every 4 to 6 hours as needed  3. Interventional pain management measures: I have discussed with the patient the possibility of diagnostic and therapeutic left L4-L5 and left L5-S1 transforaminal epidural steroid injections depending on MRI findings. We may repeat another epidural depending on patient's outcome.    4. Long-term rehabilitation efforts:  A. The patient does not have a history of falls. I did complete a risk assessment for falls.   B. Patient will start a comprehensive physical therapy program for water therapy, therapeutic exercise, upper body strengthening/posture correction, core strengthening, gait and balance training, neurodynamics, myofascial release, cupping and dry needling once pain is under control  C. Trial with TENS unit/interferential unit  D. Contrast therapy: Apply ice-packs  for 15-20 minutes, followed by heating pads for 15-20 minutes to affected area   5. The patient and his family have been instructed to contact my office with any questions or difficulties. The patient understands the plan and agrees to proceed accordingly.         Patient Care Team:  Jamarcus Harmon MD as PCP - General (General Surgery)  Coleman Fung MD as Consulting Physician (Pain Medicine)  Nazario Leal MD as Consulting Physician (Orthopedic Surgery)  Juan oGnzalez MD as Consulting Physician (Neurosurgery)  Gal Bueno MD as Consulting Physician (Neurology)     No orders of the defined types were placed in this encounter.        Future Appointments  Date Time Provider Department Center   12/17/2018 9:20 AM Nazario Leal MD [facility] None         Coleman Fung MD     EMR Dragon/Transcription disclaimer:  Much of this encounter note is an electronic transcription of spoken language to printed text. Electronic transcription of spoken language may permit erroneous, or at times, nonsensical words or phrases to be inadvertently transcribed. Although I have reviewed the note for such errors, some may still exist.

## 2018-07-31 ENCOUNTER — HOSPITAL ENCOUNTER (OUTPATIENT)
Dept: GENERAL RADIOLOGY | Facility: HOSPITAL | Age: 68
Discharge: HOME | End: 2018-07-31
Attending: ANESTHESIOLOGY | Admitting: ANESTHESIOLOGY

## 2018-07-31 ENCOUNTER — HOSPITAL ENCOUNTER (OUTPATIENT)
Dept: GENERAL RADIOLOGY | Facility: HOSPITAL | Age: 68
Discharge: HOME | End: 2018-07-31

## 2018-07-31 ENCOUNTER — OFFICE VISIT (OUTPATIENT)
Dept: PAIN MEDICINE | Facility: CLINIC | Age: 68
End: 2018-07-31

## 2018-07-31 VITALS
RESPIRATION RATE: 18 BRPM | DIASTOLIC BLOOD PRESSURE: 80 MMHG | BODY MASS INDEX: 26.74 KG/M2 | TEMPERATURE: 97.1 F | WEIGHT: 201.8 LBS | OXYGEN SATURATION: 99 % | HEART RATE: 59 BPM | SYSTOLIC BLOOD PRESSURE: 124 MMHG | HEIGHT: 73 IN

## 2018-07-31 DIAGNOSIS — M79.18 MYOFASCIAL PAIN: ICD-10-CM

## 2018-07-31 DIAGNOSIS — M43.17 SPONDYLOLISTHESIS OF LUMBOSACRAL REGION: ICD-10-CM

## 2018-07-31 DIAGNOSIS — M47.812 CERVICAL SPONDYLOSIS WITHOUT MYELOPATHY: ICD-10-CM

## 2018-07-31 DIAGNOSIS — M43.00 PARS DEFECT WITH SPONDYLOLISTHESIS: ICD-10-CM

## 2018-07-31 DIAGNOSIS — M43.17 SPONDYLOLISTHESIS OF LUMBOSACRAL REGION: Primary | ICD-10-CM

## 2018-07-31 DIAGNOSIS — R73.03 PREDIABETES: ICD-10-CM

## 2018-07-31 DIAGNOSIS — M47.816 SPONDYLOSIS OF LUMBAR REGION WITHOUT MYELOPATHY OR RADICULOPATHY: ICD-10-CM

## 2018-07-31 DIAGNOSIS — M43.10 PARS DEFECT WITH SPONDYLOLISTHESIS: ICD-10-CM

## 2018-07-31 DIAGNOSIS — Z96.651 S/P TOTAL KNEE ARTHROPLASTY, RIGHT: ICD-10-CM

## 2018-07-31 PROCEDURE — 99204 OFFICE O/P NEW MOD 45 MIN: CPT | Performed by: ANESTHESIOLOGY

## 2018-07-31 PROCEDURE — 72120 X-RAY BEND ONLY L-S SPINE: CPT

## 2018-07-31 PROCEDURE — 72052 X-RAY EXAM NECK SPINE 6/>VWS: CPT

## 2018-07-31 RX ORDER — ME-TETRAHYDROFOLATE/B12/HRB236 1-1-500 MG
CAPSULE ORAL
Qty: 30 CAPSULE | Refills: 5 | Status: SHIPPED | OUTPATIENT
Start: 2018-07-31 | End: 2018-08-15 | Stop reason: SDUPTHER

## 2018-07-31 RX ORDER — PYRIDOXINE HCL (VITAMIN B6) 25 MG
25 TABLET ORAL 3 TIMES DAILY
Qty: 90 TABLET | Refills: 5 | Status: ON HOLD | OUTPATIENT
Start: 2018-07-31 | End: 2021-01-14

## 2018-07-31 RX ORDER — TIZANIDINE 2 MG/1
TABLET ORAL
Qty: 90 TABLET | Refills: 5 | Status: ON HOLD | OUTPATIENT
Start: 2018-07-31 | End: 2021-01-14

## 2018-08-03 ENCOUNTER — HOSPITAL ENCOUNTER (OUTPATIENT)
Dept: MRI IMAGING | Facility: HOSPITAL | Age: 68
Discharge: HOME | End: 2018-08-03
Attending: ANESTHESIOLOGY | Admitting: ANESTHESIOLOGY

## 2018-08-03 PROCEDURE — 72148 MRI LUMBAR SPINE W/O DYE: CPT

## 2018-08-15 ENCOUNTER — OUTSIDE FACILITY SERVICE (OUTPATIENT)
Dept: PAIN MEDICINE | Facility: CLINIC | Age: 68
End: 2018-08-15

## 2018-08-15 PROCEDURE — 64484 NJX AA&/STRD TFRM EPI L/S EA: CPT | Performed by: ANESTHESIOLOGY

## 2018-08-15 PROCEDURE — 99152 MOD SED SAME PHYS/QHP 5/>YRS: CPT | Performed by: ANESTHESIOLOGY

## 2018-08-15 PROCEDURE — 64483 NJX AA&/STRD TFRM EPI L/S 1: CPT | Performed by: ANESTHESIOLOGY

## 2018-08-15 RX ORDER — ME-TETRAHYDROFOLATE/B12/HRB236 1-1-500 MG
CAPSULE ORAL
Qty: 90 CAPSULE | Refills: 3 | Status: SHIPPED | OUTPATIENT
Start: 2018-08-15 | End: 2020-01-30

## 2018-08-16 ENCOUNTER — TELEPHONE (OUTPATIENT)
Dept: PAIN MEDICINE | Facility: CLINIC | Age: 68
End: 2018-08-16

## 2018-08-16 NOTE — TELEPHONE ENCOUNTER
Patient had dx/tx left L4-L5 and left L5-S1 TFESI on 08/15/2018. Patient reports that he did well. He is still having some leg pain. Advised that it can take up to 2 weeks for injection to take full effect. In the meantime he can alternate ice and heat as needed and start physical therapy as soon as possible.

## 2018-08-17 DIAGNOSIS — M43.10 PARS DEFECT WITH SPONDYLOLISTHESIS: ICD-10-CM

## 2018-08-17 DIAGNOSIS — M43.00 PARS DEFECT WITH SPONDYLOLISTHESIS: ICD-10-CM

## 2018-08-17 DIAGNOSIS — M47.816 SPONDYLOSIS OF LUMBAR REGION WITHOUT MYELOPATHY OR RADICULOPATHY: ICD-10-CM

## 2018-08-17 DIAGNOSIS — M43.17 SPONDYLOLISTHESIS OF LUMBOSACRAL REGION: Primary | ICD-10-CM

## 2018-08-24 ENCOUNTER — HOSPITAL ENCOUNTER (OUTPATIENT)
Dept: NEUROLOGY | Facility: HOSPITAL | Age: 68
Discharge: HOME | End: 2018-08-24
Attending: ANESTHESIOLOGY | Admitting: ANESTHESIOLOGY

## 2018-08-24 DIAGNOSIS — M43.00 PARS DEFECT WITH SPONDYLOLISTHESIS: ICD-10-CM

## 2018-08-24 DIAGNOSIS — M43.17 SPONDYLOLISTHESIS OF LUMBOSACRAL REGION: ICD-10-CM

## 2018-08-24 DIAGNOSIS — M43.10 PARS DEFECT WITH SPONDYLOLISTHESIS: ICD-10-CM

## 2018-08-24 DIAGNOSIS — M47.816 SPONDYLOSIS OF LUMBAR REGION WITHOUT MYELOPATHY OR RADICULOPATHY: ICD-10-CM

## 2018-08-24 PROCEDURE — 95910 NRV CNDJ TEST 7-8 STUDIES: CPT

## 2018-08-24 PROCEDURE — 95886 MUSC TEST DONE W/N TEST COMP: CPT

## 2018-09-17 ENCOUNTER — APPOINTMENT (OUTPATIENT)
Dept: NEUROLOGY | Facility: HOSPITAL | Age: 68
End: 2018-09-17
Attending: ANESTHESIOLOGY

## 2018-12-17 ENCOUNTER — OFFICE VISIT (OUTPATIENT)
Dept: ORTHOPEDIC SURGERY | Facility: CLINIC | Age: 68
End: 2018-12-17

## 2018-12-17 VITALS — BODY MASS INDEX: 26.36 KG/M2 | WEIGHT: 198.85 LBS | HEIGHT: 73 IN

## 2018-12-17 DIAGNOSIS — Z09 POSTOPERATIVE EXAMINATION: ICD-10-CM

## 2018-12-17 DIAGNOSIS — Z96.651 STATUS POST TOTAL RIGHT KNEE REPLACEMENT: Primary | ICD-10-CM

## 2018-12-17 PROCEDURE — 99213 OFFICE O/P EST LOW 20 MIN: CPT | Performed by: ORTHOPAEDIC SURGERY

## 2018-12-17 RX ORDER — PANTOPRAZOLE SODIUM 40 MG/1
40 TABLET, DELAYED RELEASE ORAL DAILY
COMMUNITY
Start: 2018-12-12

## 2018-12-17 ASSESSMENT — KOOS JR
KOOS JR SCORE: 70.704
KOOS JR SCORE: 6

## 2018-12-17 NOTE — PROGRESS NOTES
[facility] [place] [facility] Note    Subjective     Chief Complaint   Patient presents with   • Right Knee - Follow-up     6 month f/u  1 year post right Total Knee Arthroplasty 12/12/17        HPI    Yang Haji is a 68 y.o. male.  He follows up today for his right total knee arthroplasty.  He is doing well, with 100% relief compared to his preoperative symptoms.  Fully ambulatory without external aids.  No complaints.      Patient Active Problem List   Diagnosis   • Primary osteoarthritis of right knee   • S/P total knee arthroplasty, right   • Tobacco abuse   • Prediabetes   • Spondylolisthesis of lumbosacral region   • Spondylosis of lumbar region without myelopathy or radiculopathy   • Pars defect with spondylolisthesis   • Myofascial pain   • Cervical spondylosis without myelopathy     Past Medical History:   Diagnosis Date   • Adult BMI 28.0-28.9 kg/sq m    • Back pain    • Chronic pain of right knee    • PONV (postoperative nausea and vomiting)    • Primary osteoarthritis of right knee    • Staph infection 1974    right knee, treated with antibiotics    • Wears glasses       Past Surgical History:   Procedure Laterality Date   • COLONOSCOPY  10/2017   • KNEE SURGERY Right 1974    debridement d/t staph infection   • LASIK Bilateral    • MEDIAL COLLATERAL LIGAMENT REPAIR, KNEE Right 1974   • RHIZOTOMY        Family History   Problem Relation Age of Onset   • Diabetes Father    • Heart disease Father    • Hypertension Father    • Osteoarthritis Father    • Stroke Father    • Heart attack Father    • Cancer Mother      Social History     Socioeconomic History   • Marital status:      Spouse name: Not on file   • Number of children: Not on file   • Years of education: Not on file   • Highest education level: Not on file   Social Needs   • Financial resource strain: Not on file   • Food insecurity - worry: Not on file   • Food insecurity - inability: Not on file   • Transportation needs -  medical: Not on file   • Transportation needs - non-medical: Not on file   Occupational History   • Not on file   Tobacco Use   • Smoking status: Never Smoker   • Smokeless tobacco: Current User     Types: Chew   • Tobacco comment: Less than 1/2 can of smokeless tobacco per week   Substance and Sexual Activity   • Alcohol use: Not on file     Comment: occasional   • Drug use: No   • Sexual activity: Defer   Other Topics Concern   • Not on file   Social History Narrative   • Not on file      Current Outpatient Medications on File Prior to Visit   Medication Sig Dispense Refill   • aspirin 81 MG EC tablet Take 1 tablet by mouth Daily. Resume in 1 month     • Dietary Management Product (RHEUMATE) capsule Take 1 capsule once daily 90 capsule 3   • Gel Base gel CAPSAICIN 0.001% IMIPRAMINE 3% LIDOCAINE 10% PRILOCAINE 2% MANNITOL 20%. APPLY 1-2 G TO AFFECTED AREA 3-4 TIMES DAILY 240 g PRN   • pantoprazole (PROTONIX) 40 MG EC tablet      • pyridoxine (VITAMIN B-6) 25 MG tablet Take 1 tablet by mouth 3 (Three) Times a Day. 90 tablet 5   • tiZANidine (ZANAFLEX) 2 MG tablet Take half to 1 tablet three times a day as needed for muscle spasms. 90 tablet 5     No current facility-administered medications on file prior to visit.       No Known Allergies     Review of Systems   Constitutional: Negative for activity change, appetite change, chills, diaphoresis, fatigue, fever and unexpected weight change.   HENT: Negative for congestion, dental problem, drooling, ear discharge, ear pain, facial swelling, hearing loss, mouth sores, nosebleeds, postnasal drip, rhinorrhea, sinus pressure, sneezing, sore throat, tinnitus, trouble swallowing and voice change.    Eyes: Negative for photophobia, pain, discharge, redness, itching and visual disturbance.   Respiratory: Negative for apnea, cough, choking, chest tightness, shortness of breath, wheezing and stridor.    Cardiovascular: Negative for chest pain, palpitations and leg swelling.  "  Gastrointestinal: Negative for abdominal distention, abdominal pain, anal bleeding, blood in stool, constipation, diarrhea, nausea, rectal pain and vomiting.   Endocrine: Negative for cold intolerance, heat intolerance, polydipsia, polyphagia and polyuria.   Genitourinary: Negative for decreased urine volume, difficulty urinating, dysuria, enuresis, flank pain, frequency, genital sores, hematuria and urgency.   Musculoskeletal: Positive for arthralgias. Negative for back pain, gait problem, joint swelling, myalgias, neck pain and neck stiffness.   Skin: Negative for color change, pallor, rash and wound.   Allergic/Immunologic: Negative for environmental allergies, food allergies and immunocompromised state.   Neurological: Negative for dizziness, tremors, seizures, syncope, facial asymmetry, speech difficulty, weakness, light-headedness, numbness and headaches.   Hematological: Negative for adenopathy. Does not bruise/bleed easily.   Psychiatric/Behavioral: Negative for agitation, behavioral problems, confusion, decreased concentration, dysphoric mood, hallucinations, self-injury, sleep disturbance and suicidal ideas. The patient is not nervous/anxious and is not hyperactive.         Objective      Physical Exam  Ht 185.4 cm (72.99\")   Wt 90.2 kg (198 lb 13.7 oz)   BMI 26.24 kg/m²     Body mass index is 26.24 kg/m².    General:   Mental Status:  Alert   Appearance: Cooperative, in no acute distress   Build and Nutrition: Well-nourished and well developed male   Orientation: Alert and oriented to person, place and time   Posture: Normal   Gait: Normal    Integument:   Right knee: Wound is well-healed with no signs of infection    Lower Extremities:   Right Knee:    Tenderness:  None    Effusion:  None    Swelling: None    Crepitus:  None    Range of motion:  Extension: 0°       Flexion: 125°  Instability:  No varus laxity, no valgus laxity, negative anterior drawer  Deformities: "  None    Imaging/Studies      Imaging Results (last 24 hours)     Procedure Component Value Units Date/Time    XR Knee 3+ View With Marie Right [[id number]] Resulted:  12/17/18 0920     Updated:  12/17/18 0921    Narrative:       Right Knee Radiographs  Indication: status-post right total knee arthroplasty  Views: AP, lateral, and sunrise views of the right knee    Comparison: no change compared to prior study, 6/25/2018    Findings:   The components are well aligned, with no signs of loosening or failure.          Assessment and Plan     Yang was seen today for follow-up.    Diagnoses and all orders for this visit:    Status post total right knee replacement  -     XR Knee 3+ View With Marie Right    Postoperative examination        I reviewed my findings with patient today.  His right total knee arthroplasty is functioning well, and he is pleased with results.  I will see him back in 4 years with x-rays, but sooner for any problems.    Return in about 4 years (around 12/17/2022) for Recheck with X-Rays.      Medical Decision Making  Data/Risk: radiology tests and independent visualization of imaging, lab tests, or EMG/NCV      Nazario Leal MD  12/17/18  9:43 AM

## 2020-01-30 RX ORDER — ME-TETRAHYDROFOLATE/B12/HRB236 1-1-500 MG
CAPSULE ORAL
Qty: 90 CAPSULE | Refills: 2 | Status: ON HOLD | OUTPATIENT
Start: 2020-01-30 | End: 2021-01-14

## 2021-01-08 ENCOUNTER — TRANSCRIBE ORDERS (OUTPATIENT)
Dept: ADMINISTRATIVE | Facility: HOSPITAL | Age: 71
End: 2021-01-08

## 2021-01-08 DIAGNOSIS — R94.39 ABNORMAL STRESS TEST: Primary | ICD-10-CM

## 2021-01-14 ENCOUNTER — HOSPITAL ENCOUNTER (OUTPATIENT)
Facility: HOSPITAL | Age: 71
Discharge: HOME | End: 2021-01-14
Attending: INTERNAL MEDICINE | Admitting: INTERNAL MEDICINE

## 2021-01-14 VITALS
TEMPERATURE: 97 F | RESPIRATION RATE: 16 BRPM | SYSTOLIC BLOOD PRESSURE: 112 MMHG | DIASTOLIC BLOOD PRESSURE: 74 MMHG | WEIGHT: 188.05 LBS | BODY MASS INDEX: 25.47 KG/M2 | HEIGHT: 72 IN | OXYGEN SATURATION: 98 % | HEART RATE: 48 BPM

## 2021-01-14 DIAGNOSIS — R94.39 ABNORMAL STRESS TEST: ICD-10-CM

## 2021-01-14 LAB
ANION GAP SERPL CALCULATED.3IONS-SCNC: 8 MMOL/L (ref 5–15)
BUN SERPL-MCNC: 13 MG/DL (ref 8–23)
BUN/CREAT SERPL: 12.1 (ref 7–25)
CALCIUM SPEC-SCNC: 9.5 MG/DL (ref 8.6–10.5)
CHLORIDE SERPL-SCNC: 101 MMOL/L (ref 98–107)
CO2 SERPL-SCNC: 28 MMOL/L (ref 22–29)
CREAT SERPL-MCNC: 1.07 MG/DL (ref 0.76–1.27)
DEPRECATED RDW RBC AUTO: 42.5 FL (ref 37–54)
ERYTHROCYTE [DISTWIDTH] IN BLOOD BY AUTOMATED COUNT: 12.3 % (ref 12.3–15.4)
GFR SERPL CREATININE-BSD FRML MDRD: 69 ML/MIN/1.73
GLUCOSE SERPL-MCNC: 91 MG/DL (ref 65–99)
HCT VFR BLD AUTO: 44 % (ref 37.5–51)
HGB BLD-MCNC: 14.8 G/DL (ref 13–17.7)
MCH RBC QN AUTO: 31.8 PG (ref 26.6–33)
MCHC RBC AUTO-ENTMCNC: 33.6 G/DL (ref 31.5–35.7)
MCV RBC AUTO: 94.4 FL (ref 79–97)
PLATELET # BLD AUTO: 271 10*3/MM3 (ref 140–450)
PMV BLD AUTO: 9.7 FL (ref 6–12)
POTASSIUM SERPL-SCNC: 4 MMOL/L (ref 3.5–5.2)
RBC # BLD AUTO: 4.66 10*6/MM3 (ref 4.14–5.8)
SODIUM SERPL-SCNC: 137 MMOL/L (ref 136–145)
WBC # BLD AUTO: 3.86 10*3/MM3 (ref 3.4–10.8)

## 2021-01-14 PROCEDURE — C1769 GUIDE WIRE: HCPCS | Performed by: INTERNAL MEDICINE

## 2021-01-14 PROCEDURE — 25010000002 FENTANYL CITRATE (PF) 100 MCG/2ML SOLUTION: Performed by: INTERNAL MEDICINE

## 2021-01-14 PROCEDURE — C1894 INTRO/SHEATH, NON-LASER: HCPCS | Performed by: INTERNAL MEDICINE

## 2021-01-14 PROCEDURE — 80048 BASIC METABOLIC PNL TOTAL CA: CPT

## 2021-01-14 PROCEDURE — 25010000002 HEPARIN (PORCINE) PER 1000 UNITS: Performed by: INTERNAL MEDICINE

## 2021-01-14 PROCEDURE — 85027 COMPLETE CBC AUTOMATED: CPT

## 2021-01-14 PROCEDURE — 99152 MOD SED SAME PHYS/QHP 5/>YRS: CPT | Performed by: INTERNAL MEDICINE

## 2021-01-14 PROCEDURE — 93458 L HRT ARTERY/VENTRICLE ANGIO: CPT | Performed by: INTERNAL MEDICINE

## 2021-01-14 PROCEDURE — 99153 MOD SED SAME PHYS/QHP EA: CPT | Performed by: INTERNAL MEDICINE

## 2021-01-14 PROCEDURE — 0 IOPAMIDOL PER 1 ML: Performed by: INTERNAL MEDICINE

## 2021-01-14 PROCEDURE — 25010000002 MIDAZOLAM PER 1 MG: Performed by: INTERNAL MEDICINE

## 2021-01-14 RX ORDER — SODIUM CHLORIDE 9 MG/ML
250 INJECTION, SOLUTION INTRAVENOUS CONTINUOUS
Status: ACTIVE | OUTPATIENT
Start: 2021-01-14 | End: 2021-01-14

## 2021-01-14 RX ORDER — HYDROCODONE BITARTRATE AND ACETAMINOPHEN 5; 325 MG/1; MG/1
1 TABLET ORAL EVERY 4 HOURS PRN
Status: DISCONTINUED | OUTPATIENT
Start: 2021-01-14 | End: 2021-01-14 | Stop reason: HOSPADM

## 2021-01-14 RX ORDER — ALPRAZOLAM 0.25 MG/1
0.25 TABLET ORAL 3 TIMES DAILY PRN
Status: DISCONTINUED | OUTPATIENT
Start: 2021-01-14 | End: 2021-01-14 | Stop reason: HOSPADM

## 2021-01-14 RX ORDER — LIDOCAINE HYDROCHLORIDE 10 MG/ML
INJECTION, SOLUTION EPIDURAL; INFILTRATION; INTRACAUDAL; PERINEURAL AS NEEDED
Status: DISCONTINUED | OUTPATIENT
Start: 2021-01-14 | End: 2021-01-14 | Stop reason: HOSPADM

## 2021-01-14 RX ORDER — NALOXONE HCL 0.4 MG/ML
0.4 VIAL (ML) INJECTION
Status: DISCONTINUED | OUTPATIENT
Start: 2021-01-14 | End: 2021-01-14 | Stop reason: HOSPADM

## 2021-01-14 RX ORDER — MIDAZOLAM HYDROCHLORIDE 1 MG/ML
INJECTION INTRAMUSCULAR; INTRAVENOUS AS NEEDED
Status: DISCONTINUED | OUTPATIENT
Start: 2021-01-14 | End: 2021-01-14 | Stop reason: HOSPADM

## 2021-01-14 RX ORDER — ASPIRIN 325 MG
325 TABLET, DELAYED RELEASE (ENTERIC COATED) ORAL DAILY
Status: DISCONTINUED | OUTPATIENT
Start: 2021-01-14 | End: 2021-01-14 | Stop reason: HOSPADM

## 2021-01-14 RX ORDER — TEMAZEPAM 7.5 MG/1
7.5 CAPSULE ORAL NIGHTLY PRN
Status: DISCONTINUED | OUTPATIENT
Start: 2021-01-14 | End: 2021-01-14 | Stop reason: HOSPADM

## 2021-01-14 RX ORDER — FENTANYL CITRATE 50 UG/ML
INJECTION, SOLUTION INTRAMUSCULAR; INTRAVENOUS AS NEEDED
Status: DISCONTINUED | OUTPATIENT
Start: 2021-01-14 | End: 2021-01-14 | Stop reason: HOSPADM

## 2021-01-14 RX ORDER — MORPHINE SULFATE 2 MG/ML
1 INJECTION, SOLUTION INTRAMUSCULAR; INTRAVENOUS EVERY 4 HOURS PRN
Status: DISCONTINUED | OUTPATIENT
Start: 2021-01-14 | End: 2021-01-14 | Stop reason: HOSPADM

## 2021-01-14 RX ORDER — ROSUVASTATIN CALCIUM 10 MG/1
10 TABLET, COATED ORAL DAILY
Qty: 30 TABLET | Refills: 11 | Status: SHIPPED | OUTPATIENT
Start: 2021-01-14

## 2021-01-14 RX ORDER — ACETAMINOPHEN 325 MG/1
650 TABLET ORAL EVERY 4 HOURS PRN
Status: DISCONTINUED | OUTPATIENT
Start: 2021-01-14 | End: 2021-01-14 | Stop reason: HOSPADM

## 2021-01-14 RX ADMIN — ASPIRIN 325 MG: 325 TABLET, COATED ORAL at 07:59

## 2021-01-14 NOTE — H&P
Pre-Cardiac Catheterization Report  Cardiovascular Laboratory  [facility]      Patient:  Yang Haji  :  1950  PCP:  Jamarcus Harmon MD  PHONE:  [phone]    DATE: 2021    BRIEF HPI:  Yang Haji is a 70 y.o. male with hypertension and a family history of coronary artery disease.  He is complaining of chest pain which he describes as as pressure that has been occurring for the past month.  He states it is moderate in severity lasting seconds with associated shortness of breath and palpitations.  He is also had fever and aches with COVID-19 in December.  He recently underwent an abnormal stress test and now presents for left heart catheterization with possible intervention.    Cardiac Risk Factors:  advanced age (older than 55 for men, 65 for women), family history of premature cardiovascular disease, hypertension, male gender    Anginal class in last 2 weeks:  CCS class III    CHF Class in last 2 weeks:  NYHA Class II    Cardiogenic shock:  no    Cardiac arrest <24 hours:  no    Stress test within last 6 months:   yes   Details:    Previous cardiac catheterization:  no  Details:     Previous CABG:  no  Details:      Allergies:     IV contrast allergy:  no  No Known Allergies    MEDICATIONS:  Prior to Admission medications    Medication Sig Start Date End Date Taking? Authorizing Provider   aspirin 81 MG EC tablet Take 1 tablet by mouth Daily. Resume in 1 month  Patient taking differently: Take 81 mg by mouth Daily. 17  Yes Lulu Salazar MD   Cholecalciferol (VITAMIN D3 PO) Take 1 capsule by mouth Daily.   Yes Edna Mack MD   Multiple Vitamins-Minerals (ZINC PO) Take 1 tablet by mouth Daily.   Yes Edna Mack MD   pantoprazole (PROTONIX) 40 MG EC tablet Take 40 mg by mouth Daily. 18  Yes Edna Mack MD   Dietary Management Product (RHEUMATE) capsule Take 1 capsule by mouth every day 20  Coleman Fung MD   Gel  Base gel CAPSAICIN 0.001% IMIPRAMINE 3% LIDOCAINE 10% PRILOCAINE 2% MANNITOL 20%. APPLY 1-2 G TO AFFECTED AREA 3-4 TIMES DAILY 8/1/18 1/14/21  Coleman Fung MD   pyridoxine (VITAMIN B-6) 25 MG tablet Take 1 tablet by mouth 3 (Three) Times a Day. 7/31/18 1/14/21  Coleman Fung MD   tiZANidine (ZANAFLEX) 2 MG tablet Take half to 1 tablet three times a day as needed for muscle spasms. 7/31/18 1/14/21  Coleman Fung MD       Past medical & surgical history, social and family history reviewed in the electronic medical record.    ROS:  Cardiovascular ROS: positive for - chest pain, palpitations and shortness of breath    Physical Exam:    Vitals:   Vitals:    01/14/21 0655   BP: 143/83   Pulse: 50   Resp: 16   Temp: 97 °F (36.1 °C)   SpO2: 96%          01/14/21  0655   Weight: 85.3 kg (188 lb 0.8 oz)       General Appearance:    Alert, cooperative, in no acute distress   Head:    Normocephalic, without obvious abnormality, atraumatic   Eyes:            Lids and lashes normal, conjunctivae and sclerae normal, no   icterus, no pallor, corneas clear, PERRLA   Ears:    Ears appear intact with no abnormalities noted   Neck:   No adenopathy, supple, trachea midline, no thyromegaly, no   carotid bruit, no JVD   Back:     No kyphosis present, no scoliosis present, range of motion normal   Lungs:     Clear to auscultation,respirations regular, even and                unlabored    Heart:    Regular rhythm and normal rate, normal S1 and S2, no         murmur, no gallop, no rub, no click   Chest Wall:    No abnormalities observed   Abdomen:     Normal bowel sounds, no masses, no organomegaly, soft     nontender, nondistended, no guarding, no rebound                tenderness   Rectal:     Deferred   Extremities:   Moves all extremities well, no edema, no cyanosis, no           redness   Pulses:   Pulses palpable and equal bilaterally   Skin:   No bleeding, bruising or rash   Neurologic:   Cranial nerves 2 - 12 grossly  intact, sensation intact     Barbaeu Test:  Left: Normal  (oxymetric Allens) Right: Not Assessed         Results from last 7 days   Lab Units 01/14/21  0651   WBC 10*3/mm3 3.86   HEMOGLOBIN g/dL 14.8   HEMATOCRIT % 44.0   PLATELETS 10*3/mm3 271     Lab Results   Component Value Date    AST 33 12/05/2017    ALT 23 12/05/2017           Impression      · Abnormal stress test    Plan     · Procedure to perform: [facility]  · Planned access: Per JOHNNIE Jay  01/14/21  07:19 EST

## 2021-02-25 DIAGNOSIS — Z23 IMMUNIZATION DUE: ICD-10-CM

## 2021-03-01 PROBLEM — M48.062 LUMBAR STENOSIS WITH NEUROGENIC CLAUDICATION: Status: ACTIVE | Noted: 2021-03-01

## 2021-03-01 NOTE — PROGRESS NOTES
"Chief Complaint: \"Pain in my lower back, left hip and left leg to my left foot.\"        History of Present Illness:   Patient: Mr. Yang Haji, 70 y.o. male   Reason for Referral: Consultation for intractable chronic lower and neck pain.     PROBLEM #1: Lower Back and Left Lower Extremity  Pain History: Patient reports a several year history of lower back and left lower extremity pain, which began without incident. Patient was last seen on 08/15/2018, when he underwent diagnostic and therapeutic left L4-L5 and left L5-S1 TFESI from which he experienced milton than 70% pain relief and functional improvement that lasted for about 2 years.  Previously, in 2015, patient underwent lumbar medial branch rhizotomies with excellent analgesic and functional outcome.  Pain Description: Constant pain with intermittent exacerbation described as burning, sharp, and tingling sensation  Radiation of pain: The pain radiates from the lumbar region into the posterior aspect of the left hip, the left thigh, left calf, and into the dorsal aspect of the left foot.  The hip pain is more severe than the lower back pain  Pain intensity today: 7/10  Average pain intensity last week: 7/10  Pain intensity ranges from 7/10 to 9/10  Aggravating factors: Pain increases with standing longer than 2 to 5 minutes and walking   Alleviating factors: Pain decreases with sitting down or lying down  Associated Symptoms:  Patient reports pain, tingling, numbness, weakness in the left lower extremity.  Patient denies right-sided symptoms  Patient denies any new bladder or bowel problems  Patient reports difficulty with his balance but denies recent falls     PROBLEM #2: Neck Pain  Pain History: Patient reports a more than 40-year history of progressive posterior cervical pain, which started after respiratory injuries.  Pain Description:  Constant pain with intermittent exacerbation described as a sharp sensation  Radiation of pain: None  Pain intensity " today: 3/10 (at rest), 7/10 (with rotation of the cervical spine)  Average pain intensity last week: 7/10  Pain intensity ranges from 3/10 to 10/10  Aggravating factors: Pain increases with rotation of the cervical spine  Alleviating factors: Pain decreases with rest  Associated Symptoms:  Patient denies pain, numbness, or weakness in the upper extremities    Review of previous therapies and additional medical records:  Mr. Yang Haji has already failed the following measures, including:  Conservative Measures: Oral analgesics, TENS unit, supervised exercise program, ice, heat, physical therapy   Interventional Pain Management Measures:  8/15/2018: Diagnostic and therapeutic left L4-L5 and left L5-S1 transforaminal epidural steroid injections  February 2015: Bilateral lumbar medial branch rhizotomies L2, L3, L4, L5; for bilateral lumbar facet joints at L3-L4, L4-L5, and L5-S1 with pain relief that lasted for almost 3 years  Surgical Measures: No history of previous lumbar spine or hip surgery.  History of right total knee arthroplasty on 12/17/2017  Mr. Yang Haji underwent neurosurgical consultation with Dr. Juan Gonzalez a few years ago and was found to be a potential surgical candidate  Mr. Yang Haji presents with significant comorbidities including diffuse osteoarthritis, chronic pain  In terms of current analgesics, Mr. Mendoza takes: Tylenol/NSAIDs  I have reviewed [facility] report #[id number] consistent with medication reconciliation      Global Pain Scale 07-31  2018 03-02 2021               Pain  21  20               Feelings  12    0               Clinical outcomes  18  20               Activities  10  20               GPS Total:  61  60                  Review of Diagnostic Studies:     EMG/NCV of the lower extremities 8/17/2018  Chronic left L5 radiculopathy  Mild left peroneal neuropathy at the knee  Mild peripheral polyneuropathy  MRI of the lumbar spine without contrast 8/3/2018.  I have  reviewed the images.  The conus ends at L1 and has an unremarkable appearance.  Vertebral body heights are maintained.  There is mild retrolisthesis of L2 on L3.  There is grade 2 anterolisthesis of L5 on S1 with bilateral L5 pars defects.  Axial imaging:  T12-L1: No significant canal or foraminal stenosis  L1-L2: Diffuse disc bulge, facet arthropathy.  No significant canal stenosis.  Mild foraminal stenosis  L2-L3: Disc bulge, facet arthropathy.  Severe right and moderate left foraminal stenosis.  Moderate spinal canal stenosis  L3-L4: Disc bulge, facet arthropathy.  Severe bilateral neuroforaminal stenosis.  Severe left and moderate right subarticular zone stenosis.  Moderate central canal stenosis  L4-L5: Disc bulge, facet arthropathy.  No significant canal stenosis.  Severe bilateral neuroforaminal stenosis  L5-S1: Mild central canal stenosis.  Severe left and moderate right neuroforaminal stenosis.  Advanced lumbar facet arthropathy  Flexion and extension x-rays of the lumbar spine 7/31/2018.  I have reviewed the images.  Stable retrolisthesis of L2 on L3 during flexion and extension.  The anterolisthesis at L5-S1 is slightly more pronounced on flexion than extension  Bone Scan, 01/22/2014: Normal bone mineral density in both hips and in the lumbar spine.     Review of Systems   Endocrine: Positive for cold intolerance.   Genitourinary: Positive for urgency.   Musculoskeletal: Positive for arthralgias, back pain, gait problem, myalgias, neck pain and neck stiffness.   Neurological: Positive for numbness.   Psychiatric/Behavioral: Positive for sleep disturbance.   All other systems reviewed and are negative.        Patient Active Problem List   Diagnosis   • Primary osteoarthritis of right knee   • S/P total knee arthroplasty, right   • Tobacco abuse   • Prediabetes   • Spondylolisthesis of lumbosacral region   • Spondylosis of lumbar region without myelopathy or radiculopathy   • Pars defect with  spondylolisthesis   • Myofascial pain   • Cervical spondylosis without myelopathy   • Abnormal stress test   • Lumbar stenosis with neurogenic claudication   • Left foot pain       Past Medical History:   Diagnosis Date   • Adult BMI 28.0-28.9 kg/sq m    • Back pain    • Cervical spondylosis without myelopathy    • Chest pain    • Chronic pain of right knee    • Palpitations    • Pars defect with spondylolisthesis    • PONV (postoperative nausea and vomiting)    • Prediabetes    • Primary osteoarthritis of right knee    • SOB (shortness of breath)    • Spondylolisthesis of lumbosacral region    • Spondylosis of lumbar region without myelopathy or radiculopathy    • Staph infection 1974    right knee, treated with antibiotics    • Tobacco abuse    • Wears glasses          Past Surgical History:   Procedure Laterality Date   • CARDIAC CATHETERIZATION N/A 1/14/2021    Procedure: Left Heart Cath;  Surgeon: Nazario Salmon MD;  Location:  [facility];  Service: Cardiology;  Laterality: N/A;   • COLONOSCOPY  10/2017   • KNEE SURGERY Right 1974    debridement d/t staph infection   • LASIK Bilateral    • MEDIAL COLLATERAL LIGAMENT REPAIR, KNEE Right 1974   • RHIZOTOMY     • TOTAL KNEE ARTHROPLASTY Right 12/12/2017    Procedure: TOTAL KNEE ARTHROPLASTY RIGHT;  Surgeon: Nazario Leal MD;  Location:  [facility];  Service:          Family History   Problem Relation Age of Onset   • Diabetes Father    • Heart disease Father    • Hypertension Father    • Osteoarthritis Father    • Stroke Father    • Heart attack Father    • Cancer Mother          Social History     Socioeconomic History   • Marital status:      Spouse name: Not on file   • Number of children: Not on file   • Years of education: Not on file   • Highest education level: Not on file   Tobacco Use   • Smoking status: Never Smoker   • Smokeless tobacco: Current User     Types: Chew   • Tobacco comment: Less than 1/2 can of smokeless  "tobacco per week   Substance and Sexual Activity   • Drug use: No   • Sexual activity: Defer        Current Outpatient Medications:   •  aspirin 81 MG EC tablet, Take 1 tablet by mouth Daily. Resume in 1 month (Patient taking differently: Take 81 mg by mouth Daily.), Disp: , Rfl:   •  Cholecalciferol (VITAMIN D3 PO), Take 1 capsule by mouth Daily., Disp: , Rfl:   •  Dietary Management Product (Rheumate) capsule, Take  by mouth., Disp: , Rfl:   •  melatonin 5 MG tablet tablet, Take 5 mg by mouth., Disp: , Rfl:   •  Multiple Vitamins-Minerals (ZINC PO), Take 1 tablet by mouth Daily., Disp: , Rfl:   •  pantoprazole (PROTONIX) 40 MG EC tablet, Take 40 mg by mouth Daily., Disp: , Rfl:   •  rosuvastatin (CRESTOR) 10 MG tablet, Take 1 tablet by mouth Daily., Disp: 30 tablet, Rfl: 11      No Known Allergies      /76   Pulse 55   Temp 98.1 °F (36.7 °C)   Resp 16   Ht 182.9 cm (72\")   Wt 86.1 kg (189 lb 12.8 oz)   SpO2 98%   BMI 25.74 kg/m²       Physical Exam:  Constitutional: Patient appears well-developed, well-nourished, younger than chronological age  HEENT: Head normocephalic and atraumatic.  Eyes: Conjunctivae and lids are normal.  Pupils: Equal round and reactive to light  Neck: Supple, no LAD, no JVD, trachea midline  Lungs: Clear to auscultation, good air entrance bilaterally  Heart: Regular rhythm, S1, S2, no murmurs.  Peripheral vascular exam pulses 2+ throughout.  No edema  Musculoskeletal:  Gait and station: Normal gait  Cervical spine: Passive and active range of motion of the cervical spine are limited secondary to pain.  Extension, lateral flexion, and rotation of cervical spine increased and reproduced neck pain.  Cervical facet joint line maneuvers are positive  Muscles: Presence of active trigger points of the levator scapula and trapezius bilaterally  Shoulders: Active and passive range of motion of the glenohumeral joints is almost complete and without significant pain.  Rotator cuff " strength is 5/5  Lumbar spine: Passive and active range of motion of the lumbar spine are decreased secondary to pain.  Extension, rotation, lateral flexion of the lumbar spine increased reproduce pain.  Lumbar facet joint line maneuvers are positive  Puneet test, Gaenslen's tests, SI compression test: Negative  Piriformis maneuvers: Negative  Hips: The range of motion of the hip joints is slightly reduced, symmetrical, without pain  Examination of the left foot is unrevealing.  There is no pain upon lateral compression of the forefoot or palpation of the plantar or dorsal aspects of the foot.  Tarsometatarsal phalangeal joints, no pain.  Peripheral pulses intact.  Neurological:  Patient is alert, oriented to person, place, and time.  Speech: Normal  Cortical function: Normal mental status  Cranial nerves II through XII: Intact  Reflex scores:   Right brachioradialis: 2+  Left brachioradialis: 2+   Right biceps: 2+  Left biceps: 2+  Right triceps: 2+  Left triceps: 2+   Right patellar: 2+   Left patellar: 2+   Right Achilles: 2+   Left Achilles: 2+  Motor strength: 5/5  Motor tone: Normal  Involuntary movements: None  Spurling sign: Negative neck tornado test: Negative Lhermitte sign: Negative.  Long tract signs: Negative.  Straight leg raising test: Negative femoral stretch sign: Negative  Sensory exam: Intact to light touch, pain, temperature, vibration, proprioception  Coordination: Normal finger-to-nose and heel-to-dowling.  Romberg sign negative  Skin is cutaneous tissue: Skin is warm and intact.  No rashes.  No cyanosis  Psychiatric: Judgment and insight: Normal.  Recent and remote memory intact.  Mood and affect: Normal    ASSESSMENT:   1. Spondylolisthesis of lumbosacral region    2. Pars defect with spondylolisthesis    3. Lumbar stenosis with neurogenic claudication    4. Spondylosis of lumbar region without myelopathy or radiculopathy    5. Cervical spondylosis without myelopathy    6. S/P total knee  arthroplasty, right    7. Left foot pain    8. Myofascial pain    9. Prediabetes         PLAN/MEDICAL DECISION MAKING: Mr. Yang Haji presents with a longstanding history of multifactorial chronic lower back pain. MRI from 2018 revealed advanced multilevel degenerative changes particularly of facet hypertrophy, degenerative disc disease previous degrees of canal and neuroforaminal stenosis.  L3-L4: Moderate canal stenosis with severe bilateral foraminal stenosis.  L4-5 severe bilateral foraminal stenosis.  L5-S1, grade 2 spondylolisthesis with severe left and moderate right neuroforaminal stenosis.  Flexion-extension x-rays of the lumbar spine revealed mild instability with spondylolisthesis at L5-S1 during flexion.  Electrodiagnostic studies of the lower extremities from 2018 revealed chronic left L5 radiculopathy.  Patient has previously failed to obtain pain relief conservative measures.  Patient has responded well to interventional pain management measures, as referenced in the HPI.  I have a lengthy conversation with Mr. Haji  regarding his cramping condition and potential therapeutic options including risks, benefits, alternative therapies, to name a few.  I have reviewed all available patient's medical records as well as previous therapies as referenced under HPI. Patient symptoms have progressed significantly since last visit, therefore, we will move forward with diagnostic work-up. Therefore, I have proposed the following plan:  1. Diagnostic studies:  A. We may repeat MRI of the lumbar spine without contrast Vs lumbar myelogram followed by CT post myelogram depending on patient's response to Tx  B. Flexion and extension X-rays of the lumbar spine  C. X-rays of the left foot, full views  D. Hemoglobin A1C  2. Pharmacological measures: Reviewed. Discussed. Patient has failed numerous pharmacological trials and has declined pharmacological measures at this time except for;  A. Patient takes Tylenol  and NSAIDs as needed  C. Resume Rheumate one tablet twice daily  D. Start pyridoxine 100 mg one tablet by mouth three times daily, take for 90 days  E. Trial with Cymbalta 20 mg q.day with further titration. Patient failed trials with gabapentinoids   F. Continue prilocaine 2%, lidocaine 10%, imipramine 3%, capsaicin 0.001% and mannitol 20%  cream, apply 1 to 2 grams of cream to the affected areas every 4 to 6 hours as needed  3. Interventional pain management measures:  Patient will be scheduled for diagnostic and therapeutic left L4-L5 and left L5-S1 transforaminal epidural steroid injections. We may repeat another epidural depending on patient's outcome.  He still has a great deal of mechanical lower back pain.  We have discussed the possibility of repeating one set of diagnostic bilateral lumbar medial branch blocks at L2, L3, L4; for bilateral lumbar facet joints at L3-4 L4-L5 to confirm the origin of his chronic pain.  If patient experiences more than 80% pain relief along with significant improvement in the range of motion of the lumbar spine, then, we will move forward with repeat bilateral lumbar medial branch rhizotomies. We have also discussed at length the possibility of neurosurgical consultation, which patient has declined.  Patient is not interested in prospects of lumbar fusion.  He has seen Dr. Gonzalez in the past and has declined this.  Therefore, we have discussed the possibility of a spinal cord stimulator trial.    4. Long-term rehabilitation efforts:  A. The patient does not have a history of falls. I did complete a risk assessment for falls.   B. Patient will start a comprehensive physical therapy program for water therapy, therapeutic exercise, upper body strengthening/posture correction, core strengthening, gait and balance training, neurodynamics, myofascial release, cupping and dry needling once pain is under control  C. Continue TENS unit/interferential unit  D. Continue contrast therapy:  Apply ice-packs for 15-20 minutes, followed by heating pads for 15-20 minutes to affected area   5. The patient and his family have been instructed to contact my office with any questions or difficulties. The patient understands the plan and agrees to proceed accordingly.    I spent more than 45 minutes with the patient and his wife of which more than 50% was spent counseling the patient     Patient Care Team:  Jamarcus Harmon MD as PCP - General (General Surgery)  Coleman Fung MD as Consulting Physician (Pain Medicine)  Nazario Leal MD as Consulting Physician (Orthopedic Surgery)  Juan Gonzalez MD (Inactive) as Consulting Physician (Neurosurgery)  Gal Bueno MD as Consulting Physician (Neurology)     No orders of the defined types were placed in this encounter.        No future appointments.      Coleman Fung MD     EMR Dragon/Transcription disclaimer:  Much of this encounter note is an electronic transcription of spoken language to printed text. Electronic transcription of spoken language may permit erroneous, or at times, nonsensical words or phrases to be inadvertently transcribed. Although I have reviewed the note for such errors, some may still exist.

## 2021-03-02 ENCOUNTER — HOSPITAL ENCOUNTER (OUTPATIENT)
Dept: GENERAL RADIOLOGY | Facility: HOSPITAL | Age: 71
Discharge: HOME | End: 2021-03-02

## 2021-03-02 ENCOUNTER — LAB (OUTPATIENT)
Dept: LAB | Facility: HOSPITAL | Age: 71
End: 2021-03-02

## 2021-03-02 ENCOUNTER — OFFICE VISIT (OUTPATIENT)
Dept: PAIN MEDICINE | Facility: CLINIC | Age: 71
End: 2021-03-02

## 2021-03-02 VITALS
HEART RATE: 55 BPM | HEIGHT: 72 IN | WEIGHT: 189.8 LBS | BODY MASS INDEX: 25.71 KG/M2 | TEMPERATURE: 98.1 F | SYSTOLIC BLOOD PRESSURE: 126 MMHG | OXYGEN SATURATION: 98 % | RESPIRATION RATE: 16 BRPM | DIASTOLIC BLOOD PRESSURE: 76 MMHG

## 2021-03-02 DIAGNOSIS — Z96.651 S/P TOTAL KNEE ARTHROPLASTY, RIGHT: ICD-10-CM

## 2021-03-02 DIAGNOSIS — M79.672 LEFT FOOT PAIN: ICD-10-CM

## 2021-03-02 DIAGNOSIS — M43.00 PARS DEFECT WITH SPONDYLOLISTHESIS: ICD-10-CM

## 2021-03-02 DIAGNOSIS — M43.10 PARS DEFECT WITH SPONDYLOLISTHESIS: ICD-10-CM

## 2021-03-02 DIAGNOSIS — M43.17 SPONDYLOLISTHESIS OF LUMBOSACRAL REGION: ICD-10-CM

## 2021-03-02 DIAGNOSIS — M47.812 CERVICAL SPONDYLOSIS WITHOUT MYELOPATHY: ICD-10-CM

## 2021-03-02 DIAGNOSIS — R73.03 PREDIABETES: ICD-10-CM

## 2021-03-02 DIAGNOSIS — M43.17 SPONDYLOLISTHESIS OF LUMBOSACRAL REGION: Primary | ICD-10-CM

## 2021-03-02 DIAGNOSIS — M79.18 MYOFASCIAL PAIN: ICD-10-CM

## 2021-03-02 DIAGNOSIS — M47.816 SPONDYLOSIS OF LUMBAR REGION WITHOUT MYELOPATHY OR RADICULOPATHY: ICD-10-CM

## 2021-03-02 DIAGNOSIS — Z72.0 TOBACCO USE: Primary | ICD-10-CM

## 2021-03-02 DIAGNOSIS — M48.062 LUMBAR STENOSIS WITH NEUROGENIC CLAUDICATION: ICD-10-CM

## 2021-03-02 LAB — HBA1C MFR BLD: 5.4 % (ref 4.8–5.6)

## 2021-03-02 PROCEDURE — 73630 X-RAY EXAM OF FOOT: CPT

## 2021-03-02 PROCEDURE — 72114 X-RAY EXAM L-S SPINE BENDING: CPT

## 2021-03-02 PROCEDURE — 36415 COLL VENOUS BLD VENIPUNCTURE: CPT

## 2021-03-02 PROCEDURE — 99215 OFFICE O/P EST HI 40 MIN: CPT | Performed by: ANESTHESIOLOGY

## 2021-03-02 PROCEDURE — 83036 HEMOGLOBIN GLYCOSYLATED A1C: CPT

## 2021-03-02 RX ORDER — ME-TETRAHYDROFOLATE/B12/HRB236 1-1-500 MG
1 CAPSULE ORAL DAILY
Qty: 90 CAPSULE | Refills: 1 | Status: SHIPPED | OUTPATIENT
Start: 2021-03-02 | End: 2022-01-03 | Stop reason: SDUPTHER

## 2021-03-02 RX ORDER — ME-TETRAHYDROFOLATE/B12/HRB236 1-1-500 MG
CAPSULE ORAL
COMMUNITY
End: 2021-06-30 | Stop reason: SDUPTHER

## 2021-03-02 RX ORDER — DULOXETIN HYDROCHLORIDE 20 MG/1
20 CAPSULE, DELAYED RELEASE ORAL DAILY
Qty: 30 CAPSULE | Refills: 0 | Status: SHIPPED | OUTPATIENT
Start: 2021-03-02 | End: 2021-06-08

## 2021-03-02 RX ORDER — CHOLECALCIFEROL (VITAMIN D3) 125 MCG
5 CAPSULE ORAL
COMMUNITY

## 2021-03-02 RX ORDER — MULTIVITAMIN WITH IRON
100 TABLET ORAL DAILY
Qty: 90 TABLET | Refills: 0 | Status: SHIPPED | OUTPATIENT
Start: 2021-03-02 | End: 2021-06-30

## 2021-03-24 ENCOUNTER — OUTSIDE FACILITY SERVICE (OUTPATIENT)
Dept: PAIN MEDICINE | Facility: CLINIC | Age: 71
End: 2021-03-24

## 2021-03-24 PROCEDURE — 64483 NJX AA&/STRD TFRM EPI L/S 1: CPT | Performed by: ANESTHESIOLOGY

## 2021-03-24 PROCEDURE — 64484 NJX AA&/STRD TFRM EPI L/S EA: CPT | Performed by: ANESTHESIOLOGY

## 2021-03-25 ENCOUNTER — TELEPHONE (OUTPATIENT)
Dept: PAIN MEDICINE | Facility: CLINIC | Age: 71
End: 2021-03-25

## 2021-03-25 NOTE — TELEPHONE ENCOUNTER
Patient is doing well and understands plan of care.  They changed their follow up to see Dr. Fung. Wanted to let us know it is not because of Maritza, but they are comfortable with Antonieta.

## 2021-05-15 NOTE — PROGRESS NOTES
"Chief Complaint: \"Pain in my lower back, left hip and left leg to my left foot.\"        History of Present Illness:   Patient: Mr. Yang Haji, 70 y.o. male   Reason for Referral: Consultation for intractable chronic lower and neck pain.     PROBLEM #1: Lower Back and Left Lower Extremity  Pain History: Patient reports a several year history of lower back and left lower extremity pain, which began without incident.   Patient was last seen on March 20, 2021 when he underwent diagnostic and therapeutic left L4-L5 and left L5-S1 transforaminal epidural steroid injections from which he experienced about 50% pain relief and functional improvement that only lasted for a few days. During his last visit, we discussed repeating an MRI of the lumbar spine without contrast versus lumbar myelogram followed by CT postmyelogram depending on patient's response to treatment. He did not start a trial with Cymbalta 20 mg despite advice.  Pain Description: Constant pain with intermittent exacerbation described as burning, sharp, and tingling sensation  Radiation of pain: The pain radiates from the lumbar region into the posterior aspect of the left hip, the left thigh, left calf, and into the dorsal aspect of the left foot.  The hip pain is more severe than the lower back pain  Pain intensity today: 9/10  Average pain intensity last week: 9/10  Pain intensity ranges from 7/10 to 10/10  Aggravating factors: Pain increases with standing longer than 2 to 5 minutes and walking   Alleviating factors: Pain decreases with sitting down or lying down  Associated Symptoms:  Patient reports pain, tingling, numbness, weakness in the left lower extremity.  Patient reports some low grade symptoms on his right-side  Patient denies any new bladder or bowel problems  Patient reports difficulty with his balance but denies recent falls     PROBLEM #2: Neck Pain  Pain History: Patient reports a more than 40-year history of progressive posterior " cervical pain, which started after respiratory injuries.  Pain Description: Constant pain with intermittent exacerbation described as a sharp sensation  Radiation of pain: None  Pain intensity today: 3/10 (at rest), 7/10 (with rotation of the cervical spine)  Average pain intensity last week: 6/10  Pain intensity ranges from 3/10 to 10/10  Aggravating factors: Pain increases with rotation of the cervical spine  Alleviating factors: Pain decreases with rest  Associated Symptoms:  Patient denies pain, numbness, or weakness in the upper extremities    Review of previous therapies and additional medical records:  Mr. Yang Haji has already failed the following measures, including:  Conservative Measures: Oral analgesics, TENS unit, supervised exercise program, ice, heat, physical therapy   Interventional Pain Management Measures:  03/20/2021: Left L4-L5 and left L5-S1 transforaminal epidural steroid injections  8/15/2018: Diagnostic and therapeutic left L4-L5 and left L5-S1 transforaminal epidural steroid injections  02/2015: Bilateral lumbar medial branch rhizotomies L2, L3, L4, L5; for bilateral lumbar facet joints at L3-L4, L4-L5, and L5-S1 with pain relief that lasted for almost 3 years  Surgical Measures: No history of previous lumbar spine or hip surgery.  History of right total knee arthroplasty on 12/17/2017  Mr. Yang Haji underwent neurosurgical consultation with Dr. Juan Gonzalez a few years ago and was found to be a potential surgical candidate  Mr. Yang Haji presents with significant comorbidities including diffuse osteoarthritis, chronic pain  In terms of current analgesics, Mr. Mendoza takes: Tylenol/NSAIDs  I have reviewed [facility] report # [id number] (no CS) consistent with medication reconciliation      Global Pain Scale 07-31 2018 03-02 2021 05-18 2021             Pain  21  20 20             Feelings  12    0 12              Clinical outcomes  18  20 20              Activities  10  20  20              GPS Total:  61  60 72                Review of New Diagnostic Studies:     HbA1C: 5.4% (NL)  X-rays of the lumbar spine including flexion-extension films on 3/2/2021.  I have reviewed the images and the radiology report.  Diffuse spondylosis with advanced facet hypertrophy.  Mild retrolisthesis of L1 on L2 and L2 on L3.  Grade 1 anterolisthesis of L5 on S1.  Alignment remains unchanged in flexion and extension.  Degenerative changes are seen in the sacroiliac joints  Left foot x-rays on 3/2/2021.  I have reviewed the images and the radiology report.  There are degenerative changes in the midfoot and in the interphalangeal joints    Review of Previous Diagnostic Studies:     EMG/NCV of the lower extremities 8/17/2018  Chronic left L5 radiculopathy  Mild left peroneal neuropathy at the knee  Mild peripheral polyneuropathy  MRI of the lumbar spine without contrast 8/3/2018.  I have reviewed the images.  The conus ends at L1 and has an unremarkable appearance.  Vertebral body heights are maintained.  There is mild retrolisthesis of L2 on L3.  There is grade 2 anterolisthesis of L5 on S1 with bilateral L5 pars defects.  Axial imaging:  T12-L1: No significant canal or foraminal stenosis  L1-L2: Diffuse disc bulge, facet arthropathy.  No significant canal stenosis.  Mild foraminal stenosis  L2-L3: Disc bulge, facet arthropathy.  Severe right and moderate left foraminal stenosis.  Moderate spinal canal stenosis  L3-L4: Disc bulge, facet arthropathy.  Severe bilateral neuroforaminal stenosis.  Severe left and moderate right subarticular zone stenosis.  Moderate central canal stenosis  L4-L5: Disc bulge, facet arthropathy.  No significant canal stenosis.  Severe bilateral neuroforaminal stenosis  L5-S1: Mild central canal stenosis.  Severe left and moderate right neuroforaminal stenosis.  Advanced lumbar facet arthropathy  Flexion and extension x-rays of the lumbar spine 7/31/2018.  I have reviewed the images.   Stable retrolisthesis of L2 on L3 during flexion and extension.  The anterolisthesis at L5-S1 is slightly more pronounced on flexion than extension  Bone Scan, 01/22/2014: Normal bone mineral density in both hips and in the lumbar spine.     Review of Systems   Endocrine: Positive for cold intolerance.   Genitourinary: Positive for urgency.   Musculoskeletal: Positive for arthralgias, back pain, gait problem, myalgias, neck pain and neck stiffness.   Neurological: Positive for numbness.   Psychiatric/Behavioral: Positive for sleep disturbance.   All other systems reviewed and are negative.        Patient Active Problem List   Diagnosis   • Primary osteoarthritis of right knee   • S/P total knee arthroplasty, right   • Tobacco abuse   • Spondylolisthesis of lumbosacral region   • Spondylosis of lumbar region without myelopathy or radiculopathy   • Pars defect with spondylolisthesis   • Myofascial pain   • Cervical spondylosis without myelopathy   • Abnormal stress test   • Lumbar stenosis with neurogenic claudication   • Left foot pain       Past Medical History:   Diagnosis Date   • Adult BMI 28.0-28.9 kg/sq m    • Back pain    • Cervical spondylosis without myelopathy    • Chest pain    • Chronic pain of right knee    • Palpitations    • Pars defect with spondylolisthesis    • PONV (postoperative nausea and vomiting)    • Prediabetes    • Primary osteoarthritis of right knee    • SOB (shortness of breath)    • Spondylolisthesis of lumbosacral region    • Spondylosis of lumbar region without myelopathy or radiculopathy    • Staph infection 1974    right knee, treated with antibiotics    • Tobacco abuse    • Wears glasses          Past Surgical History:   Procedure Laterality Date   • CARDIAC CATHETERIZATION N/A 1/14/2021    Procedure: Left Heart Cath;  Surgeon: Nazario Salmon MD;  Location: [facility] CATH INVASIVE LOCATION;  Service: Cardiology;  Laterality: N/A;   • COLONOSCOPY  10/2017   • KNEE SURGERY Right  1974    debridement d/t staph infection   • LASIK Bilateral    • MEDIAL COLLATERAL LIGAMENT REPAIR, KNEE Right 1974   • RHIZOTOMY     • TOTAL KNEE ARTHROPLASTY Right 12/12/2017    Procedure: TOTAL KNEE ARTHROPLASTY RIGHT;  Surgeon: Nazario Leal MD;  Location: [facility];  Service:          Family History   Problem Relation Age of Onset   • Diabetes Father    • Heart disease Father    • Hypertension Father    • Osteoarthritis Father    • Stroke Father    • Heart attack Father    • Cancer Mother          Social History     Socioeconomic History   • Marital status:      Spouse name: Not on file   • Number of children: Not on file   • Years of education: Not on file   • Highest education level: Not on file   Tobacco Use   • Smoking status: Never Smoker   • Smokeless tobacco: Current User     Types: Chew   • Tobacco comment: Less than 1/2 can of smokeless tobacco per week   Substance and Sexual Activity   • Drug use: No   • Sexual activity: Defer        Current Outpatient Medications:   •  aspirin 81 MG EC tablet, Take 1 tablet by mouth Daily. Resume in 1 month (Patient taking differently: Take 81 mg by mouth Daily.), Disp: , Rfl:   •  Cholecalciferol (VITAMIN D3 PO), Take 1 capsule by mouth Daily., Disp: , Rfl:   •  Dietary Management Product (Rheumate) capsule, Take  by mouth., Disp: , Rfl:   •  Dietary Management Product (Rheumate) capsule, Take 1 capsule by mouth Daily., Disp: 90 capsule, Rfl: 1  •  DULoxetine (CYMBALTA) 20 MG capsule, Take 1 capsule by mouth Daily., Disp: 30 capsule, Rfl: 0  •  Gel Base gel, 2 g 4 (Four) Times a Day. prilocaine 2%, lidocaine 10%, imipramine 3%, capsaicin 0.001% and mannitol 20%, Disp: 240 g, Rfl: 5  •  melatonin 5 MG tablet tablet, Take 5 mg by mouth., Disp: , Rfl:   •  Multiple Vitamins-Minerals (ZINC PO), Take 1 tablet by mouth Daily., Disp: , Rfl:   •  pantoprazole (PROTONIX) 40 MG EC tablet, Take 40 mg by mouth Daily., Disp: , Rfl:   •  rosuvastatin (CRESTOR) 10 MG  "tablet, Take 1 tablet by mouth Daily., Disp: 30 tablet, Rfl: 11  •  vitamin B-6 (PYRIDOXINE) 100 MG tablet, Take 1 tablet by mouth Daily., Disp: 90 tablet, Rfl: 0      No Known Allergies      /73 (BP Location: Left arm, Patient Position: Sitting, Cuff Size: Adult)   Pulse 59   Temp 96.2 °F (35.7 °C)   Ht 185.4 cm (73\")   Wt 85.5 kg (188 lb 6.4 oz)   SpO2 98%   BMI 24.86 kg/m²       Physical Exam: Findings of today's examination;  Constitutional: Patient appears well-developed, well-nourished, younger than chronological age  HEENT: Head normocephalic and atraumatic.  Eyes: Conjunctivae and lids are normal.  Pupils: Equal round and reactive to light  Neck: Supple, no LAD, no JVD, trachea midline  Lungs: Clear to auscultation, good air entrance bilaterally  Heart: Regular rhythm, S1, S2, no murmurs.  Peripheral vascular exam pulses 2+ throughout.  No edema  Musculoskeletal:  Gait and station: Normal gait  Cervical spine: Passive and active range of motion of the cervical spine are limited secondary to pain.  Extension, lateral flexion, and rotation of cervical spine increased and reproduced neck pain.  Cervical facet joint line maneuvers are positive  Muscles: Presence of active trigger points of the levator scapula and trapezius bilaterally  Shoulders: Active and passive range of motion of the glenohumeral joints is almost complete and without significant pain.  Rotator cuff strength is 5/5  Lumbar spine: Passive and active range of motion of the lumbar spine are decreased secondary to pain.  Extension, rotation, lateral flexion of the lumbar spine increased reproduce pain.  Lumbar facet joint line maneuvers are positive  Puneet test, Gaenslen's tests, SI compression test: Negative  Piriformis maneuvers: Negative  Hips: The range of motion of the hip joints is slightly reduced, symmetrical, without pain  Examination of the left foot is unrevealing.  There is no pain upon lateral compression of the " forefoot or palpation of the plantar or dorsal aspects of the foot.  Tarsometatarsal phalangeal joints, no pain.  Peripheral pulses intact.  Neurological:  Patient is alert, oriented to person, place, and time.  Speech: Normal  Cortical function: Normal mental status  Cranial nerves II through XII: Intact  Reflex scores:   Right brachioradialis: 2+  Left brachioradialis: 2+   Right biceps: 2+  Left biceps: 2+  Right triceps: 2+  Left triceps: 2+   Right patellar: 1+   Left patellar: 1+   Right Achilles: 1+   Left Achilles: 1+  Motor strength: 5/5  Motor tone: Normal  Involuntary movements: None  Spurling sign: Negative neck tornado test: Negative Lhermitte sign: Negative.  Long tract signs: Negative.  Straight leg raising test: Negative on the right, positive on the left at 40 degrees. Femoral stretch sign: Negative  Sensory exam: Intact to light touch, pain, temperature, vibration, proprioception  Coordination: Normal finger-to-nose and heel-to-dowling.  Romberg sign negative  Skin is cutaneous tissue: Skin is warm and intact.  No rashes.  No cyanosis  Psychiatric: Judgment and insight: Normal.  Recent and remote memory intact.  Mood and affect: Normal    ASSESSMENT:   1. Lumbar stenosis with neurogenic claudication    2. Spondylolisthesis of lumbosacral region    3. Pars defect with spondylolisthesis    4. Spondylosis of lumbar region without myelopathy or radiculopathy    5. Cervical spondylosis without myelopathy    6. Myofascial pain    7. S/P total knee arthroplasty, right         PLAN/MEDICAL DECISION MAKING:  Mr. Yang Haji presents with a longstanding history of multifactorial chronic lower back pain. MRI from 2018 revealed advanced multilevel degenerative changes particularly of facet hypertrophy, degenerative disc disease previous degrees of canal and neuroforaminal stenosis.  L3-L4: Moderate canal stenosis with severe bilateral foraminal stenosis.  L4-5 severe bilateral foraminal stenosis.  L5-S1,  grade 2 spondylolisthesis with severe left and moderate right neuroforaminal stenosis.  Flexion-extension x-rays of the lumbar spine revealed mild instability with spondylolisthesis at L5-S1 during flexion.  Electrodiagnostic studies of the lower extremities from 2018 revealed chronic left L5 radiculopathy.  Patient has previously failed to obtain pain relief conservative measures.  Patient has responded well to interventional pain management measures, as referenced in the HPI.  I have a lengthy conversation with Mr. Haji  regarding his cramping condition and potential therapeutic options including risks, benefits, alternative therapies, to name a few.  I have reviewed all available patient's medical records as well as previous therapies as referenced under HPI. Patient symptoms have progressed significantly since last visit, therefore, we will move forward with diagnostic work-up. Therefore, I have proposed the following plan:  1. Diagnostic studies:   A. MRI of the lumbar spine without contrast   B. We may consider lumbar myelogram followed by CT post myelogram depending on MRI findings  2. Pharmacological measures: Reviewed. Discussed. Patient has failed numerous pharmacological trials and has declined pharmacological measures at this time except for;  A. Patient takes Tylenol and NSAIDs as needed  C. Continue Rheumate one tablet twice daily  D. Start Cymbalta 20 mg q.day with further titration. Patient failed trials with gabapentinoids   E. Continue prilocaine 2%, lidocaine 10%, imipramine 3%, capsaicin 0.001% and mannitol 20%  cream, apply 1 to 2 grams of cream to the affected areas every 4 to 6 hours as needed  3. Interventional pain management measures:  None indicated at this time. We will review his MRI first. We have discussed the possibility of transforaminal epidural steroid injections versus one set of diagnostic bilateral lumbar medial branch blocks at L2, L3, L4; for bilateral lumbar facet joints at  L3-4 L4-L5 to confirm the origin of his chronic pain.  If patient experiences more than 80% pain relief along with significant improvement in the range of motion of the lumbar spine, then, we will move forward with repeat bilateral lumbar medial branch rhizotomies. We have also discussed at length the possibility of neurosurgical consultation, which patient has declined at this time. Patient is not interested in prospects of lumbar fusion.  He has seen Dr. Gonzalez in the past and has declined this.  Therefore, we have discussed the possibility of a spinal cord stimulator trial, MILD, to name a few.    4. Long-term rehabilitation efforts:  A. The patient does not have a history of falls. I did complete a risk assessment for falls.   B. Patient will start a comprehensive physical therapy program for water therapy, therapeutic exercise, upper body strengthening/posture correction, core strengthening, gait and balance training, neurodynamics, myofascial release, cupping and dry needling once pain is under control  C. Continue TENS unit/interferential unit  D. Continue contrast therapy: Apply ice-packs for 15-20 minutes, followed by heating pads for 15-20 minutes to affected area   E. Referral to Dr Castrejon for psychological clearance for SCS  5. The patient and his family have been instructed to contact my office with any questions or difficulties. The patient understands the plan and agrees to proceed accordingly.         Patient Care Team:  Jamarcus Harmon MD as PCP - General (General Surgery)  Coleman Fung MD as Consulting Physician (Pain Medicine)  Nazario Leal MD as Consulting Physician (Orthopedic Surgery)  Juan Gonzalez MD (Inactive) as Consulting Physician (Neurosurgery)  Gal Bueno MD as Consulting Physician (Neurology)     No orders of the defined types were placed in this encounter.        No future appointments.      Coleman Fung MD     EMR Dragon/Transcription disclaimer:  Much of this  encounter note is an electronic transcription of spoken language to printed text. Electronic transcription of spoken language may permit erroneous, or at times, nonsensical words or phrases to be inadvertently transcribed. Although I have reviewed the note for such errors, some may still exist.

## 2021-05-18 ENCOUNTER — OFFICE VISIT (OUTPATIENT)
Dept: PAIN MEDICINE | Facility: CLINIC | Age: 71
End: 2021-05-18

## 2021-05-18 ENCOUNTER — HOSPITAL ENCOUNTER (OUTPATIENT)
Dept: MRI IMAGING | Facility: HOSPITAL | Age: 71
Discharge: HOME | End: 2021-05-18
Admitting: ANESTHESIOLOGY

## 2021-05-18 VITALS
TEMPERATURE: 96.2 F | WEIGHT: 188.4 LBS | OXYGEN SATURATION: 98 % | DIASTOLIC BLOOD PRESSURE: 73 MMHG | HEART RATE: 59 BPM | HEIGHT: 73 IN | SYSTOLIC BLOOD PRESSURE: 120 MMHG | BODY MASS INDEX: 24.97 KG/M2

## 2021-05-18 DIAGNOSIS — M48.062 LUMBAR STENOSIS WITH NEUROGENIC CLAUDICATION: ICD-10-CM

## 2021-05-18 DIAGNOSIS — M47.816 SPONDYLOSIS OF LUMBAR REGION WITHOUT MYELOPATHY OR RADICULOPATHY: ICD-10-CM

## 2021-05-18 DIAGNOSIS — M48.062 LUMBAR STENOSIS WITH NEUROGENIC CLAUDICATION: Primary | ICD-10-CM

## 2021-05-18 DIAGNOSIS — M43.00 PARS DEFECT WITH SPONDYLOLISTHESIS: ICD-10-CM

## 2021-05-18 DIAGNOSIS — Z96.651 S/P TOTAL KNEE ARTHROPLASTY, RIGHT: ICD-10-CM

## 2021-05-18 DIAGNOSIS — M43.10 PARS DEFECT WITH SPONDYLOLISTHESIS: ICD-10-CM

## 2021-05-18 DIAGNOSIS — M79.18 MYOFASCIAL PAIN: ICD-10-CM

## 2021-05-18 DIAGNOSIS — M47.812 CERVICAL SPONDYLOSIS WITHOUT MYELOPATHY: ICD-10-CM

## 2021-05-18 DIAGNOSIS — Z00.8 PRE-SURGICAL PSYCHOLOGICAL ASSESSMENT, ENCOUNTER FOR: ICD-10-CM

## 2021-05-18 DIAGNOSIS — M43.17 SPONDYLOLISTHESIS OF LUMBOSACRAL REGION: ICD-10-CM

## 2021-05-18 PROCEDURE — 99214 OFFICE O/P EST MOD 30 MIN: CPT | Performed by: ANESTHESIOLOGY

## 2021-05-18 PROCEDURE — 72148 MRI LUMBAR SPINE W/O DYE: CPT

## 2021-05-18 RX ORDER — DULOXETIN HYDROCHLORIDE 20 MG/1
20 CAPSULE, DELAYED RELEASE ORAL DAILY
Qty: 30 CAPSULE | Refills: 0 | Status: SHIPPED | OUTPATIENT
Start: 2021-05-18 | End: 2021-08-18

## 2021-05-21 DIAGNOSIS — M43.10 PARS DEFECT WITH SPONDYLOLISTHESIS: ICD-10-CM

## 2021-05-21 DIAGNOSIS — M43.17 SPONDYLOLISTHESIS OF LUMBOSACRAL REGION: ICD-10-CM

## 2021-05-21 DIAGNOSIS — M48.062 LUMBAR STENOSIS WITH NEUROGENIC CLAUDICATION: Primary | ICD-10-CM

## 2021-05-21 DIAGNOSIS — M47.816 SPONDYLOSIS OF LUMBAR REGION WITHOUT MYELOPATHY OR RADICULOPATHY: ICD-10-CM

## 2021-05-21 DIAGNOSIS — M43.00 PARS DEFECT WITH SPONDYLOLISTHESIS: ICD-10-CM

## 2021-06-08 DIAGNOSIS — M43.17 SPONDYLOLISTHESIS OF LUMBOSACRAL REGION: ICD-10-CM

## 2021-06-08 RX ORDER — DULOXETIN HYDROCHLORIDE 20 MG/1
CAPSULE, DELAYED RELEASE ORAL
Qty: 30 CAPSULE | Refills: 0 | Status: SHIPPED | OUTPATIENT
Start: 2021-06-08 | End: 2021-06-30 | Stop reason: SDUPTHER

## 2021-06-30 ENCOUNTER — OFFICE VISIT (OUTPATIENT)
Dept: NEUROSURGERY | Facility: CLINIC | Age: 71
End: 2021-06-30

## 2021-06-30 VITALS — BODY MASS INDEX: 25.18 KG/M2 | HEIGHT: 73 IN | TEMPERATURE: 97.3 F | WEIGHT: 190 LBS

## 2021-06-30 DIAGNOSIS — M43.16 SPONDYLOLISTHESIS OF LUMBAR REGION: ICD-10-CM

## 2021-06-30 DIAGNOSIS — M54.16 LUMBAR RADICULOPATHY: ICD-10-CM

## 2021-06-30 DIAGNOSIS — M54.9 MECHANICAL BACK PAIN: Primary | ICD-10-CM

## 2021-06-30 PROCEDURE — 99204 OFFICE O/P NEW MOD 45 MIN: CPT | Performed by: NEUROLOGICAL SURGERY

## 2021-06-30 NOTE — PROGRESS NOTES
Patient: Yang Haji  : 1950    Primary Care Provider: Jamarcus Harmon MD    Requesting Provider: As above        History    Chief Complaint: Chronic low back pain with left hip and leg pain.    History of Present Illness: Mr. Haji is a 70-year-old retired  and  who has a 40-year history of back trouble.  Symptoms have progressed over time.  He has also experienced left hip pain that extends down into the lateral aspects of his left leg and into the top of his left foot.  At this point the leg and foot bother him more than his back.  Symptoms are worse with walking and standing and improved with sitting or lying down.  He has undergone therapy and injections in the past.  He has no right leg symptoms.  He denies bowel or bladder dysfunction.    Review of Systems   Constitutional: Negative for activity change, appetite change, chills, diaphoresis, fatigue, fever and unexpected weight change.   HENT: Positive for hearing loss. Negative for congestion, dental problem, drooling, ear discharge, ear pain, facial swelling, mouth sores, nosebleeds, postnasal drip, rhinorrhea, sinus pressure, sinus pain, sneezing, sore throat, tinnitus, trouble swallowing and voice change.    Eyes: Negative for photophobia, pain, discharge, redness, itching and visual disturbance.   Respiratory: Negative for apnea, cough, choking, chest tightness, shortness of breath, wheezing and stridor.    Cardiovascular: Negative for chest pain, palpitations and leg swelling.   Gastrointestinal: Negative for abdominal distention, abdominal pain, anal bleeding, blood in stool, constipation, diarrhea, nausea, rectal pain and vomiting.   Endocrine: Negative for cold intolerance, heat intolerance, polydipsia, polyphagia and polyuria.   Genitourinary: Negative for decreased urine volume, difficulty urinating, dysuria, enuresis, flank pain, frequency, genital sores, hematuria and urgency.   Musculoskeletal:  "Positive for back pain, gait problem, neck pain and neck stiffness. Negative for arthralgias, joint swelling and myalgias.   Skin: Negative for color change, pallor, rash and wound.   Allergic/Immunologic: Positive for environmental allergies. Negative for food allergies and immunocompromised state.   Neurological: Positive for numbness. Negative for dizziness, tremors, seizures, syncope, facial asymmetry, speech difficulty, weakness, light-headedness and headaches.   Hematological: Negative for adenopathy. Bruises/bleeds easily.   Psychiatric/Behavioral: Negative for agitation, behavioral problems, confusion, decreased concentration, dysphoric mood, hallucinations, self-injury, sleep disturbance and suicidal ideas. The patient is not nervous/anxious and is not hyperactive.    All other systems reviewed and are negative.      The patient's past medical history, past surgical history, family history, and social history have been reviewed at length in the electronic medical record.    Physical Exam:   Temp 97.3 °F (36.3 °C)   Ht 185.4 cm (73\")   Wt 86.2 kg (190 lb)   BMI 25.07 kg/m²   CONSTITUTIONAL: Patient is well-nourished, pleasant and appears stated age.  CV: Heart regular rate and rhythm without murmur, rub, or gallop.  PULMONARY: Lungs are clear to ascultation.  MUSCULOSKELETAL:  Straight leg raising is negative.  Puneet's Sign is negative.  ROM in the low back is normal.  Tenderness in the back to palpation is not observed.  NEUROLOGICAL:  Orientation, memory, attention span, language function, and cognition have been examined and are intact.  Strength is intact in the lower extremities to direct testing.  Muscle tone is normal throughout.  Station and gait are normal.  Sensation is intact to light touch testing throughout.  Deep tendon reflexes are difficult to elicit throughout.  Coordination is intact.      Medical Decision Making    Data Review:   (All imaging studies were personally reviewed unless " stated otherwise)  MRI lumbar spine dated 5/18/2021 demonstrates multilevel degenerative disc disease and diffuse facet arthropathy.  There is a low-grade retrolisthesis of L2 on L3.  There is a prominent grade 1 listhesis of L5 on S1.  There is substantial biforaminal narrowing.  There is recess and foraminal narrowing on the left at L3-4.  There is also significant recess narrowing bilaterally at L4-5 more prominent on the left.    Diagnosis:   1.  Chronic mechanical low back pain.  2.  L5-S1 spondylolisthesis with biforaminal compromise.  3.  Left lower extremity radiculopathy.    Treatment Options:   The patient is having much more leg and back pain at this point.  Definitive treatment would entail L5-S1 decompression with fusion and stabilization.  I would also perform a foraminotomy on the left at L4-5.  The goal of surgery would be to improve his leg symptoms as well as his walking and standing tolerance.  This would be unlikely to completely eradicate his many decade long history of mechanical back pain.  I have explained what the surgery would entail as well as the potential risks, complications, and limitations with both the patient and his daughter.  He is going to consider his options.  I believe that Dr. Fung is also talked to him about a spinal cord stimulator trial.       Diagnosis Plan   1. Mechanical back pain     2. Spondylolisthesis of lumbar region     3. Lumbar radiculopathy         Scribed for Darshan Weber MD by Xochitl Kim [facility] 6/30/2021 15:40 EDT      I, Dr. Weber, personally performed the services described in the documentation, as scribed in my presence, and it is both accurate and complete.

## 2021-07-01 DIAGNOSIS — M48.062 LUMBAR STENOSIS WITH NEUROGENIC CLAUDICATION: Primary | ICD-10-CM

## 2021-07-01 DIAGNOSIS — Z01.818 PREOPERATIVE TESTING: ICD-10-CM

## 2021-07-26 ENCOUNTER — TELEPHONE (OUTPATIENT)
Dept: PAIN MEDICINE | Facility: CLINIC | Age: 71
End: 2021-07-26

## 2021-07-26 NOTE — TELEPHONE ENCOUNTER
Pt called stating that he isn't interest in pursuing pain management at this time. Pt stated he will call back when ready  to r/s.

## 2021-08-18 DIAGNOSIS — M48.062 LUMBAR STENOSIS WITH NEUROGENIC CLAUDICATION: ICD-10-CM

## 2021-08-18 RX ORDER — DULOXETIN HYDROCHLORIDE 20 MG/1
CAPSULE, DELAYED RELEASE ORAL
Qty: 30 CAPSULE | Refills: 0 | Status: SHIPPED | OUTPATIENT
Start: 2021-08-18 | End: 2021-09-20

## 2021-09-20 DIAGNOSIS — M48.062 LUMBAR STENOSIS WITH NEUROGENIC CLAUDICATION: ICD-10-CM

## 2021-09-20 RX ORDER — DULOXETIN HYDROCHLORIDE 20 MG/1
CAPSULE, DELAYED RELEASE ORAL
Qty: 30 CAPSULE | Refills: 0 | Status: SHIPPED | OUTPATIENT
Start: 2021-09-20 | End: 2021-10-25

## 2021-09-29 ENCOUNTER — TELEPHONE (OUTPATIENT)
Dept: PAIN MEDICINE | Facility: CLINIC | Age: 71
End: 2021-09-29

## 2021-09-29 NOTE — TELEPHONE ENCOUNTER
Provider:  DR. ANTONIO BRAXTON  Caller:  RINA FRAGA  Relationship to Patient:  SELF  Pharmacy:   Phone Number:  [phone] CELL (OR CAN TRY HOME PHONE IF NO ANSWER)  Reason for Call:  DR. BRAXTON REFERRED PATIENT TO DR. CORBIN BUT PATIENT DOES NOT WANT TO HAVE SURGERY. PATIENT ASKING IF DR. BRAXTON WILL DO ANOTHER EPIDURAL.  When was the patient last seen:  5/18/21

## 2021-09-30 DIAGNOSIS — M48.062 LUMBAR STENOSIS WITH NEUROGENIC CLAUDICATION: Primary | ICD-10-CM

## 2021-10-04 ENCOUNTER — OUTSIDE FACILITY SERVICE (OUTPATIENT)
Dept: PAIN MEDICINE | Facility: CLINIC | Age: 71
End: 2021-10-04

## 2021-10-04 PROCEDURE — 64483 NJX AA&/STRD TFRM EPI L/S 1: CPT | Performed by: ANESTHESIOLOGY

## 2021-10-04 PROCEDURE — 99152 MOD SED SAME PHYS/QHP 5/>YRS: CPT | Performed by: ANESTHESIOLOGY

## 2021-10-04 PROCEDURE — 64484 NJX AA&/STRD TFRM EPI L/S EA: CPT | Performed by: ANESTHESIOLOGY

## 2021-10-05 ENCOUNTER — TELEPHONE (OUTPATIENT)
Dept: PAIN MEDICINE | Facility: CLINIC | Age: 71
End: 2021-10-05

## 2021-10-05 NOTE — TELEPHONE ENCOUNTER
Spoke with the patient and asked how he was doing after his appointment yesterday. He stated that he doesn't have any issues, concerns or complaints. I advised him of his upcoming appointment day and time, reminder card placed in the mail.

## 2021-10-22 DIAGNOSIS — M48.062 LUMBAR STENOSIS WITH NEUROGENIC CLAUDICATION: ICD-10-CM

## 2021-10-25 RX ORDER — DULOXETIN HYDROCHLORIDE 20 MG/1
CAPSULE, DELAYED RELEASE ORAL
Qty: 30 CAPSULE | Refills: 0 | Status: SHIPPED | OUTPATIENT
Start: 2021-10-25 | End: 2021-11-29

## 2021-11-27 DIAGNOSIS — M48.062 LUMBAR STENOSIS WITH NEUROGENIC CLAUDICATION: ICD-10-CM

## 2021-11-29 RX ORDER — DULOXETIN HYDROCHLORIDE 20 MG/1
CAPSULE, DELAYED RELEASE ORAL
Qty: 30 CAPSULE | Refills: 0 | Status: SHIPPED | OUTPATIENT
Start: 2021-11-29 | End: 2021-12-31

## 2021-12-30 DIAGNOSIS — M48.062 LUMBAR STENOSIS WITH NEUROGENIC CLAUDICATION: ICD-10-CM

## 2021-12-31 RX ORDER — DULOXETIN HYDROCHLORIDE 20 MG/1
CAPSULE, DELAYED RELEASE ORAL
Qty: 30 CAPSULE | Refills: 0 | Status: SHIPPED | OUTPATIENT
Start: 2021-12-31 | End: 2022-03-17

## 2022-01-03 DIAGNOSIS — M43.17 SPONDYLOLISTHESIS OF LUMBOSACRAL REGION: ICD-10-CM

## 2022-01-03 RX ORDER — ME-TETRAHYDROFOLATE/B12/HRB236 1-1-500 MG
1 CAPSULE ORAL DAILY
Qty: 90 CAPSULE | Refills: 1 | Status: SHIPPED | OUTPATIENT
Start: 2022-01-03 | End: 2022-03-30

## 2022-01-11 ENCOUNTER — TELEPHONE (OUTPATIENT)
Dept: PAIN MEDICINE | Facility: CLINIC | Age: 72
End: 2022-01-11

## 2022-01-11 DIAGNOSIS — M25.559 HIP PAIN: ICD-10-CM

## 2022-01-11 DIAGNOSIS — Z00.8 PRE-SURGICAL PSYCHOLOGICAL ASSESSMENT, ENCOUNTER FOR: Primary | ICD-10-CM

## 2022-01-11 DIAGNOSIS — Z01.812 PRE-PROCEDURAL LABORATORY EXAMINATIONS: Primary | ICD-10-CM

## 2022-01-11 NOTE — TELEPHONE ENCOUNTER
I spoke with Mr. Haji about some questions he had regarding the treatment of his chronic pain.  He reports that he did well after his epidural steroid injections in October.  Pain recurred about 4 weeks ago.  Unfortunately, he has been struggling with recurrent pain.  More recently, he started experiencing more lower extremity symptoms rather than lower back pain.  He reports pain that starts in his lumbar region and radiates to both hips and then down the posterior aspect of the left lower extremity into the left foot.  Pain is associated with numbness and weakness as well as significant neurogenic claudication.  Pain is worse with standing or walking  Pain decreases with sitting or lying down.  However, lying down increases his right hip pain.  He reports that the left lower extremity pain is more severe than his back pain.  He is not ready for lumbar surgery.  He is interested in a spinal cord stimulator trial with [facility].   As I had discussed with him before, that he will need psychological clearance and lab work prior to his trial.  In addition, we have discussed conservative measures, which he has already failed.  I will facilitate the referrals.  We will plan on a telephone visit with Maritza or myself to update his history again prior to his spinal cord stimulator trial.  Coming to [place] is difficult for them since they live in [place].  In addition, I will send an order for x-rays of both hips  Patient was very appreciative of the call and verbalized understanding.

## 2022-03-17 DIAGNOSIS — M48.062 LUMBAR STENOSIS WITH NEUROGENIC CLAUDICATION: ICD-10-CM

## 2022-03-17 RX ORDER — DULOXETIN HYDROCHLORIDE 20 MG/1
CAPSULE, DELAYED RELEASE ORAL
Qty: 30 CAPSULE | Refills: 0 | Status: SHIPPED | OUTPATIENT
Start: 2022-03-17 | End: 2022-05-16

## 2022-03-30 DIAGNOSIS — M43.17 SPONDYLOLISTHESIS OF LUMBOSACRAL REGION: ICD-10-CM

## 2022-03-30 RX ORDER — ME-TETRAHYDROFOLATE/B12/HRB236 1-1-500 MG
1 CAPSULE ORAL DAILY
Qty: 90 CAPSULE | Refills: 0 | Status: SHIPPED | OUTPATIENT
Start: 2022-03-30 | End: 2022-09-23

## 2022-05-13 DIAGNOSIS — M48.062 LUMBAR STENOSIS WITH NEUROGENIC CLAUDICATION: ICD-10-CM

## 2022-05-16 RX ORDER — DULOXETIN HYDROCHLORIDE 20 MG/1
CAPSULE, DELAYED RELEASE ORAL
Qty: 30 CAPSULE | Refills: 0 | Status: SHIPPED | OUTPATIENT
Start: 2022-05-16 | End: 2022-06-13

## 2022-06-13 DIAGNOSIS — M48.062 LUMBAR STENOSIS WITH NEUROGENIC CLAUDICATION: ICD-10-CM

## 2022-06-13 RX ORDER — DULOXETIN HYDROCHLORIDE 20 MG/1
CAPSULE, DELAYED RELEASE ORAL
Qty: 30 CAPSULE | Refills: 0 | Status: SHIPPED | OUTPATIENT
Start: 2022-06-13 | End: 2022-07-19

## 2022-06-17 ENCOUNTER — HOSPITAL ENCOUNTER (OUTPATIENT)
Dept: GENERAL RADIOLOGY | Facility: HOSPITAL | Age: 72
Discharge: HOME | End: 2022-06-17
Admitting: ANESTHESIOLOGY

## 2022-06-17 DIAGNOSIS — M25.559 HIP PAIN: ICD-10-CM

## 2022-06-17 PROCEDURE — 73523 X-RAY EXAM HIPS BI 5/> VIEWS: CPT

## 2022-06-17 PROCEDURE — 73523 X-RAY EXAM HIPS BI 5/> VIEWS: CPT | Performed by: RADIOLOGY

## 2022-07-07 ENCOUNTER — TRANSCRIBE ORDERS (OUTPATIENT)
Dept: ADMINISTRATIVE | Facility: HOSPITAL | Age: 72
End: 2022-07-07

## 2022-07-07 DIAGNOSIS — H91.21 SUDDEN IDIOPATHIC HEARING LOSS OF RIGHT EAR, UNSPECIFIED HEARING STATUS ON CONTRALATERAL SIDE: Primary | ICD-10-CM

## 2022-07-12 ENCOUNTER — HOSPITAL ENCOUNTER (OUTPATIENT)
Dept: MRI IMAGING | Facility: HOSPITAL | Age: 72
Discharge: HOME | End: 2022-07-12

## 2022-07-12 DIAGNOSIS — H91.21 SUDDEN IDIOPATHIC HEARING LOSS OF RIGHT EAR, UNSPECIFIED HEARING STATUS ON CONTRALATERAL SIDE: ICD-10-CM

## 2022-07-12 LAB — CREAT BLDA-MCNC: 1.1 MG/DL (ref 0.6–1.3)

## 2022-07-12 PROCEDURE — 70553 MRI BRAIN STEM W/O & W/DYE: CPT | Performed by: RADIOLOGY

## 2022-07-12 PROCEDURE — 70553 MRI BRAIN STEM W/O & W/DYE: CPT

## 2022-07-12 PROCEDURE — 82565 ASSAY OF CREATININE: CPT

## 2022-07-12 PROCEDURE — A9577 INJ MULTIHANCE: HCPCS | Performed by: OTOLARYNGOLOGY

## 2022-07-12 PROCEDURE — 0 GADOBENATE DIMEGLUMINE 529 MG/ML SOLUTION: Performed by: OTOLARYNGOLOGY

## 2022-07-12 RX ADMIN — GADOBENATE DIMEGLUMINE 18 ML: 529 INJECTION, SOLUTION INTRAVENOUS at 14:44

## 2022-07-15 DIAGNOSIS — M48.062 LUMBAR STENOSIS WITH NEUROGENIC CLAUDICATION: ICD-10-CM

## 2022-07-19 RX ORDER — DULOXETIN HYDROCHLORIDE 20 MG/1
CAPSULE, DELAYED RELEASE ORAL
Qty: 30 CAPSULE | Refills: 0 | Status: SHIPPED | OUTPATIENT
Start: 2022-07-19 | End: 2022-10-11

## 2022-09-23 DIAGNOSIS — M43.17 SPONDYLOLISTHESIS OF LUMBOSACRAL REGION: ICD-10-CM

## 2022-09-23 RX ORDER — ME-TETRAHYDROFOLATE/B12/HRB236 1-1-500 MG
CAPSULE ORAL
Qty: 90 CAPSULE | Refills: 0 | Status: SHIPPED | OUTPATIENT
Start: 2022-09-23

## 2022-10-10 DIAGNOSIS — M48.062 LUMBAR STENOSIS WITH NEUROGENIC CLAUDICATION: ICD-10-CM

## 2022-10-11 RX ORDER — DULOXETIN HYDROCHLORIDE 20 MG/1
CAPSULE, DELAYED RELEASE ORAL
Qty: 30 CAPSULE | Refills: 0 | Status: SHIPPED | OUTPATIENT
Start: 2022-10-11 | End: 2022-12-01

## 2022-12-01 DIAGNOSIS — M48.062 LUMBAR STENOSIS WITH NEUROGENIC CLAUDICATION: ICD-10-CM

## 2022-12-01 RX ORDER — DULOXETIN HYDROCHLORIDE 20 MG/1
CAPSULE, DELAYED RELEASE ORAL
Qty: 30 CAPSULE | Refills: 0 | Status: SHIPPED | OUTPATIENT
Start: 2022-12-01 | End: 2023-02-07

## 2023-02-06 DIAGNOSIS — M48.062 LUMBAR STENOSIS WITH NEUROGENIC CLAUDICATION: ICD-10-CM

## 2023-02-07 RX ORDER — DULOXETIN HYDROCHLORIDE 20 MG/1
CAPSULE, DELAYED RELEASE ORAL
Qty: 30 CAPSULE | Refills: 0 | Status: SHIPPED | OUTPATIENT
Start: 2023-02-07

## 2023-03-17 DIAGNOSIS — G89.29 CHRONIC PAIN OF BOTH SHOULDERS: ICD-10-CM

## 2023-03-17 DIAGNOSIS — R26.89 ABNORMALITY OF GAIT DUE TO IMPAIRMENT OF BALANCE: ICD-10-CM

## 2023-03-17 DIAGNOSIS — M25.511 CHRONIC PAIN OF BOTH SHOULDERS: ICD-10-CM

## 2023-03-17 DIAGNOSIS — I73.9 PERIPHERAL VASCULAR DISEASE, UNSPECIFIED: ICD-10-CM

## 2023-03-17 DIAGNOSIS — R42 DIZZINESS OF UNKNOWN ETIOLOGY: ICD-10-CM

## 2023-03-17 DIAGNOSIS — M25.512 CHRONIC PAIN OF BOTH SHOULDERS: ICD-10-CM

## 2023-03-17 DIAGNOSIS — M47.22 CERVICAL SPONDYLOSIS WITH RADICULOPATHY: Primary | ICD-10-CM

## 2023-03-18 ENCOUNTER — HOSPITAL ENCOUNTER (OUTPATIENT)
Dept: GENERAL RADIOLOGY | Facility: HOSPITAL | Age: 73
Discharge: HOME | End: 2023-03-18
Admitting: ANESTHESIOLOGY
Payer: MEDICARE

## 2023-03-18 DIAGNOSIS — M47.22 CERVICAL SPONDYLOSIS WITH RADICULOPATHY: ICD-10-CM

## 2023-03-18 PROCEDURE — 72052 X-RAY EXAM NECK SPINE 6/>VWS: CPT

## 2023-03-20 DIAGNOSIS — M25.511 BILATERAL SHOULDER PAIN, UNSPECIFIED CHRONICITY: Primary | ICD-10-CM

## 2023-03-20 DIAGNOSIS — I73.9 PAD (PERIPHERAL ARTERY DISEASE): ICD-10-CM

## 2023-03-20 DIAGNOSIS — M25.512 BILATERAL SHOULDER PAIN, UNSPECIFIED CHRONICITY: Primary | ICD-10-CM

## 2023-03-21 ENCOUNTER — HOSPITAL ENCOUNTER (OUTPATIENT)
Dept: GENERAL RADIOLOGY | Facility: HOSPITAL | Age: 73
Discharge: HOME | End: 2023-03-21
Payer: MEDICARE

## 2023-03-21 ENCOUNTER — HOSPITAL ENCOUNTER (OUTPATIENT)
Dept: MRI IMAGING | Facility: HOSPITAL | Age: 73
Discharge: HOME | End: 2023-03-21
Payer: MEDICARE

## 2023-03-21 DIAGNOSIS — M25.512 CHRONIC PAIN OF BOTH SHOULDERS: ICD-10-CM

## 2023-03-21 DIAGNOSIS — M47.22 CERVICAL SPONDYLOSIS WITH RADICULOPATHY: ICD-10-CM

## 2023-03-21 DIAGNOSIS — M25.511 CHRONIC PAIN OF BOTH SHOULDERS: ICD-10-CM

## 2023-03-21 DIAGNOSIS — R26.89 ABNORMALITY OF GAIT DUE TO IMPAIRMENT OF BALANCE: ICD-10-CM

## 2023-03-21 DIAGNOSIS — R42 DIZZINESS OF UNKNOWN ETIOLOGY: ICD-10-CM

## 2023-03-21 DIAGNOSIS — G89.29 CHRONIC PAIN OF BOTH SHOULDERS: ICD-10-CM

## 2023-03-21 PROCEDURE — 72141 MRI NECK SPINE W/O DYE: CPT

## 2023-03-21 PROCEDURE — 73030 X-RAY EXAM OF SHOULDER: CPT

## 2023-03-22 ENCOUNTER — HOSPITAL ENCOUNTER (OUTPATIENT)
Dept: CARDIOLOGY | Facility: HOSPITAL | Age: 73
Discharge: HOME | End: 2023-03-22
Payer: MEDICARE

## 2023-03-22 VITALS — BODY MASS INDEX: 25.19 KG/M2 | WEIGHT: 190.04 LBS | HEIGHT: 73 IN

## 2023-03-22 VITALS — WEIGHT: 190.04 LBS | HEIGHT: 73 IN | BODY MASS INDEX: 25.19 KG/M2

## 2023-03-22 DIAGNOSIS — M25.511 BILATERAL SHOULDER PAIN, UNSPECIFIED CHRONICITY: ICD-10-CM

## 2023-03-22 DIAGNOSIS — M47.22 CERVICAL SPONDYLOSIS WITH RADICULOPATHY: ICD-10-CM

## 2023-03-22 DIAGNOSIS — R26.89 ABNORMALITY OF GAIT DUE TO IMPAIRMENT OF BALANCE: ICD-10-CM

## 2023-03-22 DIAGNOSIS — M25.512 BILATERAL SHOULDER PAIN, UNSPECIFIED CHRONICITY: ICD-10-CM

## 2023-03-22 DIAGNOSIS — I73.9 PAD (PERIPHERAL ARTERY DISEASE): ICD-10-CM

## 2023-03-22 DIAGNOSIS — R42 DIZZINESS OF UNKNOWN ETIOLOGY: ICD-10-CM

## 2023-03-22 LAB
BH CV UPPER DUPLEX LEFT AXILLARY ART PSV: 88 CM/S
BH CV UPPER DUPLEX LEFT BRACHIAL ART PSV: 67 CM/S
BH CV UPPER DUPLEX LEFT RADIAL ART PSV: 67.4 CM/S
BH CV UPPER DUPLEX LEFT SUBCLAVIAN  ART PSV: 92 CM/S
BH CV UPPER DUPLEX LEFT ULNAR ARTERY PSV: 51.6 CM/S
BH CV UPPER DUPLEX RIGHT AXILLARY ARTERY PSV: 71 CM/S
BH CV UPPER DUPLEX RIGHT BRACHIAL ART PSV: 79 CM/S
BH CV UPPER DUPLEX RIGHT RADIAL ART PSV: 66 CM/S
BH CV UPPER DUPLEX RIGHT SUBCLAVIAN ART PSV: 90 CM/S
BH CV UPPER DUPLEX RIGHT ULNAR ART PSV: 47.5 CM/S
BH CV XLRA MEAS LEFT DIST CCA EDV: 18.2 CM/SEC
BH CV XLRA MEAS LEFT DIST CCA PSV: 71.5 CM/SEC
BH CV XLRA MEAS LEFT DIST ICA EDV: 29.1 CM/SEC
BH CV XLRA MEAS LEFT DIST ICA PSV: 74.3 CM/SEC
BH CV XLRA MEAS LEFT ICA/CCA RATIO: 0.72
BH CV XLRA MEAS LEFT MID CCA EDV: 21.7 CM/SEC
BH CV XLRA MEAS LEFT MID CCA PSV: 95.6 CM/SEC
BH CV XLRA MEAS LEFT MID ICA EDV: 22 CM/SEC
BH CV XLRA MEAS LEFT MID ICA PSV: 68.4 CM/SEC
BH CV XLRA MEAS LEFT PROX CCA EDV: 25.1 CM/SEC
BH CV XLRA MEAS LEFT PROX CCA PSV: 110 CM/SEC
BH CV XLRA MEAS LEFT PROX ECA EDV: 11 CM/SEC
BH CV XLRA MEAS LEFT PROX ECA PSV: 68.8 CM/SEC
BH CV XLRA MEAS LEFT PROX ICA EDV: 16.1 CM/SEC
BH CV XLRA MEAS LEFT PROX ICA PSV: 55.4 CM/SEC
BH CV XLRA MEAS LEFT PROX SCLA PSV: 91.9 CM/SEC
BH CV XLRA MEAS LEFT PROX SCLA PSV: 96.7 CM/SEC
BH CV XLRA MEAS LEFT VERTEBRAL A EDV: 9.3 CM/SEC
BH CV XLRA MEAS LEFT VERTEBRAL A PSV: 33.6 CM/SEC
BH CV XLRA MEAS RIGHT DIST CCA EDV: 18.8 CM/SEC
BH CV XLRA MEAS RIGHT DIST CCA PSV: 68.6 CM/SEC
BH CV XLRA MEAS RIGHT DIST ICA EDV: 29.1 CM/SEC
BH CV XLRA MEAS RIGHT DIST ICA PSV: 80.9 CM/SEC
BH CV XLRA MEAS RIGHT ICA/CCA RATIO: 0.71
BH CV XLRA MEAS RIGHT MID CCA EDV: 19.9 CM/SEC
BH CV XLRA MEAS RIGHT MID CCA PSV: 100 CM/SEC
BH CV XLRA MEAS RIGHT MID ICA EDV: 25.1 CM/SEC
BH CV XLRA MEAS RIGHT MID ICA PSV: 70.7 CM/SEC
BH CV XLRA MEAS RIGHT PROX CCA EDV: 17.6 CM/SEC
BH CV XLRA MEAS RIGHT PROX CCA PSV: 97.3 CM/SEC
BH CV XLRA MEAS RIGHT PROX ECA EDV: 10.6 CM/SEC
BH CV XLRA MEAS RIGHT PROX ECA PSV: 69.1 CM/SEC
BH CV XLRA MEAS RIGHT PROX ICA EDV: 18.1 CM/SEC
BH CV XLRA MEAS RIGHT PROX ICA PSV: 53.4 CM/SEC
BH CV XLRA MEAS RIGHT PROX SCLA PSV: 88 CM/SEC
BH CV XLRA MEAS RIGHT PROX SCLA PSV: 89.6 CM/SEC
BH CV XLRA MEAS RIGHT VERTEBRAL A EDV: 11.9 CM/SEC
BH CV XLRA MEAS RIGHT VERTEBRAL A PSV: 39 CM/SEC
LEFT ARM BP: NORMAL MMHG
MAXIMAL PREDICTED HEART RATE: 149 BPM
MAXIMAL PREDICTED HEART RATE: 149 BPM
RIGHT ARM BP: NORMAL MMHG
STRESS TARGET HR: 127 BPM
STRESS TARGET HR: 127 BPM

## 2023-03-22 PROCEDURE — 93930 UPPER EXTREMITY STUDY: CPT

## 2023-03-22 PROCEDURE — 93880 EXTRACRANIAL BILAT STUDY: CPT | Performed by: INTERNAL MEDICINE

## 2023-03-22 PROCEDURE — 93880 EXTRACRANIAL BILAT STUDY: CPT

## 2023-03-22 PROCEDURE — 93930 UPPER EXTREMITY STUDY: CPT | Performed by: INTERNAL MEDICINE

## 2023-03-27 ENCOUNTER — TRANSCRIBE ORDERS (OUTPATIENT)
Dept: ADMINISTRATIVE | Facility: HOSPITAL | Age: 73
End: 2023-03-27
Payer: MEDICARE

## 2023-03-27 DIAGNOSIS — H91.22 SUDDEN IDIOPATHIC HEARING LOSS OF LEFT EAR, UNSPECIFIED HEARING STATUS ON CONTRALATERAL SIDE: Primary | ICD-10-CM

## 2023-03-28 ENCOUNTER — HOSPITAL ENCOUNTER (OUTPATIENT)
Dept: MRI IMAGING | Facility: HOSPITAL | Age: 73
Discharge: HOME | End: 2023-03-28
Payer: MEDICARE

## 2023-03-28 DIAGNOSIS — H91.22 SUDDEN IDIOPATHIC HEARING LOSS OF LEFT EAR, UNSPECIFIED HEARING STATUS ON CONTRALATERAL SIDE: ICD-10-CM

## 2023-03-28 LAB — CREAT BLDA-MCNC: 1.2 MG/DL (ref 0.6–1.3)

## 2023-03-28 PROCEDURE — A9577 INJ MULTIHANCE: HCPCS | Performed by: NURSE PRACTITIONER

## 2023-03-28 PROCEDURE — 82565 ASSAY OF CREATININE: CPT

## 2023-03-28 PROCEDURE — 70553 MRI BRAIN STEM W/O & W/DYE: CPT

## 2023-03-28 PROCEDURE — 70553 MRI BRAIN STEM W/O & W/DYE: CPT | Performed by: RADIOLOGY

## 2023-03-28 PROCEDURE — 0 GADOBENATE DIMEGLUMINE 529 MG/ML SOLUTION: Performed by: NURSE PRACTITIONER

## 2023-03-28 RX ADMIN — GADOBENATE DIMEGLUMINE 17 ML: 529 INJECTION, SOLUTION INTRAVENOUS at 15:00

## 2023-04-18 DIAGNOSIS — M79.18 MYOFASCIAL PAIN: ICD-10-CM

## 2023-04-18 DIAGNOSIS — M47.812 CERVICAL SPONDYLOSIS WITHOUT MYELOPATHY: ICD-10-CM

## 2023-04-18 DIAGNOSIS — G89.29 CHRONIC PAIN OF BOTH SHOULDERS: ICD-10-CM

## 2023-04-18 DIAGNOSIS — M25.511 CHRONIC PAIN OF BOTH SHOULDERS: ICD-10-CM

## 2023-04-18 DIAGNOSIS — M25.512 BILATERAL SHOULDER PAIN, UNSPECIFIED CHRONICITY: Primary | ICD-10-CM

## 2023-04-18 DIAGNOSIS — M25.512 CHRONIC PAIN OF BOTH SHOULDERS: ICD-10-CM

## 2023-04-18 DIAGNOSIS — M25.511 BILATERAL SHOULDER PAIN, UNSPECIFIED CHRONICITY: Primary | ICD-10-CM

## 2023-04-25 NOTE — PROGRESS NOTES
"Chief Complaint: \"Pain in my neck, and left shoulder. Intermittent numbness and tingling in my arms left > right.\"      History of Present Illness:  Mr. Yang Haji, 72 y.o. male who returns to clinic for evaluation of posterior cervicalgia, left shoulder pain, extremity pain and paresthesia.  Patient has a history of chronic difficulties with his lower back and left lower extremity.  In the past, he also experienced difficulties with cervicalgia. Yang Haji was last seen on 10/4/2021 when he underwent diagnostic and therapeutic left L4-L5, left L5-S1, left S1 transforaminal epidural steroid injections with local anesthetic and steroids from which he experienced significant pain relief and functional improvement that lasted for about 12 weeks.  By January 2022, he started to experience recurrence of his symptoms and at that point, we discussed prospects of a spinal cord stimulator trial. He had seen Dr. Weber on 6/30/2021 and discussed L5-S1 decompression and fusion along with a foraminotomy on the left at L4-L5. About a month ago, he contacted me because of recurrent cervicalgia and difficulties with his balance.  He complained of severe cervicalgia, left shoulder pain and pain radiating down into the left upper extremity associated with paresthesia.  She also described significant difficulty with his balance.  Patient has previously been worked up by ENT with MRIs of the brain and auditory canal that were unrevealing.  MRI of the cervical spine without contrast on 3/21/2023 revealed diffuse cervical spondylosis. Spinal cord caliber and signal appear preserved.  Multilevel facet hypertrophy and disc osteophyte complexes, most significant at C5-C6 where there is moderate to severe spinal canal stenosis and bilateral neuroforaminal stenosis. Cervical x-rays including flexion and extension on 3/18/2023 revealed diffuse cervical spondylosis without evidence of instability with flexion or extension.  " Uncovertebral joint hypertrophy and facet hypertrophy contributes to osseous neural foraminal stenosis. Bilateral shoulders x-rays on 3/22/2023 revealed left greater than right severe glenohumeral joint degenerative changes. Arterial duplex Doppler of the carotid arteries, vertebral arteries and upper extremities revealed no evidence of hemodynamically significant stenosis. MRI of the brain without and with contrast on 3/28/2023 revealed no contrast-enhancing abnormalities. MRI of the internal auditory canal with and without contrast on 3/20/2023 revealed no acute findings in the internal auditory canal. Pain has progressed in intensity over the past months.  Patient has failed to obtain pain relief with conservative measures including oral analgesics, opioids, topical analgesics, ice, heat, TENs, physical therapy (ongoing), physical therapist directed home exercise program HEP (ongoing), independent exercise program (ongoing), to name a few    PROBLEM #1: Chronic Posterior Cervicalgia  Pain History: Patient reports a more than 40-year history of progressive posterior cervical pain, which started after sport-related injuries.  Over the past months, he started experiencing more severe posterior neck pain associated with left shoulder pain and decreased ROM.  He developed some difficulties with his balance and hearing loss addressed by ENT (normal MRIs).  Pain Description: Constant posterior neck pain and left shoulder pain with intermittent exacerbation, described as aching, dull, sharp, and throbbing sensation.   Radiation of Pain: The pain radiates into the left shoulder  Pain intensity today: 6/10   Average pain intensity last week: 7/10  Pain intensity ranges from: 4/10 to 10/10  Aggravating factors: Pain increases with extension or rotation of the cervical spine, activities (walking), lying on his left side (increases left shoulder pain), lifting or using his left shoulder  Alleviating factors: Pain decreases  with ice, heat, analgesics, changing positions, moving away from the site of pain, sitting down, lying down, standing, walking      Associated Symptoms:   Patient denies pain, but reports some numbness and weakness in the left > right upper extremities.   Patient denies any new bladder or bowel problems.   Patient reports difficulties with his balance but denies recent falls. Patient does not use any gait assistive device   Patient reports bilateral cervicogenic and occipital headaches lasting several hours.  Pain interferes with general activities and affects patient's quality of life  Pain interferes with sleep causing sleep fragmentation   Muscle spasms  Stiffness    PROBLEM #2: Lower Back Pain  Pain History: Patient presents with a several year history of lower back and left lower extremity pain, which began without incident.  He is a former  and has sustained numerous injuries throughout the years.  He has failed trials with numerous medications including gabapentin, muscle relaxers, NSAIDs, to name a few.  In the past, he experienced significant pain relief with lumbar rhizotomies and epidurals.  Pain Description: Constant pain with intermittent exacerbation described as sharp, shooting, burning, throbbing, and tingling sensation   Radiation of pain: The pain radiates from the lumbar region into the left sacroiliac joint region, the left hip  Pain intensity today: 5/10  Average pain intensity last week: 6/10  Pain intensity ranges from 3/10 to 8/10  Aggravating factors: Pain increases with movements of his lower back, and protracted standing or walking (walks 3-4 miles per day)  Alleviating factors: Pain decreases with sitting or lying down   Associated Symptoms:   Patient denies pain, numbness, or weakness in the lower extremities.     Review of previous therapies and additional medical records:  Yang VIDES Raissa has already failed the following measures, including:   Conservative Measures: Oral  CARDIOLOGY FOLLOW UP - Dr. Best    CC: denies cp, sob, and palpitations       PHYSICAL EXAM:  T(C): 36.7 (12-22-20 @ 13:00), Max: 36.8 (12-22-20 @ 00:15)  HR: 87 (12-22-20 @ 13:00) (78 - 89)  BP: 139/84 (12-22-20 @ 13:00) (103/76 - 139/84)  RR: 18 (12-22-20 @ 13:00) (18 - 18)  SpO2: 100% (12-22-20 @ 13:00) (95% - 100%)  Wt(kg): --  I&O's Summary    21 Dec 2020 07:01  -  22 Dec 2020 07:00  --------------------------------------------------------  IN: 2720 mL / OUT: 2500 mL / NET: 220 mL        Appearance: Normal	  Cardiovascular: Normal S1 S2,RRR, No JVD, No murmurs  Respiratory: Lungs clear to auscultation	  Gastrointestinal:  Soft, Non-tender, + BS	  Extremities: Normal range of motion, No clubbing, cyanosis or edema      Home Medications:  aspirin 81 mg oral tablet: 1 tab(s) orally once a day    Note:Recent D/C 3 days ago med on discharge paper but unable to confirm with family. (14 Dec 2020 15:23)  ferrous sulfate 324 mg (65 mg elemental iron) oral tablet: 1 tab(s) orally 3 times a day    Note:Recent D/C 3 days ago med on discharge paper but unable to confirm with family. (14 Dec 2020 15:23)  folic acid 1 mg oral tablet: 1 tab(s) orally once a day    Note:Recent D/C 3 days ago med on discharge paper but unable to confirm with family. No record with pharmacy. (14 Dec 2020 15:24)  metoprolol succinate 50 mg oral tablet, extended release: 1 tab(s) orally once a day    Note:Recent D/C 3 days ago med on discharge paper but unable to confirm with family. (14 Dec 2020 15:24)  Nephplex Rx oral tablet: 1 tab(s) orally once a day    Note:Recent D/C 3 days ago med on discharge paper but unable to confirm with family. (14 Dec 2020 15:32)  nortriptyline 25 mg oral capsule: 1 cap(s) orally once a day (at bedtime)    Note:Recent D/C 3 days ago med on discharge paper but unable to confirm with family. (14 Dec 2020 15:27)  sevelamer carbonate 800 mg oral tablet: 1 tab(s) orally 3 times a day (with meals)    Note:Recent D/C 3 days ago med on discharge paper but unable to confirm with family. (14 Dec 2020 15:32)  Toujeo SoloStar 300 units/mL subcutaneous solution: 60 unit(s) subcutaneous once a day (at bedtime)    Note:Recent D/C 3 days ago med on discharge paper but unable to confirm with family. (14 Dec 2020 15:30)  Vitamin D3 50,000 intl units (1250 mcg) oral capsule: 1 tab(s) orally once a week    Note:no record with  Recent D/C 3 days ago med on discharge paper but unable to confirm with family. (14 Dec 2020 15:31)      MEDICATIONS  (STANDING):  aspirin enteric coated 81 milliGRAM(s) Oral daily  atorvastatin 80 milliGRAM(s) Oral at bedtime  dextrose 40% Gel 15 Gram(s) Oral once  dextrose 5%. 1000 milliLiter(s) (50 mL/Hr) IV Continuous <Continuous>  dextrose 5%. 1000 milliLiter(s) (100 mL/Hr) IV Continuous <Continuous>  dextrose 50% Injectable 25 Gram(s) IV Push once  dextrose 50% Injectable 12.5 Gram(s) IV Push once  dextrose 50% Injectable 25 Gram(s) IV Push once  epoetin martine-epbx (RETACRIT) Injectable 09831 Unit(s) SubCutaneous every 7 days  ferrous    sulfate 325 milliGRAM(s) Oral daily  folic acid 1 milliGRAM(s) Oral daily  gabapentin 100 milliGRAM(s) Oral daily  gentamicin 0.1% Cream 1 Application(s) Topical two times a day  glucagon  Injectable 1 milliGRAM(s) IntraMuscular once  heparin   Injectable 5000 Unit(s) SubCutaneous every 12 hours  insulin glargine Injectable (LANTUS) 12 Unit(s) SubCutaneous at bedtime  insulin lispro (ADMELOG) corrective regimen sliding scale   SubCutaneous every 6 hours  insulin lispro Injectable (ADMELOG) 6 Unit(s) SubCutaneous three times a day before meals  metoprolol tartrate 12.5 milliGRAM(s) Oral two times a day  midodrine. 5 milliGRAM(s) Oral three times a day  nortriptyline 25 milliGRAM(s) Oral at bedtime  pantoprazole    Tablet 40 milliGRAM(s) Oral before breakfast  senna 2 Tablet(s) Oral at bedtime  sevelamer carbonate 800 milliGRAM(s) Oral three times a day with meals  ticagrelor 60 milliGRAM(s) Oral every 12 hours      TELEMETRY: off tele  	    ECG:  	  RADIOLOGY:   DIAGNOSTIC TESTING:  [ ] Echocardiogram:  [ ]  Catheterization:  [ ] Stress Test:    OTHER: 	    LABS:	 	                       analgesics, opioids, topical analgesics, ice, heat, TENs, physical therapy (ongoing), physical therapist directed home exercise program HEP (ongoing), independent exercise program (ongoing)  Interventional Measures:   10/04/2021: Diagnostic and therapeutic left L4-L5, left L5-S1, left S1 transforaminal epidural steroid injections  03/20/2021: Left L4-L5 and left L5-S1 transforaminal epidural steroid injections  08/15/2018: Diagnostic and therapeutic left L4-L5 and left L5-S1 transforaminal epidural steroid injections  02/2015: Bilateral lumbar medial branch rhizotomies L2, L3, L4, L5; for bilateral lumbar facet joints at L3-L4, L4-L5, and L5-S1 with pain relief that lasted for almost 3 years  Surgical Measures: No history of previous cervical spine or shoulder surgery. No history of previous lumbar spine or hip surgery. History of right total knee arthroplasty on 12/17/2017  Yang Haji underwent neurosurgical consultation with Dr. Weber on 6/30/2021 and discussed L5-S1 decompression and fusion along with a foraminotomy on the left at L4-L5.   He has seen ENT for hearing loss and balance difficulties.   Yang Haji is a healthy adult other than for his chronic pain   In terms of current analgesics, Yang Haji takes: Duloxetine 20 mg daily, compounded cream, melatonin 5 mg at bedtime  I have reviewed [facility] Report consistent with medication reconciliation.  SOAPP: Low Risk     PHQ-2 Depression Screening  Little interest or pleasure in doing things? 0-->not at all   Feeling down, depressed, or hopeless? 0-->not at all   PHQ-2 Total Score 0     Patient screened negative for depression based on a PHQ-2 score of 0 on 04/27/2023    Pain Self-Efficacy Questionnaire (PSEQ)  ITEM 04-27 2023        I can enjoy things despite the pain. 3        I can do most of the household chores (tidying up, washing dishes, etc), despite the pain. 6        I can socialize with my friends or family members as often as I  used to do, despite the pain. 0        I can cope with my pain in most situations. 4        I can do some form of work, despite the pain (includes housework, paid, and unpaid work). 1        I can still do many of the things I enjoy doing, such as hobbies or leisure activity despite pain. 4        I can cope with my pain without medications. 4        I can accomplish most of my goals in life despite the pain. 3        I can live in a normal lifestyle, despite the pain. 0        I can gradually become more active, despite the pain. 3        TOTAL SCORE 28        28/60: average self-efficacy beliefs    Global Pain Scale 03-02 2018 04-27 2023         Pain 20 20         Feelings 12 10         Clinical outcomes 20 20         Activities 20 15         GPS Total: 72 65             NECK PAIN DISABILITY INDEX QUESTIONNAIRE  DATE 04-27 2023           Pain intensity  0: No pain  1: Mild  2: Moderate  3: Fairly severe  4: very severe  5: Worst imaginable 3           Personal Care   0: I can look after myself normally without causing extra pain.  1: I can look after myself normally, but it causes extra pain.  2: It is painful to look after myself and I am slow and careful.  3: I need some help, but manage most of my personal care.  4: I need help every day in most aspects of self-care.  5: I do not get dressed; I wash with difficulty and stay in bed. 2           Lifting  0: I can lift heavy weights without extra pain.  1: I can lift heavy weights, but it gives extra pain.  2: Pain prevents me from lifting heavy weights off the floor but I can manage if they are conveniently positioned, for example, on a table.  3: Pain prevents me from lifting heavy weights, but I can manage light to medium weights if they are conveniently positioned.  4: I can lift very light weights.  5: I cannot lift or carry anything at all. 3           Reading  0: I can read as much as I want to with no pain in my neck.  1: I can read as much as I want to  with slight pain in my neck.  2: I can read as much as I want to with moderate pain in my neck.  3: I cannot read as much as I want because of moderate pain in my neck.  4: I cannot read as much as I want because of severe pain in my neck.  5: I cannot read at all. 3           Headaches  0: I have no headaches at all.  1: I have slight headaches which come infrequently.  2: I have moderate headaches which come infrequently.  3: I have moderate headaches which come frequently.  4: I have severe headaches which come frequently.  5: I have headaches almost all the time. 3            Concentration  0: I can concentrate fully when I want to with no difficulty.  1: I can concentrate fully when I want to with slight difficulty.  2: I have a fair degree of difficulty in concentrating when I want to.  3: I have a lot of difficulty in concentrating when I want to.  4: I have a great deal of difficulty in concentrating when I want to.  5: I cannot concentrate at all. 1            Work  0: I can do as much work as I want to.  1: I can only do my usual work, but no more.  2: I can do most of my usual work, but no more.  3: I cannot do my usual work.  4: I can hardly do any work at all.  5: I cannot do any work at all. 1            Driving  0: I can drive my car without any neck pain.  1: I can drive my car as long as I want with slight pain in my neck.  2: I can drive my car as long as I want with moderate pain in my   neck.  3: I cannot drive my car as long as I want because of moderate pain   in my neck.  4: I can hardly drive at all because of severe pain in my neck.  5: I cannot drive my car at all. 3            Sleeping  0: I have no trouble sleeping.  1: My sleep is slightly disturbed (less than 1 hour sleepless).  2: My sleep is mildly disturbed (1-2 hours sleepless).  3: My sleep is moderately disturbed (2-3 hours sleepless).  4: My sleep is greatly disturbed (3-5 hours sleepless).  5: My sleep is completely disturbed  (5-7 hours) 4           Recreation  0: I am able to engage in all of my recreational activities with no neck pain at all.  1: I am able to engage in all of my recreational activities with some pain in my neck.  2: I am able to engage in most, but not all of my recreational activities because of pain in my neck.  3: I am able to engage in a few of my recreational activities because of pain in my neck.  4: I can hardly do any recreational activities because of pain in my neck.  5: I cannot do any recreational activities at all. 3            TOTAL SCORE 26               Shoulder Pain and Disability Index (SPADI)   PAIN SCALE:   How severe is your pain?   Pain scale 0/10 to 10/10 04-27 2023      What number describes your pain at its worst?  8      When lying on the involved side? 7      Reaching for something on a high shelf?  9      Touching the back of your neck? 5      Pushing with the involved arm? 8      Total Pain Score (MAX 50) 37 (74%)             DISABILITY SCALE:   How much difficulty do you have?  Difficulty scale 0/10 to 10/10        Washing your hair? 0      Washing your back? 8      Putting on an undershirt or jumper?  6      Putting on a shirt that buttons down the front?  5      Putting on your pants?  0      Placing an object on a high shelf?  9      Carrying a heavy object of 10 pounds (4.5 KG) 4      Removing something from your back pocket?  4      Total Disability Score (MAX 80) 36 (45%)             TOTAL SPADI SCORE () 73 (56%)        Total SPADI Score:  Total pain score: ___/ 50 x 100 = __%   Total disability score: __/ 80 x 100 = __%   Total Spadi score: ___/ 130 x 100 = __%     The [place] Back Pain Disability Scale   DATE 04-27 2023          Sleep through the night 5          Turn over in bed 4          Get out of bed 3          Make your bed 1          Put on socks (pantyhose) 0          Ride in a car 4          Sit in a chair for several hours 5          Stand up for 20-30 minutes 3           Climb one flight of stairs 1          Walk a few blocks (200-300 yards)  3          Walk several miles 5          Run one block (about 50 yards) 5          Take food out of the refrigerator 0          Reach up to high shelves 5          Move a chair 2          Pull or push heavy doors 5          Bend over to clean the bathtub 5          Throw a ball 5          Carry two bags of groceries 2          Lift and carry a heavy suitcase 3          Total score 66            [employer] Gait & Balance Assessment Tool  BALANCE  Domain Evaluation Characteristics Description of Deficit   Sitting Balance Leans or slides in chair = 0  Steady, safe = 1 1   Rises from chair   Unable to without help = 0  Able, uses arms to help = 1  Able without use of arms = 2 2   Attempts to rise   Unable to without help = 0  Able, requires > 1 attempt = 1  Able to rise, 1 attempt = 2 2   Immediate standing   balance (first 5 sec) Unsteady (staggers, trunk sway) = 0  Steady but uses walker or other support = 1  Steady without walker or other support = 2   2   Standing balance   Unsteady = 0  Steady but wide stance (>4 inches) or uses   support = 1  Narrow stance without support = 2 2   Nudge   Begins to fall = 0  Staggers, but catches self = 1  Steady = 2 2   Nudge, eyes closed Unsteady = 0  Steady = 1 1   Turning 360 degrees  (“make a complete   [place]”) Discontinuous steps = 0  Continuous = 1    Unsteady (grabs, staggers) = 0  Steady = 1 0        1   Sitting down   Unsafe (misjudged distance, falls into chair) = 0  Uses arms or not a smooth motion = 1  Safe, smooth motion = 2 2    BALANCE SCORE  15/16       GAIT  Domain Evaluation characteristics Description of deficit   Indication of gait (Immediately after “'go”) Any hesitancy or multiple attempts = 0   No hesitancy = 1 1   Step length and height Right foot swing   Does not pass L stance foot = 0   Steps past L foot = 1   Does not clear floor = 0   Clears floor = 1   Left foot swing   Does  not pass R stance foot = 0   Steps past R foot = 1   Does not clear floor = 0   Clears floor = 1 1      1    1      1   Step symmetry Right and left step length not equal = 0   Right and left step length equal = 1 1   Step continuity Stopping or discontinuity between steps = 0   Steps continuous = 1 1   Path Marked deviation = 0   Mild/moderate deviation or uses aid = 1 Straight without aid = 2 2   Trunk Marked sway or uses aid = 0   No sway but flexed knees or back or spread arms wide = 1   No sway, flexion, widened arms or aid = 2 2   Walking stance Heels apart = 0   Heels almost touching while walking = 1 1    GAIT SCORE 12/12    BALANCE SCORE 15/16    TOTAL SCORE 27/28         Risk of Fall LOW       Review of Diagnostic Studies: I have reviewed and interpreted the images with the patient and used the images and a tridimensional spine model to explain findings. I have reviewed the report, as well.  MRI of the cervical spine without contrast on 3/21/2023 revealed diffuse cervical spondylosis.  Mild grade 1 anterolisthesis of C7 on T1.  Spinal cord caliber and signal appear preserved.  Axial imaging:  C2-C3: No significant canal or foraminal stenosis  C3-C4: Disc bulge, facet hypertrophy.  Mild canal stenosis.  Moderate left foraminal stenosis  C4-C5: Disc osteophyte complex, facet arthropathy.  Moderate canal and foraminal stenosis  C5-C6: Disc osteophyte complex, facet hypertrophy.  Moderate to severe spinal canal stenosis and bilateral neuroforaminal stenosis  C6-C7: Disc osteophyte complex, facet arthropathy.  Mild to moderate canal stenosis and foraminal stenosis  C7-T1: No significant canal or foraminal stenosis  Arterial duplex Doppler of the carotid arteries revealed normal flow without evidence of hemodynamically significant stenosis of the carotid arteries.  Vertebral arteries with antegrade flow.  Arterial duplex Doppler of the upper extremities on 3/22/2023 revealed normal arterial flow without evidence  of stenosis in the upper extremities  MRI of the brain without and with contrast on 3/28/2023 revealed no contrast-enhancing abnormalities.  MRI of the internal auditory canal with and without contrast on 3/20/2023 revealed no acute findings in the internal auditory canal.  cervical x-rays including flexion and extension on 3/18/2023 revealed diffuse cervical spondylosis without evidence of instability with flexion or extension.  Uncovertebral joint hypertrophy and facet hypertrophy contributes to osseous neural foraminal stenosis.  Changes are more prominent at C5-C6 where there is a disc osteophyte complex  Bilateral shoulders x-rays on 3/22/2023 revealed left greater than right severe glenohumeral joint degenerative changes.    Review of Previous Diagnostic Studies:     MRI of the lumbar spine without contrast on 5/18/2021. There is some levoscoliosis.  Diffuse degenerative disc and facet joint disease.  There is anterolisthesis of L5 on S1.  The conus medullaris has an unremarkable appearance at T12-L1.  Axial imaging:  L1-L2: Disc desiccation, broad-based disc bulge.  Facet hypertrophy.  No significant canal stenosis.  Mild bilateral foraminal stenosis  L2-L3: Disc osteophyte complex contributing to mild bilateral neuroforaminal stenosis.  No significant canal stenosis.  Bilateral facet hypertrophy  L3-L4: Broad-based disc bulge with lateralization towards the left.  Facet hypertrophy more prominent on the left side.  Moderate left neuroforaminal stenosis.  L4-L5: Broad-based disc bulge, facet hypertrophy.  No significant canal stenosis.  Moderate to severe bilateral neuroforaminal stenosis  L5-S1: Broad-based disc bulge, facet hypertrophy.  Severe bilateral neuroforaminal stenosis   X-rays of the lumbar spine including flexion-extension films on 3/2/2021.  Diffuse lumbar spondylosis with severe facet hypertrophy.  Mild retrolisthesis of L1 on L2 and L2 on L3.  Grade 1 anterolisthesis of L5 on S1.  Alignment  remains unchanged during flexion and extension.  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    Eyes: Positive for redness.   Musculoskeletal: Positive for arthralgias, back pain, gait problem, neck pain and neck stiffness.   Neurological: Positive for numbness and headaches.   All other systems reviewed and are negative.        Patient Active Problem List   Diagnosis   • Primary osteoarthritis of right knee   • S/P total knee arthroplasty, right   • Tobacco abuse   • Spondylolisthesis of lumbosacral region   • Spondylosis of lumbar region without myelopathy or radiculopathy   • Pars defect with spondylolisthesis   • Myofascial pain   • Cervical spondylosis without myelopathy   • Abnormal stress test   • Lumbar stenosis with neurogenic claudication   • Left foot pain   • Osteoarthritis of left shoulder   • DDD (degenerative disc disease), cervical   • Conn tiss and disc stenosis of intvrt foramin of cerv region       Past Medical History:   Diagnosis Date   • Adult BMI 28.0-28.9 kg/sq m    • Back pain    • Back problem    • Cervical disc disorder April 2023   • Cervical spondylosis without myelopathy    • Chest pain    • Chronic pain disorder     December 2022   • Chronic pain of right knee    • Headache, tension-type December 2022   • HL (hearing loss)    • Joint pain December 2023   • Left groin hernia    • Low back pain Several years   • Lumbosacral disc disease Several years   • Neck pain Several years   • Palpitations    • Pars defect with spondylolisthesis    • PONV (postoperative nausea and  vomiting)    • Prediabetes    • Primary osteoarthritis of right knee    • SOB (shortness of breath)    • Spinal stenosis March 2023   • Spondylolisthesis of lumbosacral region    • Spondylosis of lumbar region without myelopathy or radiculopathy    • Staph infection 1974    right knee, treated with antibiotics    • Tobacco abuse    • Wears glasses          Past Surgical History:   Procedure Laterality Date   • CARDIAC CATHETERIZATION N/A 01/14/2021    Procedure: Left Heart Cath;  Surgeon: Nazario Salmon MD;  Location:  [facility];  Service: Cardiology;  Laterality: N/A;   • COLONOSCOPY  10/2017   • EPIDURAL BLOCK     • JOINT REPLACEMENT  2020    Knee replacement   • KNEE SURGERY Right 1974    debridement d/t staph infection   • LASIK Bilateral    • MEDIAL COLLATERAL LIGAMENT REPAIR, KNEE Right 1974   • RHIZOTOMY     • TOTAL KNEE ARTHROPLASTY Right 12/12/2017    Procedure: TOTAL KNEE ARTHROPLASTY RIGHT;  Surgeon: Nazario Leal MD;  Location:  [facility];  Service:          Family History   Problem Relation Age of Onset   • Diabetes Father    • Heart disease Father    • Hypertension Father    • Osteoarthritis Father    • Stroke Father    • Heart attack Father    • Arthritis Father    • Cancer Mother    • Heart disease Mother          Social History     Socioeconomic History   • Marital status:    Tobacco Use   • Smoking status: Never   • Smokeless tobacco: Current     Types: Chew   • Tobacco comments:     Less than 1/2 can of smokeless tobacco per week   Vaping Use   • Vaping Use: Never used   Substance and Sexual Activity   • Alcohol use: Not Currently     Comment: NA   • Drug use: No   • Sexual activity: Defer           Current Outpatient Medications:   •  Ascorbic Acid (VITAMIN C PO), Take 100 mg by mouth Daily., Disp: , Rfl:   •  aspirin 81 MG EC tablet, Take 1 tablet by mouth Daily. Resume in 1 month (Patient taking differently: Take 1 tablet by mouth Daily.), Disp: , Rfl:   •   "Dietary Management Product (Rheumate) capsule, Take one capsule by mouth once daily, Disp: 90 capsule, Rfl: 0  •  DULoxetine (CYMBALTA) 20 MG capsule, TAKE ONE CAPSULE BY MOUTH DAILY, Disp: 30 capsule, Rfl: 0  •  Gel Base gel, 2 g 4 (Four) Times a Day. prilocaine 2%, lidocaine 10%, imipramine 3%, capsaicin 0.001% and mannitol 20% (Patient taking differently: 2 g As Needed. prilocaine 2%, lidocaine 10%, imipramine 3%, capsaicin 0.001% and mannitol 20%), Disp: 240 g, Rfl: 5  •  melatonin 5 MG tablet tablet, Take 1 tablet by mouth., Disp: , Rfl:   •  Multiple Vitamins-Minerals (ZINC PO), Take 1 tablet by mouth Daily., Disp: , Rfl:   •  pantoprazole (PROTONIX) 40 MG EC tablet, Take 1 tablet by mouth Daily., Disp: , Rfl:   •  rosuvastatin (CRESTOR) 10 MG tablet, Take 1 tablet by mouth Daily., Disp: 30 tablet, Rfl: 11      No Known Allergies      Pulse 52   Temp 95.5 °F (35.3 °C)   Ht 185.4 cm (72.99\")   Wt 85.5 kg (188 lb 6.4 oz)   SpO2 99%   BMI 24.86 kg/m²       Physical Exam:  Constitutional: Patient appears well-developed, well-nourished, well-hydrated, appears younger than stated age  HEENT: Head: Normocephalic and atraumatic  Eyes: Conjunctivae and lids are normal  Pupils: Equal, round, reactive to light  Neck: Trachea normal. Neck supple. No JVD present.   Peripheral vascular exam: Carotid: right 2+, left 2+. Femoral: right 2+, left 2+. Posterior tibialis: right 2+ and left 2+. Dorsalis pedis: right 2+ and left 2+. No edema.   Musculoskeletal   Gait and station: Gait evaluation demonstrated a normal gait. Able to walk on heels, toes, tandem walking   Cervical spine: Passive and active range of motion are limited secondary to pain. Extension, lateral flexion, rotation of the cervical spine increased and reproduced pain. Cervical facet joint loading maneuvers are positive.  Muscles: Presence of active trigger points at the levator scapulae  Shoulders: The range of motion of the right glenohumeral joint is " almost full and without pain. Rotator cuff strength is 5/5. The range of motion of the left glenohumeral joint is almost full and without pain except with full forward flexion and abduction. Rotator cuff strength is 5/5.   Thoracic spine: Thoracic facet joint loading maneuvers are negative   Lumbar spine: Passive and active range of motion are slightly limited but without pain. Extension, flexion, lateral flexion, rotation of the lumbar spine did not increase or reproduce pain. Lumbar facet joint loading maneuvers are negative.  Puneet test and Gaenslen's test are negative   Piriformis maneuvers are negative   Hip joints: The range of motion of the hip joints is full and without pain   Palpation of the bilateral greater trochanter, unrevealing   Examination of the Iliotibial band: unrevealing   Neurological:   Patient is alert and oriented to person, place, and time.   Speech: Normal.   Cortical function: Normal mental status.   Reflex Scores:  Right brachioradialis: 1+  Left brachioradialis: 1+  Right biceps: 1+  Left biceps: 1+  Right triceps: 1+  Left triceps: 1+  Right patellar: 0+  Left patellar: 0+  Right Achilles: 0+  Left Achilles: 0+  Motor strength: 5/5  Motor Tone: Normal  Involuntary movements: None.   Superficial/Primitive Reflexes: Primitive reflexes were absent.   Right Whitfield: Absent  Left Whitfield: Absent  Right ankle clonus: Absent  Left ankle clonus: Absent   Babinsky: Absent  Spurling sign: Negative. Neck tornado test: Negative. Lhermitte sign: Negative. Long tract signs: Negative. Straight leg raising test: Negative.   Sensory exam: Intact to light touch, intact pain and temperature sensation, intact vibration sensation and normal proprioception.   Coordination: Normal finger to nose and heel to shin. Normal balance. Romberg's sign: Negative    Skin and subcutaneous tissue: Skin is warm and intact. No rash noted. No cyanosis.   Psychiatric: Judgment and insight: Normal. Recent and remote  memory: Intact. Mood and affect: Normal.       ASSESSMENT:   1. Osteoarthritis of left shoulder, unspecified osteoarthritis type    2. Cervical spondylosis without myelopathy    3. DDD (degenerative disc disease), cervical    4. Conn tiss and disc stenosis of intvrt foramin of cerv region    5. Lumbar stenosis with neurogenic claudication    6. Pars defect with spondylolisthesis    7. Spondylosis of lumbar region without myelopathy or radiculopathy    8. Spondylolisthesis of lumbosacral region    9. S/P total knee arthroplasty, right    10. Myofascial pain    11. Hearing loss, unspecified hearing loss type, unspecified laterality    12. History of balance disorder (patient relates to episodes of exacerbation of hearing loss)        PLAN/MEDICAL DECISION MAKING:  Mr. Yang Haji, 72 y.o. male presents with a history of chronic progressive posterior cervicalgia and left shoulder pain. Over the past months, he started experiencing more severe posterior neck pain associated with a decreased ROM as well as left shoulder joint pain.  Patient also has a known history of chronic difficulties with his lower back and left lower extremity. Yang Haji was last seen on 10/4/2021 when he underwent diagnostic and therapeutic left L4-L5, left L5-S1, left S1 transforaminal epidural steroid injections with local anesthetic and steroids from which he experienced significant pain relief and functional improvement that lasted for about 12 weeks.  By January 2022, he started to experience recurrence of his symptoms and at that point, we discussed prospects of a spinal cord stimulator trial. He had seen Dr. Weber on 6/30/2021 and discussed L5-S1 decompression and fusion along with a foraminotomy on the left at L4-L5. About a month ago, he contacted me due to a flare up of his chronic cervicalgia associated with let shoulder pain and difficulties with his balance. He complained of severe cervicalgia, and left shoulder pain ain  radiating down into the left arm associated with paresthesia in both arms. He also described difficulties with his balance, hearing loss, issues that had been previously worked up by ENT. MRIs of the brain and auditory canal were unrevealing. MRI of the cervical spine without contrast on 3/21/2023 revealed diffuse cervical spondylosis. Spinal cord caliber and signal appeared preserved.  Multilevel facet hypertrophy and disc osteophyte complexes, most significant at C5-C6 where there is moderate to severe spinal canal stenosis and bilateral neuroforaminal stenosis. Cervical x-rays including flexion and extension on 3/18/2023 revealed diffuse cervical spondylosis without evidence of instability with flexion or extension.  Uncovertebral joint hypertrophy and facet hypertrophy contributes to osseous neural foraminal stenosis. Bilateral shoulders x-rays on 3/22/2023 revealed left greater than right severe glenohumeral joint degenerative changes. Arterial duplex Doppler of the carotid arteries, vertebral arteries and upper extremities revealed no evidence of hemodynamically significant stenosis. MRI of the brain without and with contrast on 3/28/2023 revealed no contrast-enhancing abnormalities. MRI of the internal auditory canal with and without contrast on 3/20/2023 revealed no acute findings in the internal auditory canal.  Patient has failed to obtain pain relief with conservative measures including oral analgesics, opioids, topical analgesics, ice, heat, TENs, physical therapy (ongoing), physical therapist directed home exercise program HEP (ongoing), independent exercise program (ongoing), to name a few. Pain has progressed in intensity over the past months. Upon reviewing unidimensional and multidimensional scales: On the neck pain disability index questionnaire scored 26 reflecting moderate to severe disability, on the shoulder pain and disability index (SPADI) scored high on pain scale (74%) in contrast to the  disability scale (45%), which makes it suspicious for concurrent cervical and intrinsic shoulder disorder causing symptoms. In terms of a global pain scale, he scored higher on pain, clinical outcomes and activities; and average on feelings. In terms of the [place] back pain disability scale, he scored 64 out of 100 with most of the activities indicating a predominance for stenosis related symptoms rather than mechanical lower back issues. On PHQ 9 he scored 0 without no evidence of significant depression contributing to his overall pain experience, and on pain self-efficacy questionnaire he scored 28 out of 60 reflecting average self-efficacy beliefs. Upon physical examination: He exhibits limited range of motion of his left shoulder with reproduction of pain upon provocative maneuvers, examination of the cervical spine reveals positive cervical facet joint maneuvers, limited range of motion, and absence of any signs or symptoms of cervical myelopathy or radiculopathy at this time. On Tinetti Gait & Balance Assessment Tool; he scored for balance 15/16, gait 12/12, total score: 27/28. Risk of fall: Low. A comprehensive evaluation including history and physical exam along with pertinent physiologic and functional assessment was performed. Patient presents with intractable pain due to the diagnoses listed above. Patient has failed to respond to conservative modalities, as referenced under HPI including the impact of patient's moderate-to-severe pain contributing to significant impairment in daily activities, ADLs, and a negative impact on quality of life. Patient has done quite well from previous minimally invasive interventional pain management measures, as referenced under HPI and on previous notes. Supporting diagnostic studies of patient's chronic pain condition have been reviewed. I have reviewed all available patient's medical records as well as previous therapies as referenced above. I had a lengthy conversation  with . Yang Haji regarding his chronic pain condition and potential therapeutic options including risks, benefits, alternative therapies, to name a few. We have discussed using a stepwise approach starting with the shortest or least intense level of treatment, care, or service as determined by the extent required to diagnose and or treat patient's condition. The treatments proposed are consistent with the patient's medical condition and are known to be as safe and effective by current guidelines and standard of care. There is no evidence of absolute contraindications for the proposed procedures under the current circumstances. These treatments are not considered experimental or investigational. The duration and frequency proposed are considered appropriate for the service in accordance with accepted standards of medical practice for the diagnosis and or treatment of the patient's condition and or intended to improve the patient's level of function. These services will be furnished in a setting appropriate to the patient's medical needs and condition. Therefore, I have proposed the following plan:  1. Interventional pain management measures:  A. Treatment of left shoulder pain/OA: Patient will be scheduled for diagnostic and therapeutic left glenohumeral joint injection with local anesthetics and steroids.  If patient fails obtain sustained relief, then, I will obtain an MRI of his shoulder and will refer him to orthopedics. Other options for treatment of his left shoulder pain/OA would include diagnostic left suprascapular nerve block and left axillary nerve block. If patient experiences more than 50% pain relief and improvement the range of motion of the shoulder joint, then, patient will be scheduled for a second set of diagnostic left suprascapular nerve block and left axillary nerve block, to then, proceed with bipolar radiofrequency ablation of the terminal sensory articular branches of the left  suprascapular nerve and axillary nerve.   B.  Treatment of chronic mechanical cervicalgia: Patient presents with chronic unrelenting moderate to severe axial mechanical cervical spine facetogenic pain without evidence of underlying or untreated radiculopathy and that causes functional deficit measured on pain scales and or functional and disability scales. Pain has been present for >10 years. Yang Haji average pain level for the past months has been rated as 7/10 ranging from 4/10 to 10/10. Patient has failed to obtain pain relief or functional improvement from conservative measures, as referenced under HPI. Pain assessment has been performed and documented at baseline, and will be reassessed after each diagnostic procedure using the same pain scale for each assessment. A disability scale has also been obtained at baseline and will be used for functional assessment.  Diagnostic interventions will be performed without sedation or with the use of light sedation (but without the use of opioids) depending on patient's specific medical requirements. For radiofrequency procedures; we may proceed without sedation or with various degrees of sedation according to patient's specific requirements. All bilateral procedures will be done in one encounter. Patient will be scheduled for a first set of diagnostic bilateral cervical medial branch blocks at C4, C5, C6; for bilateral cervical facet joints at C4-C5, and C5-C6, to clarify the origin of chronic refractory mechanical/axial cervicalgia. If patient experiences 80% or more pain relief along with significant functional improvement and increase in the range of motion of the cervical spine, then, patient will be scheduled for a second set of diagnostic bilateral cervical medial branch blocks, to then, proceed with bilateral cervical medial branch rhizotomies. If patient experiences 50% or more pain relief and functional improvement for at least six months, we could repeat  the procedure. If more than 2 years elapse since last RFTC, then, patient will be required to undergo diagnostic blocks prior to repeating RFTC. Otherwise, we may consider cervical epidural steroid injection by interlaminar approach using the lowest effective dose of steroids, under C-arm fluoroscopic guidance, with the use of contrast dye (unless contraindicated) to confirm appropriate needle placement and spread of contrast dye. We may repeat cervical epidural steroid injection by interlaminar approach depending on patient's outcome.  As per current guidelines, epidurals will be limited to a maximum of 4 sessions per spinal region in a rolling twelve (12) month period. Continuation of epidural steroid injections over 12 months would only be considered under the following provisions;  • Patient is a high-risk surgical candidate, or the patient does not desire surgery, or recurrence of pain in the same location relieved with ESIs for at least three months and epidural provides at least 50% sustained improvement of pain and/or 50% objective improvement in function (using same scale as baseline)  • Pain is severe enough to cause a significant degree of functional disability or vocational disability  • The primary care provider will be notified regarding continuation of procedures and repeat steroid use   If he continues to struggle with pain, then, I will refer him back to Dr. Darshan Weber for surgical consideration  B. Treatment of chronic intractable lower back pain/spondylolisthesis/lumbar stenosis claudication: I have discussed with the patient at length prospects of a spinal cord stimulator trial with [employer] or [employer]. Patient has already received formal education from me including risks, benefits, and alternative treatments, as well as detailed information regarding the procedure and specific goals for the trial. Patient has also received didactic materials including educational booklets and DVDs on  spinal cord stimulation therapies.  2. Diagnostic studies: None indicated at this time. Prior to SCS, patient will need CBC, PT, PTT and MRI of the thoracic spine without contrast to assess capacity and patency of the spinal canal and epidural space prior to spinal cord stimulator trial and implant  3. Pharmacological measures: Reviewed and discussed; Patient takes duloxetine 20 mg daily, compounded cream, melatonin 5 mg at bedtime. Patient has declined additional pharmacological measures   4. Long-term rehabilitation efforts:  A. The patient does not have a history of falls. I did complete a risk assessment for falls.   B. Patient will start a comprehensive physical therapy program at [facility] for upper body strengthening/posture correction, core strengthening, gait and balance training, neurodynamics, E-STIM, myofascial release, cupping, dry needling, posture/position body mechanics, home exercise program  C. Start an exercise program such as yoga, Pilates, water therapy and swimming  D. I will consider prescribing a home traction unit depending on patient's response to treatment  E. Contrast therapy: Apply ice-packs for 15-20 minutes, followed by heating pads for 15-20 minutes to affected area   F. Prior to consideration of a spinal cord stimulator trial, patient will need a referral to Dr. Haider Castrejon for psychological screening for spinal cord stimulation and intrathecal therapies.  GJaja Yang VIDES Raissa  reports that he has never smoked. His smokeless tobacco use includes chew. I have educated him on the risk of diseases from using tobacco products such as cancer, COPD and heart disease. I advised him to quit and he is willing to quit on his own. I spent 3 minutes counseling the patient.  H. Follow-up with ENT for hearing loss and episodic gait and balance difficulties.   5. The patient has been instructed to contact my office with any questions or difficulties. The patient understands the  plan and agrees to proceed accordingly.    The patient has a documented plan of care to address chronic pain. Yang Haji reports a pain score of 8/10 .  Given his pain assessment as noted, treatment options were discussed and the following options were decided upon as a follow-up plan to address the patient's pain: continuation of current treatment plan for pain, educational materials on pain management, home exercises and therapy, prescription for non-opiod analgesics, referral to Physical Therapy, referral to specialist for assistance in pain treatment guidance, steroid injections, use of non-medical modalities (ice, heat, stretching and/or behavior modifications) and interventional pain management measures.              Pain Management Panel          View : No data to display.                       SALLY query complete. SALLY reviewed by Coleman Fung MD.     Pain Medications             aspirin 81 MG EC tablet Take 1 tablet by mouth Daily. Resume in 1 month    DULoxetine (CYMBALTA) 20 MG capsule TAKE ONE CAPSULE BY MOUTH DAILY           No orders of the defined types were placed in this encounter.     Time spent reviewing diagnostic studies, consultation reports, previous treatments, pre-chartin minutes  Time spent face-to-face with the patient:  56 minutes  Time spent ordering diagnostic studies, referrals, and writing this note: 14 minutes  Total Time: 78 minutes    Please note that portions of this note were completed with a voice recognition program.     Coleman Fung MD    Patient Care Team:  Jamarcus Harmon MD as PCP - General (General Surgery)  Coleman Fung MD as Consulting Physician (Pain Medicine)  Nazario Leal MD as Consulting Physician (Orthopedic Surgery)  Juan Gonzalez MD (Inactive) as Consulting Physician (Neurosurgery)  Gal Bueno MD as Consulting Physician (Neurology)     No orders of the defined types were placed in this encounter.        No future  appointments.         normal...

## 2023-04-27 ENCOUNTER — OFFICE VISIT (OUTPATIENT)
Dept: PAIN MEDICINE | Facility: CLINIC | Age: 73
End: 2023-04-27
Payer: MEDICARE

## 2023-04-27 VITALS
BODY MASS INDEX: 24.97 KG/M2 | WEIGHT: 188.4 LBS | TEMPERATURE: 95.5 F | HEART RATE: 52 BPM | OXYGEN SATURATION: 99 % | HEIGHT: 73 IN

## 2023-04-27 DIAGNOSIS — Z96.651 S/P TOTAL KNEE ARTHROPLASTY, RIGHT: ICD-10-CM

## 2023-04-27 DIAGNOSIS — M47.812 CERVICAL SPONDYLOSIS WITHOUT MYELOPATHY: ICD-10-CM

## 2023-04-27 DIAGNOSIS — H91.90 HEARING LOSS, UNSPECIFIED HEARING LOSS TYPE, UNSPECIFIED LATERALITY: ICD-10-CM

## 2023-04-27 DIAGNOSIS — M79.18 MYOFASCIAL PAIN: ICD-10-CM

## 2023-04-27 DIAGNOSIS — M47.816 SPONDYLOSIS OF LUMBAR REGION WITHOUT MYELOPATHY OR RADICULOPATHY: ICD-10-CM

## 2023-04-27 DIAGNOSIS — M43.10 PARS DEFECT WITH SPONDYLOLISTHESIS: ICD-10-CM

## 2023-04-27 DIAGNOSIS — M50.30 DDD (DEGENERATIVE DISC DISEASE), CERVICAL: ICD-10-CM

## 2023-04-27 DIAGNOSIS — M43.00 PARS DEFECT WITH SPONDYLOLISTHESIS: ICD-10-CM

## 2023-04-27 DIAGNOSIS — Z87.898 HISTORY OF BALANCE DISORDER: ICD-10-CM

## 2023-04-27 DIAGNOSIS — M48.062 LUMBAR STENOSIS WITH NEUROGENIC CLAUDICATION: ICD-10-CM

## 2023-04-27 DIAGNOSIS — M19.012 OSTEOARTHRITIS OF LEFT SHOULDER, UNSPECIFIED OSTEOARTHRITIS TYPE: ICD-10-CM

## 2023-04-27 DIAGNOSIS — M19.012 OSTEOARTHRITIS OF LEFT SHOULDER, UNSPECIFIED OSTEOARTHRITIS TYPE: Primary | ICD-10-CM

## 2023-04-27 DIAGNOSIS — M43.17 SPONDYLOLISTHESIS OF LUMBOSACRAL REGION: ICD-10-CM

## 2023-04-27 DIAGNOSIS — M99.71 CONN TISS AND DISC STENOSIS OF INTVRT FORAMIN OF CERV REGION: ICD-10-CM

## 2023-05-01 ENCOUNTER — OUTSIDE FACILITY SERVICE (OUTPATIENT)
Dept: PAIN MEDICINE | Facility: CLINIC | Age: 73
End: 2023-05-01
Payer: MEDICARE

## 2023-05-02 ENCOUNTER — TELEPHONE (OUTPATIENT)
Dept: PAIN MEDICINE | Facility: CLINIC | Age: 73
End: 2023-05-02
Payer: MEDICARE

## 2023-05-02 NOTE — TELEPHONE ENCOUNTER
FOLLOW-UP CALL AFTER PROCEDURE  I spoke with Yang Haji regarding how they're feeling after yesterday's procedure with Dr. Fung. Patient reports that he is doing well.   He underwent a diagnostic procedure (see procedure note for details) on 5/1/2023. Patient was examined in the recovery room after the procedure by Dr. Fung (see procedure note for details). Patient reported 100% pain relief. In addition, patient experienced significant functional improvement (performing activities without experiencing pain in comparison with examination prior to the procedure that reproduced pain with those activities).   Pain level before procedure: 7/10   Pain level after procedure: 0/10  Patient reports that the pain relief lasted hours. Today, Yang Haji reports 60-70% ongoing pain relief pain (pain level 4/10) Patient denies side effects or complications  Patient does not have any questions or concerns at this time    Disposition:   • I provided information regarding date and time of next procedure: 5/3/2023 with 0830 am arrival.  • I advised that patient must have a , and that the  must remain in the premises until after the procedure is completed and the patient is ready to be discharged.   • Reminded NPO status: Nothing by mouth (eat or drink) for at least 8 hours prior to the procedure. Patient can take medications with a a sip of water.     Understanding of above information verbalized.

## 2023-05-03 ENCOUNTER — OUTSIDE FACILITY SERVICE (OUTPATIENT)
Dept: PAIN MEDICINE | Facility: CLINIC | Age: 73
End: 2023-05-03
Payer: MEDICARE

## 2023-05-03 DIAGNOSIS — M47.812 CERVICAL SPONDYLOSIS WITHOUT MYELOPATHY: Primary | ICD-10-CM

## 2023-05-04 ENCOUNTER — TELEPHONE (OUTPATIENT)
Dept: PAIN MEDICINE | Facility: CLINIC | Age: 73
End: 2023-05-04
Payer: MEDICARE

## 2023-05-04 NOTE — TELEPHONE ENCOUNTER
FOLLOW-UP CALL AFTER PROCEDURE  I spoke with Yang Haji's wife Evelia regarding how they're feeling after yesterday's procedure with Dr. Fung. Evelia reports that  he is doing well.   Pt underwent a diagnostic procedure (see procedure note for details) on 5/3/2023 . Patient reported 100% pain relief at the time of after procedure exam with Dr. Fung. In addition, patient experienced significant functional improvement (performing activities without experiencing pain in comparison with examination prior to the procedure that reproduced pain with those activities).   Pain level before procedure: 8/10   Pain level after procedure: 0/10  Patients wife Evelia reports that the pain relief is still lasting for the pt.   Disposition:   • I provided information regarding date and time of next procedure: 5/15/2023 with 0930 am arrival .   • Reminded that the procedure is in the [address], 3rd floor.   • I advised that patient must have a , and that the  must remain in the premises until after the procedure is completed and the patient is ready to be discharged.   • Reminded NPO status: Nothing by mouth (eat or drink) for at least 8 hours prior to the procedure. Patient can take medications with a a sip of water.     Understanding of above information verbalized.

## 2023-05-08 DIAGNOSIS — M47.812 CERVICAL SPONDYLOSIS WITHOUT MYELOPATHY: Primary | ICD-10-CM

## 2023-05-15 ENCOUNTER — OUTSIDE FACILITY SERVICE (OUTPATIENT)
Dept: PAIN MEDICINE | Facility: CLINIC | Age: 73
End: 2023-05-15
Payer: MEDICARE

## 2023-05-15 DIAGNOSIS — M47.812 CERVICAL SPONDYLOSIS WITHOUT MYELOPATHY: Primary | ICD-10-CM

## 2023-05-16 ENCOUNTER — TELEPHONE (OUTPATIENT)
Dept: PAIN MEDICINE | Facility: CLINIC | Age: 73
End: 2023-05-16
Payer: MEDICARE

## 2023-05-16 NOTE — TELEPHONE ENCOUNTER
FOLLOW-UP CALL AFTER PROCEDURE  I spoke with Yang Haji regarding how they're feeling after yesterday's procedure with Dr. Fung. Patient reports that  he is doing well.   Pt underwent a diagnostic procedure (see procedure note for details) on 5/15/2023 . Patient reported 100% pain relief at the time of after procedure exam with Dr. Fung. In addition, patient experienced significant functional improvement (performing activities without experiencing pain in comparison with examination prior to the procedure that reproduced pain with those activities).   Pain level before procedure: 8/10   Pain level after procedure: 0/10  Patient reports that the pain relief lasted for hours. Today, Yang Haji reports 60% ongoing pain relief pain (pain level 6/10) .  Patient denies side effects or complications  Patient does not have any questions or concerns at this time    Disposition:   • I provided information regarding date and time of next procedure: May 24, 2023 with 0630 am arrival.   • Reminded that the procedure is in the [address], 3rd floor.   • I advised that patient must have a , and that the  must remain in the premises until after the procedure is completed and the patient is ready to be discharged.   • Reminded NPO status: Nothing by mouth (eat or drink) for at least 8 hours prior to the procedure. Patient can take medications with a a sip of water.     Understanding of above information verbalized.

## 2023-05-22 ENCOUNTER — OUTSIDE FACILITY SERVICE (OUTPATIENT)
Dept: PAIN MEDICINE | Facility: CLINIC | Age: 73
End: 2023-05-22
Payer: MEDICARE

## 2023-05-22 PROCEDURE — 99152 MOD SED SAME PHYS/QHP 5/>YRS: CPT | Performed by: ANESTHESIOLOGY

## 2023-05-22 PROCEDURE — 64633 DESTROY CERV/THOR FACET JNT: CPT | Performed by: ANESTHESIOLOGY

## 2023-05-22 PROCEDURE — 64634 DESTROY C/TH FACET JNT ADDL: CPT | Performed by: ANESTHESIOLOGY

## 2023-05-23 ENCOUNTER — TELEPHONE (OUTPATIENT)
Dept: PAIN MEDICINE | Facility: CLINIC | Age: 73
End: 2023-05-23
Payer: MEDICARE

## 2023-05-23 NOTE — TELEPHONE ENCOUNTER
FOLLOW-UP CALL AFTER PROCEDURE  I spoke with Yang Haji regarding how they're feeling after yesterday's procedure with Dr. Fung. Patient reports that  he  is doing well.   Pt underwent a diagnostic procedure (see procedure note for details) on 5/22/2023 . Patient reported 100% pain relief at the time of after procedure exam with Dr. Fung. In addition, patient experienced significant functional improvement (performing activities without experiencing pain in comparison with examination prior to the procedure that reproduced pain with those activities).   Pain level before procedure: 6/10   Pain level after procedure: 0/10  Patient reports that the pain relief lasting  hours. Today, Yang Haji reports 70-80 % ongoing pain relief pain (pain level 3-4/10)    Patient denies side effects or complications  Patient does not have any questions or concerns at this time.  Advised pt of next follow up with Dr. Fung October 5, 2023 at 1130.

## 2023-10-04 PROBLEM — H91.90 HEARING LOSS: Status: ACTIVE | Noted: 2023-10-04

## 2023-10-04 NOTE — PROGRESS NOTES
"Chief Complaint: \"I did well after my rhizotomy. The range of motion is still better and the pain is coming back. I also have lower back pain.\"        History of Present Illness:  Mr. Yang Haji, 72 y.o. male returns to the clinic for follow-up after undergoing bilateral cervical medial branch rhizotomies on 05/22/2023, from which he reports almost 100% pain relief and functional improvement lasting for more than four months, and for the past weeks more than 50% ongoing relief and functional improvement although there has been progressive recurrence. Patient used to experience bilateral cervicogenic and occipital headaches that have resolved since his cervical RFTC. On 05/01/2023, he underwent diagnostic and therapeutic left glenohumeral joint injection with local anesthetic and steroids, from which he reports experiencing 100% pain relief and functional improvement lasting for more than 5 months and 50% ongoing pain relief and functional improvement. His mechanical lower back pain has been progressively recurring. He denies radicular symptoms, cauda equina symptoms or any symptoms of myelopathy. He denies new symptoms or significant changes in his medical history. He has been under the care of ENT for hearing loss and intermittent balance disturbance. Yang Haji has actively participated in conservative measures for the past decade as his daughter is a Doctor in physical therapy so that he has had extensive courses of PT. He failed trials with oral analgesics, opioids, topical analgesics, ice, heat, TENs, physical therapy, physical therapist directed home exercise program HEP (ongoing), therapeutic massage, independent exercise program (ongoing), to name a few.    PROBLEM #1: Chronic Posterior Cervicalgia  Pain History: Patient Yang Haji 72 y.o. male reports a more than 40-year history of progressive posterior cervical pain, which started after sport-related injuries. Over the past months, he " started experiencing more severe posterior neck pain associated with left shoulder pain and decreased ROM.  He developed difficulties with his balance and hearing loss addressed by ENT (normal MRIs of the brain and acoustic nerve). Prior to his last visit, he presented with a several month history of recurrent cervicalgia, left shoulder pain and pain radiating down into the left upper extremity associated with paresthesia. He also described significant difficulties with his balance. Yang Haji had previously been worked up by ENT with MRIs of the brain and auditory canal that were unrevealing. MRI of the brain without and with contrast on 3/28/2023 revealed no contrast-enhancing abnormalities. MRI of the internal auditory canal with and without contrast on 3/20/2023 revealed no acute findings in the internal auditory canal. MRI of the cervical spine without contrast on 03/21/2023 revealed diffuse cervical spondylosis. Spinal cord caliber and signal appear normal. Multilevel facet hypertrophy and disc osteophyte complexes, most significant at C5-C6 where there is moderate to severe spinal canal stenosis and bilateral neuroforaminal stenosis. Cervical X-rays including flexion and extension on 03/18/2023 revealed diffuse cervical spondylosis without evidence of instability with flexion or extension. Facet hypertrophy contributes to osseous neural foraminal stenosis. Bilateral shoulders X-rays on 03/22/2023 revealed left greater than right severe glenohumeral joint degenerative changes. Arterial duplex   Doppler of the carotid arteries, vertebral arteries and upper extremities revealed no evidence of hemodynamically significant stenosis. Yang Haji underwent bilateral cervical medial branch rhizotomies on 05/22/2023, from which he reports   Experiencing almost 100% pain relief and functional improvement lasting for more than four months, and for the past weeks more than 50% ongoing relief and functional  improvement although there has been progressive recurrence. Patient used to experience bilateral cervicogenic and occipital headaches that have resolved since his cervical RFTC.  On 05/01/2023, he underwent diagnostic and therapeutic left glenohumeral joint injection with local anesthetic and steroids, from which he reports experiencing 100% pain relief and functional improvement lasting for more than 5 months and 50% ongoing pain relief and functional improvement. Pain has been recurring over the past weeks.  Pain Description: Constant posterior neck pain pain with intermittent exacerbation, described as aching, dull, sharp, and throbbing sensation.   Radiation of Pain: The pain radiates into the shoulder blades  Pain intensity today: 5/10   Average pain intensity last week: 4/10  Pain intensity ranges from: 4/10 to 8/10  Aggravating factors: Pain increases at the end of the range of motion of his cervical spine with extension or rotation, some activities such as walking. His left shoulder pain increases when lying on his left side, lifting   Alleviating factors: Pain decreases with ice, heat, analgesics, changing positions, moving away from the site of pain, sitting down, lying down  Associated Symptoms:   Patient denies pain, numbness or weakness upper extremities at this time.   Patient denies any new bladder or bowel problems.   Patient reports intermittent difficulties with his balance but denies recent falls. Patient does not use any gait assistive device   Patient denies bilateral cervicogenic and occipital headaches at this time. He used to experience frequent headaches prior to RFTC lasting several hours.  Pain still interferes with general activities and affects patient's quality of life  Pain does not interfere with sleep at this time.    PROBLEM #2: Lower Back Pain  Pain History: Yang Haji presents with a several year history of lower back and left lower extremity pain, which began without  incident. Yang Haji is a former . He sustained numerous sport-related injuries throughout his life. Yang Haji failed trials with numerous medications including gabapentin, muscle relaxers, NSAIDs, to name a few. In the past, he experienced significant pain relief and functional improvement with lumbar medial branch rhizotomies and lumbar epidurals. Yang Haji underwent on 10/04/2021 diagnostic and therapeutic left L4-L5, left L5-S1, left S1 transforaminal epidural steroid injections with local anesthetic and steroids, from which he experienced significant pain relief and functional improvement that lasted for more than 6 months. In January of 2022, we discussed prospects of a spinal cord stimulator trial as he started experiencing recurrent symptoms. Fortunately, his lower back pain remained tolerable until recently. Yang Haji underwent neurosurgical consultation with Dr. Darshan Weber on 06/30/2021 and discussed L5-S1 decompression and fusion along with a foraminotomy on the left at L4-L5.  Mr. Haji would like to avoid surgery by all means. He is aware of his spondylolisthesis. However, he does not have any radicular symptoms or neurogenic claudication at this time. He is complaining of axial mechanical lower back pain that responded well to previous RFTC.  Pain Description: Constant lower back pain with intermittent exacerbation described as sharp, burning, and throbbing sensation   Radiation of pain: The pain radiates into the gluteal region  Pain intensity today: 6/10  Average pain intensity last week: 7/10  Pain intensity ranges from 4/10 to 8/10  Aggravating factors: Pain increases with movements of his lower back, protracted standing or walking (walks 3-4 miles per day)  Alleviating factors: Pain decreases with sitting or lying down   Associated Symptoms:   Patient denies pain, numbness, or weakness in the lower extremities.      Review of previous therapies and  additional medical records:  Yang Haji has already failed the following measures, including:   Conservative Measures: Oral analgesics, opioids, topical analgesics, ice, heat, TENs, physical therapy (ongoing), physical therapist directed home exercise program HEP (ongoing), independent exercise program (ongoing)  Interventional Measures:   05/22/2023: Bilateral cervical RFTC C4-C6  05/15/2023: Second set diagnostic cervical MBBs  05/03/2023: First set diagnostic cervical MBB's  05/01/2023: Diagnostic and therapeutic left glenohumeral joint injection with local anesthetic and steroids   10/04/2021: Diagnostic and therapeutic left L4-L5, left L5-S1, left S1 transforaminal epidural steroid injections  03/20/2021: Left L4-L5 and left L5-S1 transforaminal epidural steroid injections  08/15/2018: Diagnostic and therapeutic left L4-L5 and left L5-S1 transforaminal epidural steroid injections  02/2015: Bilateral lumbar medial branch rhizotomies L2, L3, L4, L5; for bilateral lumbar facet joints at L3-L4, L4-L5, and L5-S1 with pain relief that lasted for almost 3 years  Surgical Measures: No history of previous cervical spine or shoulder surgery. No history of previous lumbar spine or hip surgery. 12/17/2017: Right total knee arthroplasty    Yang Haji underwent neurosurgical consultation with Dr. Weber on 6/30/2021 and discussed L5-S1 decompression and fusion along with a foraminotomy on the left at L4-L5.   Yang Haji underwent ENT consultation for his hearing loss and balance difficulties.   Yang Haji is a healthy adult other than for his chronic pain, on ASA 81 mg daily   In terms of current analgesics, Yang Haji takes: Duloxetine 20 mg daily, Rheumate, (Analgesic gel: prilocaine 2%, lidocaine 10%, imipramine 3%, capsaicin 0.001%, mannitol 20% cream), melatonin 5 mg at bedtime  I have reviewed [facility] Report consistent with medication reconciliation.  SOAPP: Low Risk      PHQ-2 Depression  Screening  Little interest or pleasure in doing things? 0-->not at all   Feeling down, depressed, or hopeless? 0-->not at all   PHQ-2 Total Score 0     Pain Self-Efficacy Questionnaire (PSEQ)  ITEM 04-27  2023 10-05  2023           I can enjoy things despite the pain. 3  5           I can do most of the household chores (tidying up, washing dishes, etc), despite the pain. 6  5           I can socialize with my friends or family members as often as I used to do, despite the pain. 0  6           I can cope with my pain in most situations. 4  5           I can do some form of work, despite the pain (includes housework, paid, and unpaid work). 1  5           I can still do many of the things I enjoy doing, such as hobbies or leisure activity despite pain. 4  5           I can cope with my pain without medications. 4  5           I can accomplish most of my goals in life despite the pain. 3  5           I can live in a normal lifestyle, despite the pain. 0  6           I can gradually become more active, despite the pain. 3  4           TOTAL SCORE 28/60  51/60              Global Pain Scale 03-02  2018 04-27  2023 10-05  2023              Pain 20 20  14             Feelings 12 10  0             Clinical outcomes 20 20  8             Activities 20 15  0             GPS Total: 72 65  22                   NECK PAIN DISABILITY INDEX QUESTIONNAIRE  DATE 04-27  2023 10-05  2023                  Pain intensity  0: No pain  1: Mild  2: Moderate  3: Fairly severe  4: very severe  5: Worst imaginable 3  2                 Personal Care   0: I can look after myself normally without causing extra pain.  1: I can look after myself normally, but it causes extra pain.  2: It is painful to look after myself and I am slow and careful.  3: I need some help, but manage most of my personal care.  4: I need help every day in most aspects of self-care.  5: I do not get dressed; I wash with difficulty and stay in bed. 2  0                  Lifting  0: I can lift heavy weights without extra pain.  1: I can lift heavy weights, but it gives extra pain.  2: Pain prevents me from lifting heavy weights off the floor but I can manage if they are conveniently positioned, for example, on a table.  3: Pain prevents me from lifting heavy weights, but I can manage light to medium weights if they are conveniently positioned.  4: I can lift very light weights.  5: I cannot lift or carry anything at all. 3  3                 Reading  0: I can read as much as I want to with no pain in my neck.  1: I can read as much as I want to with slight pain in my neck.  2: I can read as much as I want to with moderate pain in my neck.  3: I cannot read as much as I want because of moderate pain in my neck.  4: I cannot read as much as I want because of severe pain in my neck.  5: I cannot read at all. 3  3                 Headaches  0: I have no headaches at all.  1: I have slight headaches which come infrequently.  2: I have moderate headaches which come infrequently.  3: I have moderate headaches which come frequently.  4: I have severe headaches which come frequently.  5: I have headaches almost all the time. 3   1                 Concentration  0: I can concentrate fully when I want to with no difficulty.  1: I can concentrate fully when I want to with slight difficulty.  2: I have a fair degree of difficulty in concentrating when I want to.  3: I have a lot of difficulty in concentrating when I want to.  4: I have a great deal of difficulty in concentrating when I want to.  5: I cannot concentrate at all. 1   1                 Work  0: I can do as much work as I want to.  1: I can only do my usual work, but no more.  2: I can do most of my usual work, but no more.  3: I cannot do my usual work.  4: I can hardly do any work at all.  5: I cannot do any work at all. 1   1                 Driving  0: I can drive my car without any neck pain.  1: I can drive my car as long as  I want with slight pain in my neck.  2: I can drive my car as long as I want with moderate pain in my   neck.  3: I cannot drive my car as long as I want because of moderate pain   in my neck.  4: I can hardly drive at all because of severe pain in my neck.  5: I cannot drive my car at all. 3   2                 Sleeping  0: I have no trouble sleeping.  1: My sleep is slightly disturbed (less than 1 hour sleepless).  2: My sleep is mildly disturbed (1-2 hours sleepless).  3: My sleep is moderately disturbed (2-3 hours sleepless).  4: My sleep is greatly disturbed (3-5 hours sleepless).  5: My sleep is completely disturbed (5-7 hours) 4  4                 Recreation  0: I am able to engage in all of my recreational activities with no neck pain at all.  1: I am able to engage in all of my recreational activities with some pain in my neck.  2: I am able to engage in most, but not all of my recreational activities because of pain in my neck.  3: I am able to engage in a few of my recreational activities because of pain in my neck.  4: I can hardly do any recreational activities because of pain in my neck.  5: I cannot do any recreational activities at all. 3   3                 TOTAL SCORE 26  20                       Shoulder Pain and Disability Index (SPADI)   PAIN SCALE:   How severe is your pain?   Pain scale 0/10 to 10/10 04-27  2023 10-05  2023        What number describes your pain at its worst?  8  6       When lying on the involved side? 7  3       Reaching for something on a high shelf?  9  3       Touching the back of your neck? 5  3       Pushing with the involved arm? 8  4       Total Pain Score (MAX 50) 37 (74%)  19                   DISABILITY SCALE:   How much difficulty do you have?  Difficulty scale 0/10 to 10/10            Washing your hair? 0  0       Washing your back? 8  4       Putting on an undershirt or jumper?  6  3       Putting on a shirt that buttons down the front?  5  3       Putting on  your pants?  0  0       Placing an object on a high shelf?  9  6       Carrying a heavy object of 10 pounds (4.5 KG) 4  2       Removing something from your back pocket?  4  2       Total Disability Score (MAX 80) 36 (45%)  20                   TOTAL SPADI SCORE () 73 (56%)  39          The [place] Back Pain Disability Scale   DATE 04-27  2023 10-05  2023                Sleep through the night 5  3               Turn over in bed 4  3               Get out of bed 3  2               Make your bed 1  0               Put on socks (pantyhose) 0  3               Ride in a car 4  3               Sit in a chair for several hours 5  4               Stand up for 20-30 minutes 3  3               Climb one flight of stairs 1  1               Walk a few blocks (200-300 yards)  3  1               Walk several miles 5  2               Run one block (about 50 yards) 5  4               Take food out of the refrigerator 0  0               Reach up to high shelves 5  0               Move a chair 2  1               Pull or push heavy doors 5  1               Bend over to clean the bathtub 5  4               Throw a ball 5  3               Carry two bags of groceries 2  2               Lift and carry a heavy suitcase 3  2               Total score 66  42                  [employer] Gait & Balance Assessment Tool  BALANCE  Domain 04-27  2023 10-05  2023      Sitting Balance 1 1      Rises from chair    2 2      Attempts to rise    2 2      Immediate standing   balance (first 5 sec) 2    2      Standing balance    2 2      Nudge    2 2      Nudge, eyes closed 1 1      Turning 360 degrees (make a complete [place]) 0  1 1  1      Sitting down    2 2      BALANCE SCORE  15/16 16            GAIT  Domain 04-27  2023 10-05  2023      Indication of gait (Immediately after “'go”) 1 1      Step length and height 1  1  1  1 1  1  1  1      Step symmetry 1 1      Step continuity 1 1      Path 1 1      Trunk 2 2      Walking stance 1 1      GAIT  SCORE 12/12 12      BALANCE SCORE  15/16 16/16      TOTAL SCORE  27/28 28/28      Risk of Fall LOW  LOW          Review of Diagnostic Studies:  I have independently reviewed and interpreted the images with the patient and used the images and a tridimensional spine model to explain findings. I have also reviewed the reports.   MRI of the cervical spine without contrast on 3/21/2023 revealed diffuse cervical spondylosis.  Mild grade 1 anterolisthesis of C7 on T1.  Spinal cord caliber and signal appear preserved.  Axial imaging:  C2-C3: No significant canal or foraminal stenosis  C3-C4: Disc bulge, facet hypertrophy.  Mild canal stenosis.  Moderate left foraminal stenosis  C4-C5: Disc osteophyte complex, facet arthropathy.  Moderate canal and foraminal stenosis  C5-C6: Disc osteophyte complex, facet hypertrophy.  Moderate to severe spinal canal stenosis and bilateral neuroforaminal stenosis  C6-C7: Disc osteophyte complex, facet arthropathy.  Mild to moderate canal stenosis and foraminal stenosis  C7-T1: No significant canal or foraminal stenosis  Arterial duplex Doppler of the carotid arteries revealed normal flow without evidence of hemodynamically significant stenosis of the carotid arteries.  Vertebral arteries with antegrade flow.  Arterial duplex Doppler of the upper extremities on 3/22/2023 revealed normal arterial flow without evidence of stenosis in the upper extremities  MRI of the brain without and with contrast on 3/28/2023 revealed no contrast-enhancing abnormalities.  MRI of the internal auditory canal with and without contrast on 3/20/2023 revealed no acute findings in the internal auditory canal.  cervical x-rays including flexion and extension on 3/18/2023 revealed diffuse cervical spondylosis without evidence of instability with flexion or extension.  Uncovertebral joint hypertrophy and facet hypertrophy contributes to osseous neural foraminal stenosis.  Changes are more prominent at C5-C6 where there  is a disc osteophyte complex  Bilateral shoulders x-rays on 3/22/2023 revealed left greater than right severe glenohumeral joint degenerative changes.  MRI of the lumbar spine without contrast on 5/18/2021. There is some levoscoliosis.  Diffuse degenerative disc and facet joint disease.  There is anterolisthesis of L5 on S1.  The conus medullaris has an unremarkable appearance at T12-L1.  Axial imaging:  L1-L2: Disc desiccation, broad-based disc bulge.  Facet hypertrophy.  No significant canal stenosis.  Mild bilateral foraminal stenosis  L2-L3: Disc osteophyte complex contributing to mild bilateral neuroforaminal stenosis.  No significant canal stenosis.  Bilateral facet hypertrophy  L3-L4: Broad-based disc bulge with lateralization towards the left.  Facet hypertrophy more prominent on the left side.  Moderate left neuroforaminal stenosis.  L4-L5: Broad-based disc bulge, facet hypertrophy.  No significant canal stenosis.  Moderate to severe bilateral neuroforaminal stenosis  L5-S1: Broad-based disc bulge, facet hypertrophy.  Severe bilateral neuroforaminal stenosis   X-rays of the lumbar spine including flexion-extension films on 3/2/2021.  Diffuse lumbar spondylosis with severe facet hypertrophy.  Mild retrolisthesis of L1 on L2 and L2 on L3.  Grade 1 anterolisthesis of L5 on S1.  Alignment remains unchanged during flexion and extension.  Bilateral sacroiliac joint arthritis   EMG/NCV of the lower extremities 8/17/2018:   Chronic left L5 radiculopathy  Mild left peroneal neuropathy at the knee  Mild peripheral polyneuropathy     The following portions of the patient's history were reviewed and updated as appropriate: problem list, past medical history, past surgery history, social history, family history, medication reconciliation, and allergies    Review of Systems   HENT:  Positive for tinnitus.    Eyes:  Positive for photophobia, redness and visual disturbance.   Endocrine: Positive for cold intolerance.    Musculoskeletal:  Positive for arthralgias, back pain, myalgias, neck pain and neck stiffness.   Hematological:  Bruises/bleeds easily.   Psychiatric/Behavioral:  Positive for sleep disturbance.    All other systems reviewed and are negative.      Patient Active Problem List   Diagnosis    Primary osteoarthritis of right knee    S/P total knee arthroplasty, right    Tobacco abuse    Spondylolisthesis of lumbosacral region    Spondylosis of lumbar region without myelopathy or radiculopathy    Pars defect with spondylolisthesis    Myofascial pain    Cervical spondylosis without myelopathy    Abnormal stress test    Lumbar stenosis with neurogenic claudication    Left foot pain    Osteoarthritis of left shoulder    DDD (degenerative disc disease), cervical    Conn tiss and disc stenosis of intvrt foramin of cerv region    Hearing loss    History of balance disorder (patient relates to episodes of exacerbation of hearing loss)       Past Medical History:   Diagnosis Date    Adult BMI 28.0-28.9 kg/sq m     Back pain     Back problem     Cervical disc disorder April 2023    Cervical spondylosis without myelopathy     Chest pain     Chronic pain disorder     December 2022    Chronic pain of right knee     Headache, tension-type December 2022    HL (hearing loss)     Joint pain December 2023    Left groin hernia     Low back pain Several years    Lumbosacral disc disease Several years    Neck pain Several years    Palpitations     Pars defect with spondylolisthesis     PONV (postoperative nausea and vomiting)     Prediabetes     Primary osteoarthritis of right knee     SOB (shortness of breath)     Spinal stenosis March 2023    Spondylolisthesis of lumbosacral region     Spondylosis of lumbar region without myelopathy or radiculopathy     Staph infection 1974    right knee, treated with antibiotics     Tobacco abuse     Wears glasses          Past Surgical History:   Procedure Laterality Date    CARDIAC CATHETERIZATION  N/A 01/14/2021    Procedure: Left Heart Cath;  Surgeon: Nazario Salmon MD;  Location:  [facility];  Service: Cardiology;  Laterality: N/A;    COLONOSCOPY  10/2017    EPIDURAL BLOCK      JOINT REPLACEMENT  2020    Knee replacement    KNEE SURGERY Right 1974    debridement d/t staph infection    LASIK Bilateral     MEDIAL COLLATERAL LIGAMENT REPAIR, KNEE Right 1974    RHIZOTOMY      TOTAL KNEE ARTHROPLASTY Right 12/12/2017    Procedure: TOTAL KNEE ARTHROPLASTY RIGHT;  Surgeon: Nazario Leal MD;  Location:  [facility];  Service:          Family History   Problem Relation Age of Onset    Diabetes Father     Heart disease Father     Hypertension Father     Osteoarthritis Father     Stroke Father     Heart attack Father     Arthritis Father     Cancer Mother     Heart disease Mother          Social History     Socioeconomic History    Marital status:    Tobacco Use    Smoking status: Never    Smokeless tobacco: Current     Types: Chew    Tobacco comments:     Less than 1/2 can of smokeless tobacco per week   Vaping Use    Vaping Use: Never used   Substance and Sexual Activity    Alcohol use: Not Currently     Comment: NA    Drug use: No    Sexual activity: Defer           Current Outpatient Medications:     Ascorbic Acid (VITAMIN C PO), Take 100 mg by mouth Daily., Disp: , Rfl:     aspirin 81 MG EC tablet, Take 1 tablet by mouth Daily. Resume in 1 month (Patient taking differently: Take 1 tablet by mouth Daily.), Disp: , Rfl:     Dietary Management Product (Rheumate) capsule, Take one capsule by mouth once daily, Disp: 90 capsule, Rfl: 0    DULoxetine (CYMBALTA) 20 MG capsule, TAKE ONE CAPSULE BY MOUTH DAILY, Disp: 30 capsule, Rfl: 0    Gel Base gel, 2 g 4 (Four) Times a Day. prilocaine 2%, lidocaine 10%, imipramine 3%, capsaicin 0.001% and mannitol 20% (Patient taking differently: Use 2 g As Needed. prilocaine 2%, lidocaine 10%, imipramine 3%, capsaicin 0.001% and mannitol 20%), Disp: 240  "g, Rfl: 5    melatonin 5 MG tablet tablet, Take 1 tablet by mouth., Disp: , Rfl:     Multiple Vitamins-Minerals (ZINC PO), Take 1 tablet by mouth Daily., Disp: , Rfl:     pantoprazole (PROTONIX) 40 MG EC tablet, Take 1 tablet by mouth Daily., Disp: , Rfl:     rosuvastatin (CRESTOR) 10 MG tablet, Take 1 tablet by mouth Daily., Disp: 30 tablet, Rfl: 11      No Known Allergies      Pulse (!) 45   Temp 95.5 °F (35.3 °C)   Ht 185.4 cm (73\")   Wt 82.5 kg (181 lb 12.8 oz)   SpO2 100%   BMI 23.99 kg/m²       Physical Exam:  Constitutional: Patient appears well-developed, well-nourished, well-hydrated, appears younger than stated age  HEENT: Head: Normocephalic and atraumatic  Eyes: Conjunctivae and lids are normal  Pupils: Equal, round, reactive to light  Neck: Trachea normal. Neck supple. No JVD present.   Lymphatic: No cervical adenopathy  Peripheral vascular exam: Femoral: right 2+, left 2+. Dorsal pedis 2+, posterior tibial 2+. No edema  Musculoskeletal   Gait and station: Gait evaluation demonstrated a normal gait. Able to walk on heels, toes, tandem walking   Cervical Spine: Passive and active range of motion are improved with pain at the end of the ROM. Extension, lateral flexion, rotation of the cervical spine increased pain at the end of the ROM (still better than prior examination). Cervical facet joint loading maneuvers are positive.   TOS maneuvers: Negative at this time  Muscles: Presence of active trigger points at the bilateral levator scapulae, bilateral trapezius, bilateral rhomboids, bilateral  supraspinatus  Right Shoulder: The range of motion of the right glenohumeral joint is full and without pain. Rotator cuff strength is 5/5.   Left Shoulder: The range of motion of the left glenohumeral joint almost is full and without pain except with forward flexion above 130 degrees and abduction above 90 degrees. Rotator cuff strength is 5/5. Mild left subacromial Impingement. Tenderness upon palpation of the " teres minor tendon and muscle.   Thoracic Spine: Thoracic facet joint loading maneuvers are negative.   Lumbar Spine: Passive and active range of motion are limited secondary to pain. Extension, lateral flexion, and rotation of the lumbar spine increased pain. Lumbar facet joint loading maneuvers are positive.   Sacroiliac Joints: Puneet's test; Gaenslen's test; thigh thrust test; SI compression test; posterior shear test; SI distraction test; pelvic rock test; Yeoman's test: Negative   Piriformis maneuvers: Negative   Right Hip Joint: The range of motion of the hip joint is almost full and without pain   Left Hip Joint: The range of motion of the hip joint is almost full and without pain   Palpation of the bilateral ischial tuberosities: Unrevealing   Palpation of the bilateral psoas tendons and iliopsoas bursas: Unrevealing   Palpation of the bilateral greater trochanters: Unrevealing   Examination of the Iliotibial band: Unrevealing   Neurological:   Patient is alert and oriented to person, place, and time.   Speech: Normal.   Cortical function: Normal mental status.   Cranial nerves 2-12: intact.   Reflex Scores:  Right brachioradialis: 1+  Left brachioradialis: 1+  Right biceps: 1+  Left biceps: 1+  Right triceps: 1+  Left triceps: 1+  Right patellar: 0+  Left patellar: 0+  Right Achilles: 0+  Left Achilles: 0+  Motor strength: 5/5  Motor Tone: Normal  Involuntary movements: None.   Superficial/Primitive Reflexes: Primitive reflexes were absent.   Right Whitfield: Absent  Left Whitfield: Absent  Right ankle clonus: Absent  Left ankle clonus: Absent   Babinsky: Absent  Spurling sign: Negative. Neck tornado test: Negative. Lhermitte sign: Negative. Long tract signs: Negative. Straight leg raising test: Negative. Femoral stretch sign: Negative.   Sensory exam: Intact to light touch, intact pain and temperature sensation, intact vibration sensation and normal proprioception  Coordination: Finger to nose: Normal. Heel  to shin: Normal. Balance: Normal Romberg's sign: Negative   Skin and subcutaneous tissue: Skin is warm and intact. No rash noted. No cyanosis.   Psychiatric: Judgment and insight: Normal. Recent and remote memory: Intact. Mood and affect: Normal.     ASSESSMENT:   1. Cervical spondylosis without myelopathy    2. DDD (degenerative disc disease), cervical    3. Conn tiss and disc stenosis of intvrt foramin of cerv region    4. Osteoarthritis of left shoulder, unspecified osteoarthritis type    5. Spondylosis of lumbar region without myelopathy or radiculopathy    6. Spondylolisthesis of lumbosacral region    7. Pars defect with spondylolisthesis    8. Lumbar stenosis with neurogenic claudication    9. S/P total knee arthroplasty, right    10. Myofascial pain    11. Hearing loss, unspecified hearing loss type, unspecified laterality    12. History of balance disorder (patient relates to episodes of exacerbation of hearing loss)        PLAN/MEDICAL DECISION MAKING:  Mr. Yang Haji, 72 y.o. male presents with greater than 40-year history of progressive posterior cervical pain, which started after several sport-related injuries. On 05/22/2023, he underwent bilateral cervical medial branch rhizotomies from which he reports experiencing almost 100% pain relief and functional improvement lasting for more than four months, and for the past weeks more than 50% ongoing relief and functional improvement although there has been progressive recurrence. Patient used to experience bilateral cervicogenic and occipital headaches that have resolved since his cervical RFTC.  He denies radicular symptoms, cauda equina symptoms or any symptoms of myelopathy. MRI of the cervical spine without contrast on 03/21/2023 revealed diffuse cervical spondylosis. Spinal cord caliber and signal appear normal. Multilevel facet hypertrophy and disc osteophyte complexes, most significant at C5-C6 where there is moderate to severe spinal canal  stenosis and bilateral neuroforaminal stenosis. Cervical X-rays including flexion and extension on 03/18/2023 revealed diffuse cervical spondylosis without evidence of instability with flexion or extension. Facet hypertrophy contributes to osseous neural foraminal stenosis. Bilateral shoulders X-rays on 03/22/2023 revealed left greater than right severe glenohumeral joint degenerative changes. Arterial duplex Doppler of the carotid arteries, vertebral arteries and upper extremities revealed no evidence of hemodynamically significant stenosis. Yang Haji has been worked up by ENT. He developed difficulties with his balance and hearing loss. MRI of the brain without and with contrast on 3/28/2023 revealed no contrast-enhancing abnormalities. MRI of the internal auditory canal with and without contrast on 3/20/2023 revealed no acute findings in the internal auditory canal. Yang Haji also presents with a several year history of lower back pain. Yang Haji sustained numerous sport-related injuries throughout his life. Yang Haji failed trials with numerous medications including gabapentin, muscle relaxers, NSAIDs, to name a few. In the past, he experienced significant pain relief and functional improvement with lumbar medial branch rhizotomies and lumbar epidurals. Yang Haji underwent on 10/04/2021 diagnostic and therapeutic left L4-L5, left L5-S1, left S1 transforaminal epidural steroid injections with local anesthetic and steroids, from which he experienced significant pain relief and functional improvement that lasted for more than 6 months. In January of 2022, we discussed prospects of a spinal cord stimulator trial as he started experiencing recurrent symptoms. Fortunately, his lower back pain remained tolerable until recently. Yang Haji underwent neurosurgical consultation with Dr. Darshan Weber on 06/30/2021 and discussed L5-S1 decompression and fusion along with a  foraminotomy on the left at L4-L5.  Mr. Haji would like to avoid surgery. He does not have any radicular symptoms or neurogenic claudication at this time. He complains of axial mechanical lower back pain that has responded well to previous RFTC. Yang Haji has actively participated in conservative measures for the past decade as his daughter is a Doctor in physical therapy so that he has had extensive courses of PT. He failed trials with oral analgesics, opioids, topical analgesics, ice, heat, TENs, physical therapy, physical therapist directed home exercise program HEP (ongoing), therapeutic massage, independent exercise program (ongoing), to name a few. A comprehensive evaluation including history and physical exam along with pertinent physiologic and functional assessment was performed. Patient presents with intractable pain due to the diagnoses listed above. Patient failed to respond to conservative modalities, as referenced under HPI.  Patient has responded well to minimally invasive interventional pain management measures, as referenced from previous notes and under HPI. I have documented the impact of patient's moderate-to-severe pain contributing to significant impairment in daily activities, ADLs, and a negative impact on the patient's quality of life, as reflected on Global Pain Scale 22/100 (down from 67); Neck pain disability index questionnaire 20/50 (down from 26/50); Shoulder Pain and Disability Index (SPADI) 39/130 (down from 89); The [place] Back Pain Disability Scale 42/100 (down from 62/100); Tinetti Gait & Balance Assessment Tool  (28/28 low risk for falls). I have reviewed pertinent supporting diagnostic studies of patient's chronic pain condition as well as all available pertinent medical records to patient's chronic pain condition including previous therapies, as referenced above. PHQ-2 Depression Screening 0; Pain Self-Efficacy Questionnaire (PSEQ) 51/60. I had a lengthy conversation  with . Yang Haji regarding his chronic pain condition and potential therapeutic options including risks, benefits, alternative therapies, to name a few. We have discussed using a stepwise approach starting with the least intense level of care as determined by the extent required to diagnose and or treat a patient's condition. The proposed treatments are consistent with the patient's medical condition and known to be safe and effective by current guidelines and the standard of care. The duration and frequency proposed are considered appropriate for the service in accordance with accepted standards of medical practice for the diagnosis and treatment of the patient's condition and intended to improve the patient's level of function. These services will be furnished in a setting appropriate to the patient's medical needs and condition. Therefore, I have proposed the following plan:    1.  Interventional pain management measures: Patient does not take blood thinners.  A. Treatment of chronic mechanical cervicalgia: Patient presents with chronic unrelenting moderate to severe axial mechanical cervical spine facetogenic pain without evidence of underlying or untreated radiculopathy and that causes functional deficit measured on pain scales and or functional and disability scales. Pain has been present for >10 years. Patient failed to obtain pain relief or functional improvement from conservative measures, as referenced under HPI. Pain assessment has been performed and documented at baseline, reassessed after each diagnostic procedure using the same pain scale for each assessment. A disability scale was also obtained at baseline and used for functional assessment. Yang Haji  experienced almost 100% pain relief and functional improvement for more than four months, and for the past weeks more than 50% ongoing relief and functional improvement although there has been progressive recurrence. Patient used to  experience bilateral cervicogenic and occipital headaches that have resolved since his cervical RFTC. We have discussed repeating cervical RFTC if by the 6th month he is still at 50% relief. In that case, patient will be scheduled for repeat bilateral cervical medial branch rhizotomies at C4, C5, C6; for bilateral cervical facet joints at C4-C5, and C5-C6. Another option would be implantation of a peripheral nerve stimulator system Sprint Extensa to the cervical medial branches of C4-C5 bilaterally. Otherwise, we may consider cervical epidural steroid injection by interlaminar approach using the lowest effective dose of steroids, under C-arm fluoroscopic guidance, with the use of contrast dye (unless contraindicated) to confirm appropriate needle placement and spread of contrast dye. We may repeat cervical epidural steroid injection by interlaminar approach depending on patient's outcome and following current guidelines: epidurals will be limited to a maximum of 4 sessions per spinal region in a rolling twelve (12) month period. Continuation of epidural steroid injections over 12 months would only be considered under the following provisions;  · Patient is a high-risk surgical candidate, or the patient does not desire surgery, or recurrence of pain in the same location relieved with ESIs for at least three months and epidural provides at least 50% sustained improvement of pain and/or 50% objective improvement in function (using same scale as baseline)  · Pain is severe enough to cause a significant degree of functional disability or vocational disability  · The primary care provider will be notified regarding continuation of procedures and repeat steroid use   If he continues to struggle with pain, then, I will refer him to Dr. Darshan Weber for neurosurgical consultation.  B. Treatment of left shoulder pain/OA: None indicated at this time. If pain recurs and or function deteriorates, then, we may repeat therapeutic  left glenohumeral joint injection with local anesthetics and steroids. If patient fails to obtain sustained pain  relief, then, I will obtain an MRI of his shoulder and will refer him to orthopedics. Other options for treatment of his left shoulder pain/OA would include diagnostic left suprascapular nerve block and left axillary nerve block. If patient experiences more than 50% pain relief and improvement the range of motion of the shoulder joint, then, patient will be scheduled for a second set of diagnostic left suprascapular nerve block and left axillary nerve block, to then, proceed with bipolar radiofrequency ablation of the terminal sensory articular branches of the left suprascapular nerve and axillary nerve.   C. Treatment of chronic intractable lower back pain/spondylolisthesis: I have discussed with the patient at length prospects of implantation of a Sprint peripheral nerve stimulator system to the medial branches of L2 bilaterally. We have also discussed  Prospects of a spinal cord stimulator trial [employer], [employer] or [employer]. Patient has already received formal education from me including risks, benefits, and alternative treatments, as well as detailed information regarding the procedure and specific goals for the trial. Patient has also received didactic materials including educational booklets and DVDs on spinal cord stimulation therapies. I could also contemplate Vertiflex, MILD, [employer]eman    2. Diagnostic studies:  None indicated at this time. Prior to SCS, patient will need CBC, PT, PTT and MRI of the thoracic spine without contrast to assess capacity and patency of the spinal canal and epidural space prior to spinal cord stimulator trial and implant    3. Pharmacological measures: Reviewed and discussed;   A. Continue duloxetine 20 mg daily, Rheumate, (Analgesic gel: prilocaine 2%, lidocaine 10%, imipramine 3%, capsaicin 0.001%, mannitol 20% cream), melatonin 5 mg at bedtime  B.  Ondansetron 4 mg TID PRN nausea, #21    4. Long-term rehabilitation efforts:  A. The patient does not have a history of falls. Also, I performed a risk assessment for falls using the Tinetti gait & balance assessment tool (scored 28/28; low risk for falls).   B. Continue a comprehensive physical therapy program for Alter-G, gait and balance training, upper body strengthening, posture correction, core strengthening, gluteal and abductor strengthening, ultrasound, ASTYM, E-STIM, myofascial release, cupping, dry needling  C. Continue contrast therapy: Apply ice-packs for 15-20 minutes, followed by heating pads for 15-20 minutes to affected area   D. Continue low impact independent exercise program   E. I will consider prescribing a home traction unit depending on patient's response to treatment. I will discuss this with his daughter, who coordinates his physical therapy  F. Prior to consideration of a spinal cord stimulator trial, patient will need a referral to Dr. Haider Castrejon for psychological screening for spinal cord stimulation and intrathecal therapies.  PUMA Haji  reports that he has never smoked. His smokeless tobacco use includes chew. I have educated him on the risk of diseases from using tobacco products     5. Referrals: Dr Hernández in ENT for second opinion (hearing loss/balance disturbance)    6. The patient and his family have been instructed to contact my office with any questions or difficulties. The patient understands the plan and agrees to proceed accordingly.    The patient has a documented plan of care to address chronic pain. Yang Haji reports a pain score of 6/10.  Given his pain assessment as noted, treatment options were discussed and the following options were decided upon as a follow-up plan to address the patient's pain: continuation of current treatment plan for pain, educational materials on pain management, home exercises and therapy, prescription for non-opiod  analgesics, referral to Physical Therapy, referral to specialist for assistance in pain treatment guidance, steroid injections, use of non-medical modalities (ice, heat, stretching and/or behavior modifications), and interventional pain management measures including neuromodulation techniques .               Pain Management Panel           No data to display                 SALLY query complete. SALLY reviewed by Coleman Fung MD.     Pain Medications               aspirin 81 MG EC tablet Take 1 tablet by mouth Daily. Resume in 1 month    DULoxetine (CYMBALTA) 20 MG capsule TAKE ONE CAPSULE BY MOUTH DAILY             No orders of the defined types were placed in this encounter.       Time spent face-to-face with the patient: 51 minutes  Time spent reviewing diagnostic studies, consultation reports, previous treatments, ordering diagnostic studies, referrals, and writing this note: 14 minutes  Total Time: 65 minutes    Please note that portions of this note were completed with a voice recognition program.   Any copied data in any portion of my note has been reviewed by myself and accurate.     The 21st Century Cures Act makes medical notes like this available to patients in the interest of transparency. This is a medical document intended as peer to peer communication. It is written in medical language and may contain abbreviations or verbiage that are unfamiliar. It may appear blunt or direct. Medical documents are intended to carry relevant information, facts as evident, and the clinical opinion of the practitioner.     Coleman Fung MD    Patient Care Team:  Jamarcus Harmon MD as PCP - General (General Surgery)  Coleman Fung MD as Consulting Physician (Pain Medicine)  Nazario Leal MD as Consulting Physician (Orthopedic Surgery)  Juan Gonzalez MD (Inactive) as Consulting Physician (Neurosurgery)  Gal Bueno MD as Consulting Physician (Neurology)     No orders of the defined types were  placed in this encounter.        No future appointments.

## 2023-10-05 ENCOUNTER — OFFICE VISIT (OUTPATIENT)
Dept: PAIN MEDICINE | Facility: CLINIC | Age: 73
End: 2023-10-05
Payer: MEDICARE

## 2023-10-05 VITALS
WEIGHT: 181.8 LBS | BODY MASS INDEX: 24.09 KG/M2 | HEART RATE: 45 BPM | TEMPERATURE: 95.5 F | HEIGHT: 73 IN | OXYGEN SATURATION: 100 %

## 2023-10-05 DIAGNOSIS — M47.812 CERVICAL SPONDYLOSIS WITHOUT MYELOPATHY: ICD-10-CM

## 2023-10-05 DIAGNOSIS — M99.71 CONN TISS AND DISC STENOSIS OF INTVRT FORAMIN OF CERV REGION: ICD-10-CM

## 2023-10-05 DIAGNOSIS — M50.30 DDD (DEGENERATIVE DISC DISEASE), CERVICAL: ICD-10-CM

## 2023-10-05 DIAGNOSIS — R11.0 NAUSEA: Primary | ICD-10-CM

## 2023-10-05 DIAGNOSIS — M43.17 SPONDYLOLISTHESIS OF LUMBOSACRAL REGION: ICD-10-CM

## 2023-10-05 DIAGNOSIS — H91.90 HEARING LOSS, UNSPECIFIED HEARING LOSS TYPE, UNSPECIFIED LATERALITY: ICD-10-CM

## 2023-10-05 DIAGNOSIS — M79.18 MYOFASCIAL PAIN: ICD-10-CM

## 2023-10-05 DIAGNOSIS — M19.012 OSTEOARTHRITIS OF LEFT SHOULDER, UNSPECIFIED OSTEOARTHRITIS TYPE: ICD-10-CM

## 2023-10-05 DIAGNOSIS — M47.816 SPONDYLOSIS OF LUMBAR REGION WITHOUT MYELOPATHY OR RADICULOPATHY: ICD-10-CM

## 2023-10-05 DIAGNOSIS — Z87.898 HISTORY OF BALANCE DISORDER: ICD-10-CM

## 2023-10-05 DIAGNOSIS — Z96.651 S/P TOTAL KNEE ARTHROPLASTY, RIGHT: ICD-10-CM

## 2023-10-05 DIAGNOSIS — M43.10 PARS DEFECT WITH SPONDYLOLISTHESIS: ICD-10-CM

## 2023-10-05 DIAGNOSIS — M48.062 LUMBAR STENOSIS WITH NEUROGENIC CLAUDICATION: ICD-10-CM

## 2023-10-05 RX ORDER — ONDANSETRON 4 MG/1
TABLET, FILM COATED ORAL
Qty: 21 TABLET | Refills: 0 | Status: SHIPPED | OUTPATIENT
Start: 2023-10-05

## 2023-10-19 ENCOUNTER — TELEPHONE (OUTPATIENT)
Dept: PAIN MEDICINE | Facility: CLINIC | Age: 73
End: 2023-10-19
Payer: MEDICARE

## 2023-10-19 DIAGNOSIS — M47.812 CERVICAL SPONDYLOSIS WITHOUT MYELOPATHY: Primary | ICD-10-CM

## 2023-10-19 NOTE — TELEPHONE ENCOUNTER
Patient called and said he wants to have a repeat RFTC on his neck first. Then he said he wants to consider a Sprint PNS trial. Please advise next steps.

## 2023-11-20 ENCOUNTER — OUTSIDE FACILITY SERVICE (OUTPATIENT)
Dept: PAIN MEDICINE | Facility: CLINIC | Age: 73
End: 2023-11-20
Payer: MEDICARE

## 2024-04-02 ENCOUNTER — TELEPHONE (OUTPATIENT)
Dept: PAIN MEDICINE | Facility: CLINIC | Age: 74
End: 2024-04-02
Payer: MEDICARE

## 2024-04-02 NOTE — TELEPHONE ENCOUNTER
Caller: Evelia Haji    Relationship to patient: Emergency Contact    Best call back number: [phone]    Type of visit: TELEHEALTH     Requested date: ASAP      If rescheduling, when is the original appointment: 04/09/2024

## 2024-04-03 NOTE — TELEPHONE ENCOUNTER
Spoke with Evelia, and she needed to r/s the 4/9/24 telehealth appointment with Dr. Fung. R/s, no further needs expressed.

## 2024-04-23 ENCOUNTER — OFFICE (OUTPATIENT)
Dept: URBAN - METROPOLITAN AREA CLINIC 4 | Facility: CLINIC | Age: 74
End: 2024-04-23

## 2024-04-23 VITALS — SYSTOLIC BLOOD PRESSURE: 112 MMHG | HEIGHT: 73 IN | WEIGHT: 182 LBS | DIASTOLIC BLOOD PRESSURE: 68 MMHG

## 2024-04-23 DIAGNOSIS — R11.2 NAUSEA WITH VOMITING, UNSPECIFIED: ICD-10-CM

## 2024-04-23 DIAGNOSIS — K63.89 OTHER SPECIFIED DISEASES OF INTESTINE: ICD-10-CM

## 2024-04-23 DIAGNOSIS — R19.7 DIARRHEA, UNSPECIFIED: ICD-10-CM

## 2024-04-23 PROCEDURE — 99204 OFFICE O/P NEW MOD 45 MIN: CPT | Performed by: INTERNAL MEDICINE

## 2024-04-26 ENCOUNTER — TELEPHONE (OUTPATIENT)
Dept: PAIN MEDICINE | Facility: CLINIC | Age: 74
End: 2024-04-26
Payer: MEDICARE

## 2024-04-26 NOTE — TELEPHONE ENCOUNTER
"LVM WITH JONATHAN (PTS WIFE) ADVISING THAT DR. BRAXTON WILL OUT OF THE [place]TRY ON 4/30 AND WE NEED TO R/S OR IF PT IS DOING WELL THAT HE CAN SEND A MYCHART MESSAGE AS NEEDED.    Relay     \"DR BRAXTON IS OUT OF OFFICE ON 4/30 AND WE NEED TO RESCHEDULE THE APPOINTMENT OR IF YOU'RE DOING WELL YOU CAN EITHER CALL/SEND A MYCHART MESSAGE AS NEEDED.\"    HUB: IF PT WANTS TO R/S WE R/S TO NEXT AVAIL OF DR. BRAXTON OR TRANSFER TO OFFICE           "

## 2024-04-30 ENCOUNTER — TELEPHONE (OUTPATIENT)
Dept: PAIN MEDICINE | Facility: CLINIC | Age: 74
End: 2024-04-30
Payer: MEDICARE

## 2024-04-30 NOTE — TELEPHONE ENCOUNTER
Pts Wife Evelia left voicemail on this MA's line advising that the patient is doing well, but that he is requesting possibly another injection due to his neck is getting stiff again. Sending message to Dr. Fung to address upon return.

## 2024-05-14 NOTE — TELEPHONE ENCOUNTER
Spoke with pt and inquired about the amount of relief he received from his RFA. Pt reports he only had about 4 months worth of relief at approximately 40-45%. I advised that the next step Dr. Fung recommends is that he can get a cervical DONNY and/or a referral to Dr. Weber, and or he can also do a Sprint PNS 6 months after his RFA (which is May 27). Pt advised he isn't interested in a referral to Dr. Weber at this time, and he would like information about Sprint mailed to him. I put pamphlets in the mail to the pt for his review. He will call me and let me know. No further needs expressed.

## 2024-11-19 DIAGNOSIS — R94.39 ABNORMAL STRESS TEST: Primary | ICD-10-CM

## 2024-11-25 ENCOUNTER — HOSPITAL ENCOUNTER (OUTPATIENT)
Facility: HOSPITAL | Age: 74
Setting detail: HOSPITAL OUTPATIENT SURGERY
Discharge: HOME | End: 2024-11-25
Attending: INTERNAL MEDICINE | Admitting: INTERNAL MEDICINE
Payer: MEDICARE

## 2024-11-25 VITALS
BODY MASS INDEX: 23.75 KG/M2 | WEIGHT: 179.2 LBS | HEIGHT: 73 IN | HEART RATE: 45 BPM | OXYGEN SATURATION: 99 % | DIASTOLIC BLOOD PRESSURE: 69 MMHG | RESPIRATION RATE: 16 BRPM | SYSTOLIC BLOOD PRESSURE: 100 MMHG | TEMPERATURE: 97.8 F

## 2024-11-25 DIAGNOSIS — R94.39 ABNORMAL STRESS TEST: ICD-10-CM

## 2024-11-25 LAB
ANION GAP SERPL CALCULATED.3IONS-SCNC: 12 MMOL/L (ref 5–15)
BUN SERPL-MCNC: 16 MG/DL (ref 8–23)
BUN/CREAT SERPL: 17 (ref 7–25)
CALCIUM SPEC-SCNC: 9.5 MG/DL (ref 8.6–10.5)
CHLORIDE SERPL-SCNC: 103 MMOL/L (ref 98–107)
CHOLEST SERPL-MCNC: 114 MG/DL (ref 0–200)
CO2 SERPL-SCNC: 25 MMOL/L (ref 22–29)
CREAT SERPL-MCNC: 0.94 MG/DL (ref 0.76–1.27)
DEPRECATED RDW RBC AUTO: 40.8 FL (ref 37–54)
EGFRCR SERPLBLD CKD-EPI 2021: 86.1 ML/MIN/1.73
ERYTHROCYTE [DISTWIDTH] IN BLOOD BY AUTOMATED COUNT: 11.8 % (ref 12.3–15.4)
GLUCOSE SERPL-MCNC: 99 MG/DL (ref 65–99)
HCT VFR BLD AUTO: 40.6 % (ref 37.5–51)
HDLC SERPL-MCNC: 46 MG/DL (ref 40–60)
HGB BLD-MCNC: 14.1 G/DL (ref 13–17.7)
LDLC SERPL CALC-MCNC: 55 MG/DL (ref 0–100)
LDLC/HDLC SERPL: 1.23 {RATIO}
MCH RBC QN AUTO: 32.6 PG (ref 26.6–33)
MCHC RBC AUTO-ENTMCNC: 34.7 G/DL (ref 31.5–35.7)
MCV RBC AUTO: 94 FL (ref 79–97)
PLATELET # BLD AUTO: 241 10*3/MM3 (ref 140–450)
PMV BLD AUTO: 10 FL (ref 6–12)
POTASSIUM SERPL-SCNC: 4.9 MMOL/L (ref 3.5–5.2)
RBC # BLD AUTO: 4.32 10*6/MM3 (ref 4.14–5.8)
SODIUM SERPL-SCNC: 140 MMOL/L (ref 136–145)
TRIGL SERPL-MCNC: 57 MG/DL (ref 0–150)
VLDLC SERPL-MCNC: 13 MG/DL (ref 5–40)
WBC NRBC COR # BLD AUTO: 3.52 10*3/MM3 (ref 3.4–10.8)

## 2024-11-25 PROCEDURE — 25010000002 LIDOCAINE PF 1% 1 % SOLUTION: Performed by: INTERNAL MEDICINE

## 2024-11-25 PROCEDURE — 25010000002 NICARDIPINE 2.5 MG/ML SOLUTION: Performed by: INTERNAL MEDICINE

## 2024-11-25 PROCEDURE — 80048 BASIC METABOLIC PNL TOTAL CA: CPT | Performed by: PHYSICIAN ASSISTANT

## 2024-11-25 PROCEDURE — C1769 GUIDE WIRE: HCPCS | Performed by: INTERNAL MEDICINE

## 2024-11-25 PROCEDURE — 25010000002 PHENYLEPHRINE 10 MG/ML SOLUTION: Performed by: INTERNAL MEDICINE

## 2024-11-25 PROCEDURE — 93458 L HRT ARTERY/VENTRICLE ANGIO: CPT | Performed by: INTERNAL MEDICINE

## 2024-11-25 PROCEDURE — 25010000002 HEPARIN (PORCINE) PER 1000 UNITS: Performed by: INTERNAL MEDICINE

## 2024-11-25 PROCEDURE — 25810000003 SODIUM CHLORIDE 0.9 % SOLUTION: Performed by: INTERNAL MEDICINE

## 2024-11-25 PROCEDURE — 25010000002 FENTANYL CITRATE (PF) 50 MCG/ML SOLUTION: Performed by: INTERNAL MEDICINE

## 2024-11-25 PROCEDURE — 25010000002 MIDAZOLAM PER 1 MG: Performed by: INTERNAL MEDICINE

## 2024-11-25 PROCEDURE — C1894 INTRO/SHEATH, NON-LASER: HCPCS | Performed by: INTERNAL MEDICINE

## 2024-11-25 PROCEDURE — 80061 LIPID PANEL: CPT | Performed by: INTERNAL MEDICINE

## 2024-11-25 PROCEDURE — 36415 COLL VENOUS BLD VENIPUNCTURE: CPT

## 2024-11-25 PROCEDURE — 25510000001 IOPAMIDOL PER 1 ML: Performed by: INTERNAL MEDICINE

## 2024-11-25 PROCEDURE — 85027 COMPLETE CBC AUTOMATED: CPT | Performed by: PHYSICIAN ASSISTANT

## 2024-11-25 RX ORDER — ALPRAZOLAM 0.25 MG/1
0.25 TABLET ORAL 3 TIMES DAILY PRN
Status: DISCONTINUED | OUTPATIENT
Start: 2024-11-25 | End: 2024-11-25 | Stop reason: HOSPADM

## 2024-11-25 RX ORDER — FENTANYL CITRATE 50 UG/ML
INJECTION, SOLUTION INTRAMUSCULAR; INTRAVENOUS
Status: DISCONTINUED | OUTPATIENT
Start: 2024-11-25 | End: 2024-11-25 | Stop reason: HOSPADM

## 2024-11-25 RX ORDER — MIDAZOLAM HYDROCHLORIDE 1 MG/ML
INJECTION, SOLUTION INTRAMUSCULAR; INTRAVENOUS
Status: DISCONTINUED | OUTPATIENT
Start: 2024-11-25 | End: 2024-11-25 | Stop reason: HOSPADM

## 2024-11-25 RX ORDER — NALOXONE HCL 0.4 MG/ML
0.4 VIAL (ML) INJECTION
Status: DISCONTINUED | OUTPATIENT
Start: 2024-11-25 | End: 2024-11-25 | Stop reason: HOSPADM

## 2024-11-25 RX ORDER — IOPAMIDOL 755 MG/ML
INJECTION, SOLUTION INTRAVASCULAR
Status: DISCONTINUED | OUTPATIENT
Start: 2024-11-25 | End: 2024-11-25 | Stop reason: HOSPADM

## 2024-11-25 RX ORDER — LIDOCAINE HYDROCHLORIDE 10 MG/ML
INJECTION, SOLUTION EPIDURAL; INFILTRATION; INTRACAUDAL; PERINEURAL
Status: DISCONTINUED | OUTPATIENT
Start: 2024-11-25 | End: 2024-11-25 | Stop reason: HOSPADM

## 2024-11-25 RX ORDER — HEPARIN SODIUM 1000 [USP'U]/ML
INJECTION, SOLUTION INTRAVENOUS; SUBCUTANEOUS
Status: DISCONTINUED | OUTPATIENT
Start: 2024-11-25 | End: 2024-11-25 | Stop reason: HOSPADM

## 2024-11-25 RX ORDER — ASPIRIN 81 MG/1
81 TABLET ORAL DAILY
Status: DISCONTINUED | OUTPATIENT
Start: 2024-11-26 | End: 2024-11-25 | Stop reason: HOSPADM

## 2024-11-25 RX ORDER — SODIUM CHLORIDE 9 MG/ML
100 INJECTION, SOLUTION INTRAVENOUS CONTINUOUS
Status: DISCONTINUED | OUTPATIENT
Start: 2024-11-25 | End: 2024-11-25 | Stop reason: HOSPADM

## 2024-11-25 RX ORDER — ASPIRIN 81 MG/1
324 TABLET, CHEWABLE ORAL ONCE
Status: COMPLETED | OUTPATIENT
Start: 2024-11-25 | End: 2024-11-25

## 2024-11-25 RX ORDER — MORPHINE SULFATE 2 MG/ML
1 INJECTION, SOLUTION INTRAMUSCULAR; INTRAVENOUS EVERY 4 HOURS PRN
Status: DISCONTINUED | OUTPATIENT
Start: 2024-11-25 | End: 2024-11-25 | Stop reason: HOSPADM

## 2024-11-25 RX ORDER — HYDROCODONE BITARTRATE AND ACETAMINOPHEN 7.5; 325 MG/1; MG/1
1 TABLET ORAL EVERY 4 HOURS PRN
Status: DISCONTINUED | OUTPATIENT
Start: 2024-11-25 | End: 2024-11-25 | Stop reason: HOSPADM

## 2024-11-25 RX ORDER — ACETAMINOPHEN 325 MG/1
650 TABLET ORAL EVERY 4 HOURS PRN
Status: DISCONTINUED | OUTPATIENT
Start: 2024-11-25 | End: 2024-11-25 | Stop reason: HOSPADM

## 2024-11-25 RX ORDER — NITROGLYCERIN 0.4 MG/1
0.4 TABLET SUBLINGUAL
Status: DISCONTINUED | OUTPATIENT
Start: 2024-11-25 | End: 2024-11-25 | Stop reason: HOSPADM

## 2024-11-25 RX ORDER — PHENYLEPHRINE HYDROCHLORIDE 10 MG/ML
INJECTION INTRAVENOUS
Status: DISCONTINUED | OUTPATIENT
Start: 2024-11-25 | End: 2024-11-25 | Stop reason: HOSPADM

## 2024-11-25 RX ADMIN — ASPIRIN 81 MG 324 MG: 81 TABLET ORAL at 11:10

## 2024-11-25 NOTE — H&P
[facility] Cardiology, Preprocedure H&P  Date of Hospital Visit: 24  Encounter Provider: Maria Kerns MD    Place of Service: [facility]  Patient Name: Yang Haji  :1950  MRN: [id number]     Primary Care Provider: Jamarcus Harmon MD    Chief complaint: Abnormal stress test    PROBLEM LIST  Nonobstructive CAD  Abnormal stress test 2021  C 2021: 50% mid LAD disease in LAD that tapers prior to apex.  Medical management recommended  MPS 2024: Moderate area of reversible anterior and inferior ischemia.  Hyperlipidemia  Family history of CAD  Lumbar stenosis      History of Present Illness:  Patient is a 73-year-old history of nonobstructive CAD by heart catheterization in , hyperlipidemia and family history of CAD presents today for left heart catheterization.  Patient is a patient of Dr. Casillas.  He recently was sent for a stress test due to having chest pain.  That stress test showed moderate-sized area of anterior and inferior ischemia.  Patient at his last heart catheterization did have some mid LAD disease so patient returns today for heart catheterization for definitive evaluation.  Patient does state he is having intermittent left sided chest pain.  He notices it when he is up moving around as well as at rest.      I reviewed patient's past medical history, surgical history, family history and social history.    Current Outpatient Medications   Medication Instructions    Ascorbic Acid (VITAMIN C PO) 100 mg, Daily    DULoxetine (CYMBALTA) 20 MG capsule TAKE ONE CAPSULE BY MOUTH DAILY    melatonin 5 mg    Multiple Vitamins-Minerals (ZINC PO) 1 tablet, Daily    rosuvastatin (CRESTOR) 10 mg, Oral, Daily          Scheduled Meds:[START ON 2024] aspirin, 81 mg, Oral, Daily      Continuous Infusions:       Review of Systems            Objective:     Vitals:    24 1028 24 1030   BP: 104/69 119/70   BP Location: Right arm  "Left arm   Patient Position: Sitting Sitting   Pulse: 51 (!) 46   Resp: 14    Temp: 97.8 °F (36.6 °C)    TempSrc: Temporal    SpO2: 100% 100%   Weight: 81.3 kg (179 lb 3.2 oz)    Height: 185.4 cm (73\")        Body mass index is 23.64 kg/m².    No intake or output data in the 24 hours ending 11/25/24 1227    Constitutional:       Appearance: Healthy appearance. Not in distress.   Pulmonary:      Comments: Clear to auscultation bilaterally.  Cardiovascular:      Bradycardia present. Regular rhythm.      Murmurs: There is no murmur.      Comments: Palpable right radial and ulnar pulses  Pulses:     Intact distal pulses.   Edema:     Peripheral edema absent.   Neurological:      Mental Status: Alert and oriented to person, place and time.         Lab Review:                CBC:       Lab 11/25/24  1054   WBC 3.52   HEMOGLOBIN 14.1   HEMATOCRIT 40.6   PLATELETS 241   MCV 94.0     CMP:         Lab 11/25/24  1054   SODIUM 140   POTASSIUM 4.9   CHLORIDE 103   CO2 25.0   ANION GAP 12.0   BUN 16   CREATININE 0.94   EGFR 86.1   GLUCOSE 99   CALCIUM 9.5               Assessment:   Abnormal stress test with anterior and inferior ischemia  Nonobstructive CAD  [facility] 2021 with 50% mid LAD disease      Plan:   Proceed with left heart catheterization via the right radial approach with Dr. Kerns.  Risk and benefits discussed with the patient and he agrees to proceed.  Further recommendations to be made postprocedure by Dr. Kerns.      MD Marai Huitron MD, FACC    "

## 2025-04-16 ENCOUNTER — LAB (OUTPATIENT)
Facility: HOSPITAL | Age: 75
End: 2025-04-16
Payer: MEDICARE

## 2025-04-16 ENCOUNTER — TRANSCRIBE ORDERS (OUTPATIENT)
Facility: HOSPITAL | Age: 75
End: 2025-04-16
Payer: MEDICARE

## 2025-04-16 ENCOUNTER — HOSPITAL ENCOUNTER (OUTPATIENT)
Facility: HOSPITAL | Age: 75
Discharge: HOME | End: 2025-04-16
Payer: MEDICARE

## 2025-04-16 DIAGNOSIS — R73.09 IMPAIRED GLUCOSE TOLERANCE TEST: ICD-10-CM

## 2025-04-16 DIAGNOSIS — Z01.818 OTHER SPECIFIED PRE-OPERATIVE EXAMINATION: Primary | ICD-10-CM

## 2025-04-16 DIAGNOSIS — Z87.898 PERSONAL HISTORY OF UNSPECIFIED DISEASE: ICD-10-CM

## 2025-04-16 DIAGNOSIS — Z01.818 PRE-OP EVALUATION: ICD-10-CM

## 2025-04-16 DIAGNOSIS — Z01.818 PREOP EXAMINATION: Primary | ICD-10-CM

## 2025-04-16 DIAGNOSIS — Z01.818 OTHER SPECIFIED PRE-OPERATIVE EXAMINATION: ICD-10-CM

## 2025-04-16 DIAGNOSIS — Z01.818 PREOP EXAMINATION: ICD-10-CM

## 2025-04-16 DIAGNOSIS — Z01.818 PRE-OP EVALUATION: Primary | ICD-10-CM

## 2025-04-16 LAB
ANION GAP SERPL CALCULATED.3IONS-SCNC: 11.4 MMOL/L (ref 5–15)
BUN SERPL-MCNC: 17 MG/DL (ref 8–23)
BUN/CREAT SERPL: 15.5 (ref 7–25)
CALCIUM SPEC-SCNC: 9.5 MG/DL (ref 8.6–10.5)
CHLORIDE SERPL-SCNC: 103 MMOL/L (ref 98–107)
CO2 SERPL-SCNC: 27.6 MMOL/L (ref 22–29)
CREAT SERPL-MCNC: 1.1 MG/DL (ref 0.76–1.27)
DEPRECATED RDW RBC AUTO: 41.8 FL (ref 37–54)
EGFRCR SERPLBLD CKD-EPI 2021: 70.9 ML/MIN/1.73
ERYTHROCYTE [DISTWIDTH] IN BLOOD BY AUTOMATED COUNT: 11.9 % (ref 12.3–15.4)
GLUCOSE SERPL-MCNC: 82 MG/DL (ref 65–99)
HBA1C MFR BLD: 5.83 % (ref 4.8–5.6)
HCT VFR BLD AUTO: 38.8 % (ref 37.5–51)
HGB BLD-MCNC: 13.1 G/DL (ref 13–17.7)
MCH RBC QN AUTO: 31.8 PG (ref 26.6–33)
MCHC RBC AUTO-ENTMCNC: 33.8 G/DL (ref 31.5–35.7)
MCV RBC AUTO: 94.2 FL (ref 79–97)
PLATELET # BLD AUTO: 223 10*3/MM3 (ref 140–450)
PMV BLD AUTO: 11 FL (ref 6–12)
POTASSIUM SERPL-SCNC: 4.2 MMOL/L (ref 3.5–5.2)
RBC # BLD AUTO: 4.12 10*6/MM3 (ref 4.14–5.8)
SODIUM SERPL-SCNC: 142 MMOL/L (ref 136–145)
WBC NRBC COR # BLD AUTO: 3.54 10*3/MM3 (ref 3.4–10.8)

## 2025-04-16 PROCEDURE — 83036 HEMOGLOBIN GLYCOSYLATED A1C: CPT

## 2025-04-16 PROCEDURE — 80048 BASIC METABOLIC PNL TOTAL CA: CPT

## 2025-04-16 PROCEDURE — 36415 COLL VENOUS BLD VENIPUNCTURE: CPT

## 2025-04-16 PROCEDURE — 71046 X-RAY EXAM CHEST 2 VIEWS: CPT

## 2025-04-16 PROCEDURE — 85027 COMPLETE CBC AUTOMATED: CPT

## 2025-04-16 PROCEDURE — 93005 ELECTROCARDIOGRAM TRACING: CPT | Performed by: ORTHOPAEDIC SURGERY

## 2025-04-17 LAB
QT INTERVAL: 430 MS
QTC INTERVAL: 371 MS

## (undated) DEVICE — GW INQWIRE FC PTFE STD J/1.5 .035 260

## (undated) DEVICE — SIGMA LCS HIGH PERFORMANCE INSTRUMENTS STERILE THREADED PINS: Brand: SIGMA LCS HIGH PERFORMANCE

## (undated) DEVICE — INTRO SHEATH PRELUDE IDEAL SPRNG COIL .021 6F 16X80CM

## (undated) DEVICE — TB SXN FRAZIER 12F STRL

## (undated) DEVICE — NERVE BLOCK SUPPORT KIT/BLUE: Brand: MEDLINE INDUSTRIES, INC.

## (undated) DEVICE — ADULT, W/LG. BACK PAD, RADIOTRANSPARENT ELEMENT AND LEAD WIRE COMPATIBLE W/: Brand: DEFIBRILLATION ELECTRODES

## (undated) DEVICE — KT MANIFOLD CATHLAB CUST

## (undated) DEVICE — DEV COMP RAD PRELUDESYNC 24CM

## (undated) DEVICE — UNDERCAST PADDING: Brand: DEROYAL

## (undated) DEVICE — PK KN TOTL 10

## (undated) DEVICE — STRYKER PERFORMANCE SERIES SAGITTAL BLADE: Brand: STRYKER PERFORMANCE SERIES

## (undated) DEVICE — PK CATH CARD 10

## (undated) DEVICE — ANTIBACTERIAL UNDYED BRAIDED (POLYGLACTIN 910), SYNTHETIC ABSORBABLE SUTURE: Brand: COATED VICRYL

## (undated) DEVICE — Device

## (undated) DEVICE — INTRO SHEATH PRELUDE IDEAL SPRNG COIL 021 6F 23X80CM

## (undated) DEVICE — CATH DIAG EXPO .045 FL3  5F 100CM

## (undated) DEVICE — ADAPT ST INFUS ADMIN SYR 70IN

## (undated) DEVICE — SYS SKIN CLS DERMABOND PRINEO W/22CM MESH TP

## (undated) DEVICE — ST NERV BLCK CONT CONTIPLEX ECHO CLSD 18G 4IN

## (undated) DEVICE — BNDG ELAS W/CLIP 6IN 10YD LF STRL

## (undated) DEVICE — GLV SURG SENSICARE W/ALOE PF LF 9 STRL

## (undated) DEVICE — TR BAND RADIAL ARTERY COMPRESSION DEVICE: Brand: TR BAND

## (undated) DEVICE — NDL HYPO ECLPS SFTY 18G 1 1/2IN

## (undated) DEVICE — CATH DIAG EXPO M/ PK 5F FL4/FR4 PIG

## (undated) DEVICE — CANN NASL CO2 DIVIDED A/

## (undated) DEVICE — CVR HNDL LT SURG ACCSSRY BLU STRL

## (undated) DEVICE — SIGMA LCS HIGH PERFORMANCE STERILE THREADED HEADED PINS: Brand: SIGMA LCS HIGH PERFORMANCE

## (undated) DEVICE — NDL ANGIOGR ADV THN SMOTH SGLWALL 21G 1.5

## (undated) DEVICE — TRY EPID SFTY 18G 3.5IN 1T7680

## (undated) DEVICE — ENCORE® LATEX MICRO SIZE 8.5, STERILE LATEX POWDER-FREE SURGICAL GLOVE: Brand: ENCORE

## (undated) DEVICE — SKIN PREP TRAY W/CHG: Brand: MEDLINE INDUSTRIES, INC.

## (undated) DEVICE — KT PUMP PAIN ONQ CBLOC SELCTAFLO 400ML

## (undated) DEVICE — CATH DIAG EXPO M/ PK 6FR FL4/FR4 PIG 3PK

## (undated) DEVICE — LHK- LEFT HEART KIT BAPTIST: Brand: MEDLINE INDUSTRIES, INC.

## (undated) DEVICE — HIGH FLOW TIP

## (undated) DEVICE — INTENDED USE FOR SURGICAL MARKING ON INTACT SKIN, ALSO PROVIDES A PERMANENT METHOD OF IDENTIFYING OBJECTS IN THE OPERATING ROOM: Brand: WRITESITE® REGULAR TIP SKIN MARKER

## (undated) DEVICE — GW PERIPH GUIDERIGHT STD/EXCHNG/J/TIP SS 0.035IN 5X260CM

## (undated) DEVICE — DRSNG PAD ABD 8X10IN STRL

## (undated) DEVICE — CATH DIAG EXPO .056 FL3.5 6F 100CM

## (undated) DEVICE — SPNG GZ WOVN 4X4IN 12PLY 10/BX STRL

## (undated) DEVICE — RECIPROCATING BLADE, DOUBLE SIDED, OFFSET  (70.0 X 0.8 X 12.5MM)